# Patient Record
Sex: FEMALE | Race: WHITE | Employment: OTHER | URBAN - METROPOLITAN AREA
[De-identification: names, ages, dates, MRNs, and addresses within clinical notes are randomized per-mention and may not be internally consistent; named-entity substitution may affect disease eponyms.]

---

## 2017-01-12 ENCOUNTER — OFFICE VISIT (OUTPATIENT)
Dept: CARDIOLOGY CLINIC | Age: 77
End: 2017-01-12

## 2017-01-12 VITALS
HEIGHT: 67 IN | WEIGHT: 178 LBS | SYSTOLIC BLOOD PRESSURE: 130 MMHG | BODY MASS INDEX: 27.94 KG/M2 | HEART RATE: 57 BPM | DIASTOLIC BLOOD PRESSURE: 62 MMHG

## 2017-01-12 DIAGNOSIS — I25.10 CORONARY ARTERY DISEASE INVOLVING NATIVE CORONARY ARTERY OF NATIVE HEART WITHOUT ANGINA PECTORIS: ICD-10-CM

## 2017-01-12 DIAGNOSIS — I10 ESSENTIAL HYPERTENSION: Primary | ICD-10-CM

## 2017-01-12 DIAGNOSIS — Z72.0 TOBACCO ABUSE: ICD-10-CM

## 2017-01-12 DIAGNOSIS — E78.49 OTHER HYPERLIPIDEMIA: ICD-10-CM

## 2017-01-12 PROCEDURE — 4004F PT TOBACCO SCREEN RCVD TLK: CPT | Performed by: INTERNAL MEDICINE

## 2017-01-12 PROCEDURE — G8400 PT W/DXA NO RESULTS DOC: HCPCS | Performed by: INTERNAL MEDICINE

## 2017-01-12 PROCEDURE — 4040F PNEUMOC VAC/ADMIN/RCVD: CPT | Performed by: INTERNAL MEDICINE

## 2017-01-12 PROCEDURE — 93000 ELECTROCARDIOGRAM COMPLETE: CPT | Performed by: INTERNAL MEDICINE

## 2017-01-12 PROCEDURE — G8427 DOCREV CUR MEDS BY ELIG CLIN: HCPCS | Performed by: INTERNAL MEDICINE

## 2017-01-12 PROCEDURE — G8420 CALC BMI NORM PARAMETERS: HCPCS | Performed by: INTERNAL MEDICINE

## 2017-01-12 PROCEDURE — 99214 OFFICE O/P EST MOD 30 MIN: CPT | Performed by: INTERNAL MEDICINE

## 2017-01-12 PROCEDURE — G8484 FLU IMMUNIZE NO ADMIN: HCPCS | Performed by: INTERNAL MEDICINE

## 2017-01-12 PROCEDURE — 1090F PRES/ABSN URINE INCON ASSESS: CPT | Performed by: INTERNAL MEDICINE

## 2017-01-12 PROCEDURE — 1123F ACP DISCUSS/DSCN MKR DOCD: CPT | Performed by: INTERNAL MEDICINE

## 2017-01-12 PROCEDURE — G8598 ASA/ANTIPLAT THER USED: HCPCS | Performed by: INTERNAL MEDICINE

## 2017-01-12 RX ORDER — FENOFIBRATE 48 MG/1
48 TABLET, COATED ORAL DAILY
COMMUNITY
End: 2017-07-27

## 2017-06-29 RX ORDER — VALSARTAN 320 MG/1
TABLET ORAL
Qty: 90 TABLET | Refills: 1 | Status: SHIPPED | OUTPATIENT
Start: 2017-06-29 | End: 2018-07-06 | Stop reason: SDUPTHER

## 2017-07-27 ENCOUNTER — OFFICE VISIT (OUTPATIENT)
Dept: CARDIOLOGY CLINIC | Age: 77
End: 2017-07-27

## 2017-07-27 VITALS
HEART RATE: 62 BPM | SYSTOLIC BLOOD PRESSURE: 112 MMHG | HEIGHT: 67 IN | DIASTOLIC BLOOD PRESSURE: 54 MMHG | WEIGHT: 177 LBS | BODY MASS INDEX: 27.78 KG/M2

## 2017-07-27 DIAGNOSIS — I25.10 CORONARY ARTERY DISEASE INVOLVING NATIVE CORONARY ARTERY OF NATIVE HEART WITHOUT ANGINA PECTORIS: Primary | ICD-10-CM

## 2017-07-27 DIAGNOSIS — I10 ESSENTIAL HYPERTENSION: ICD-10-CM

## 2017-07-27 DIAGNOSIS — E78.49 OTHER HYPERLIPIDEMIA: ICD-10-CM

## 2017-07-27 PROCEDURE — 4040F PNEUMOC VAC/ADMIN/RCVD: CPT | Performed by: INTERNAL MEDICINE

## 2017-07-27 PROCEDURE — G8400 PT W/DXA NO RESULTS DOC: HCPCS | Performed by: INTERNAL MEDICINE

## 2017-07-27 PROCEDURE — 1090F PRES/ABSN URINE INCON ASSESS: CPT | Performed by: INTERNAL MEDICINE

## 2017-07-27 PROCEDURE — G8419 CALC BMI OUT NRM PARAM NOF/U: HCPCS | Performed by: INTERNAL MEDICINE

## 2017-07-27 PROCEDURE — 99214 OFFICE O/P EST MOD 30 MIN: CPT | Performed by: INTERNAL MEDICINE

## 2017-07-27 PROCEDURE — 4004F PT TOBACCO SCREEN RCVD TLK: CPT | Performed by: INTERNAL MEDICINE

## 2017-07-27 PROCEDURE — G8598 ASA/ANTIPLAT THER USED: HCPCS | Performed by: INTERNAL MEDICINE

## 2017-07-27 PROCEDURE — G8427 DOCREV CUR MEDS BY ELIG CLIN: HCPCS | Performed by: INTERNAL MEDICINE

## 2017-07-27 PROCEDURE — 1123F ACP DISCUSS/DSCN MKR DOCD: CPT | Performed by: INTERNAL MEDICINE

## 2017-07-27 RX ORDER — MELOXICAM 15 MG/1
15 TABLET ORAL DAILY
COMMUNITY
End: 2018-02-09

## 2017-07-28 ENCOUNTER — OFFICE VISIT (OUTPATIENT)
Dept: ENT CLINIC | Age: 77
End: 2017-07-28

## 2017-07-28 VITALS
DIASTOLIC BLOOD PRESSURE: 79 MMHG | HEART RATE: 60 BPM | SYSTOLIC BLOOD PRESSURE: 139 MMHG | BODY MASS INDEX: 27.78 KG/M2 | WEIGHT: 177 LBS | HEIGHT: 67 IN

## 2017-07-28 DIAGNOSIS — H92.11 OTORRHEA OF RIGHT EAR: Primary | ICD-10-CM

## 2017-07-28 PROCEDURE — G8419 CALC BMI OUT NRM PARAM NOF/U: HCPCS | Performed by: OTOLARYNGOLOGY

## 2017-07-28 PROCEDURE — 1123F ACP DISCUSS/DSCN MKR DOCD: CPT | Performed by: OTOLARYNGOLOGY

## 2017-07-28 PROCEDURE — 1090F PRES/ABSN URINE INCON ASSESS: CPT | Performed by: OTOLARYNGOLOGY

## 2017-07-28 PROCEDURE — 99203 OFFICE O/P NEW LOW 30 MIN: CPT | Performed by: OTOLARYNGOLOGY

## 2017-07-28 PROCEDURE — G8427 DOCREV CUR MEDS BY ELIG CLIN: HCPCS | Performed by: OTOLARYNGOLOGY

## 2017-07-28 PROCEDURE — 4040F PNEUMOC VAC/ADMIN/RCVD: CPT | Performed by: OTOLARYNGOLOGY

## 2017-07-28 PROCEDURE — 4004F PT TOBACCO SCREEN RCVD TLK: CPT | Performed by: OTOLARYNGOLOGY

## 2017-07-28 PROCEDURE — G8598 ASA/ANTIPLAT THER USED: HCPCS | Performed by: OTOLARYNGOLOGY

## 2017-07-28 PROCEDURE — G8400 PT W/DXA NO RESULTS DOC: HCPCS | Performed by: OTOLARYNGOLOGY

## 2017-07-28 RX ORDER — DOXYCYCLINE 100 MG/1
100 CAPSULE ORAL 2 TIMES DAILY
Qty: 20 CAPSULE | Refills: 0 | Status: SHIPPED | OUTPATIENT
Start: 2017-07-28 | End: 2018-01-10 | Stop reason: ALTCHOICE

## 2017-07-28 RX ORDER — HYDROCORTISONE AND ACETIC ACID 20.75; 10.375 MG/ML; MG/ML
4 SOLUTION AURICULAR (OTIC) 2 TIMES DAILY
Qty: 15 ML | Refills: 1 | Status: SHIPPED | OUTPATIENT
Start: 2017-07-28 | End: 2018-01-10 | Stop reason: ALTCHOICE

## 2017-07-28 ASSESSMENT — ENCOUNTER SYMPTOMS
ALLERGIC/IMMUNOLOGIC NEGATIVE: 1
RESPIRATORY NEGATIVE: 1
VOICE CHANGE: 0
TROUBLE SWALLOWING: 0
RHINORRHEA: 1
SORE THROAT: 0
EYES NEGATIVE: 1
SINUS PRESSURE: 1

## 2017-07-31 LAB
CULTURE EAR OR EYE: ABNORMAL
CULTURE EAR OR EYE: ABNORMAL
GRAM STAIN RESULT: ABNORMAL
ORGANISM: ABNORMAL

## 2017-08-07 ENCOUNTER — OFFICE VISIT (OUTPATIENT)
Dept: ENT CLINIC | Age: 77
End: 2017-08-07

## 2017-08-07 VITALS — HEIGHT: 67 IN | WEIGHT: 178 LBS | BODY MASS INDEX: 27.94 KG/M2

## 2017-08-07 DIAGNOSIS — H60.8X1 CHRONIC ECZEMATOUS OTITIS EXTERNA OF RIGHT EAR: Primary | ICD-10-CM

## 2017-08-07 PROCEDURE — G8427 DOCREV CUR MEDS BY ELIG CLIN: HCPCS | Performed by: OTOLARYNGOLOGY

## 2017-08-07 PROCEDURE — 1090F PRES/ABSN URINE INCON ASSESS: CPT | Performed by: OTOLARYNGOLOGY

## 2017-08-07 PROCEDURE — 99213 OFFICE O/P EST LOW 20 MIN: CPT | Performed by: OTOLARYNGOLOGY

## 2017-08-07 PROCEDURE — 1123F ACP DISCUSS/DSCN MKR DOCD: CPT | Performed by: OTOLARYNGOLOGY

## 2017-08-07 PROCEDURE — G8400 PT W/DXA NO RESULTS DOC: HCPCS | Performed by: OTOLARYNGOLOGY

## 2017-08-07 PROCEDURE — G8419 CALC BMI OUT NRM PARAM NOF/U: HCPCS | Performed by: OTOLARYNGOLOGY

## 2017-08-07 PROCEDURE — G8598 ASA/ANTIPLAT THER USED: HCPCS | Performed by: OTOLARYNGOLOGY

## 2017-08-07 PROCEDURE — 4004F PT TOBACCO SCREEN RCVD TLK: CPT | Performed by: OTOLARYNGOLOGY

## 2017-08-07 PROCEDURE — 4040F PNEUMOC VAC/ADMIN/RCVD: CPT | Performed by: OTOLARYNGOLOGY

## 2017-08-14 ENCOUNTER — TELEPHONE (OUTPATIENT)
Dept: ENT CLINIC | Age: 77
End: 2017-08-14

## 2017-11-13 ENCOUNTER — TELEPHONE (OUTPATIENT)
Dept: CARDIOLOGY CLINIC | Age: 77
End: 2017-11-13

## 2017-11-13 NOTE — TELEPHONE ENCOUNTER
Last OV 7/27/17:       Assessment   1. CAD (coronary artery disease): stable  GXT 12/14: normal perfusion  Angiogram 8/9/10> patent stents to LAD and Diag-1, normal EF  GXT Myoview 8/17/10> normal, homogenous uptake of radionuclide activity demonstrated in the left ventricular myocardium during both phased of the exam. Slight decreased activity in the apex on stress images. Angiogram 2/5/09> PTCA and stent of complex LAD and diagonal branch of LAD from 90% to less than 10%   2. HYPERTENSION: stable   3. Hyperlipidemia: stable, 2/22/17 ; TRIG 150; HDL 42; LDL 80   5. Tobacco abuse: patient states she quit May, 2012  6. Carotid stenosis  -stable  Doppler 5/31/16: 16-49% bilateral  Doppler 12/14: 16-49% bilateral           Plan:  Stable cardiac status. No med changes. Encouraged to get regular exercise. Return in 6 months.

## 2017-11-13 NOTE — TELEPHONE ENCOUNTER
CARDIAC CLEARANCE     What type of procedure are you having? colonoscopy    Which physician is performing your procedure? Dr. Lisa Cid    When is your procedure scheduled for? 1/2/17    Where are you having this procedure? [facility]    Are you taking Blood Thinners? yes   If so what? (name/dose/frequesncy)  aspirin 81 MG EC tablet and   valsartan (DIOVAN) 320 MG tablet and  clopidogrel (PLAVIX) 75 MG tablet     Does the surgeon want you to stop your blood thinner? If so for how long? What Dr decides    Phone Number and Contact Name for Physicians office:  Dr. Lisa Cid [phone]  Fax number to send information:     Please call to adv.   Thank you CSF

## 2018-01-10 VITALS — HEIGHT: 67 IN | BODY MASS INDEX: 27.31 KG/M2 | WEIGHT: 174 LBS

## 2018-01-10 RX ORDER — CETIRIZINE HYDROCHLORIDE 10 MG/1
10 TABLET ORAL DAILY
COMMUNITY
End: 2019-04-30

## 2018-01-25 ENCOUNTER — HOSPITAL ENCOUNTER (OUTPATIENT)
Dept: ENDOSCOPY | Age: 78
Discharge: HOME | End: 2018-01-25
Attending: INTERNAL MEDICINE | Admitting: INTERNAL MEDICINE

## 2018-01-25 LAB
GLUCOSE BLD-MCNC: 144 MG/DL (ref 70–99)
GLUCOSE BLD-MCNC: 153 MG/DL (ref 70–99)
PERFORMED ON: ABNORMAL
PERFORMED ON: ABNORMAL

## 2018-01-25 RX ORDER — SODIUM CHLORIDE 0.9 % (FLUSH) 0.9 %
10 SYRINGE (ML) INJECTION PRN
Status: DISCONTINUED | OUTPATIENT
Start: 2018-01-25 | End: 2018-01-26 | Stop reason: HOSPADM

## 2018-01-25 RX ORDER — SODIUM CHLORIDE 9 MG/ML
INJECTION, SOLUTION INTRAVENOUS CONTINUOUS
Status: DISCONTINUED | OUTPATIENT
Start: 2018-01-25 | End: 2018-01-26 | Stop reason: HOSPADM

## 2018-01-25 RX ORDER — SODIUM CHLORIDE 0.9 % (FLUSH) 0.9 %
10 SYRINGE (ML) INJECTION EVERY 12 HOURS SCHEDULED
Status: DISCONTINUED | OUTPATIENT
Start: 2018-01-25 | End: 2018-01-26 | Stop reason: HOSPADM

## 2018-01-25 NOTE — ANESTHESIA PRE-OP
Department of Anesthesiology  Preprocedure Note       Name:  Luisa Ramos   Age:  68 y.o.  :  1940                                          MRN:  [id number]         Date:  2018      Surgeon:    Procedure:    Medications prior to admission:   Prior to Admission medications    Medication Sig Start Date End Date Taking? Authorizing Provider   Sodium Chloride-Xylitol (XLEAR SINUS CARE SPRAY) SOLN by Nasal route   Yes Historical Provider, MD   cetirizine (ZYRTEC) 10 MG tablet Take 10 mg by mouth daily   Yes Historical Provider, MD   meloxicam (MOBIC) 15 MG tablet Take 15 mg by mouth daily    Historical Provider, MD   valsartan (DIOVAN) 320 MG tablet TAKE ONE TABLET BY MOUTH ONCE DAILY 17   Theoplis Reap, MD   traMADol (ULTRAM) 50 MG tablet Take 50 mg by mouth every 6 hours as needed for Pain    Historical Provider, MD   gabapentin (NEURONTIN) 300 MG capsule Take 1 capsule by mouth 2 times daily. 14   Theoplis Reap, MD   nebivolol (BYSTOLIC) 5 MG tablet Take by mouth daily 2.5 mg    Historical Provider, MD   dexlansoprazole (DEXILANT) 60 MG CPDR capsule Take 30 mg by mouth daily. Historical Provider, MD   alprazolam Damaris Hoops) 0.5 MG tablet Take 0.5 mg by mouth 3 times daily as needed. 8/19/10   Historical Provider, MD   metformin (GLUCOPHAGE) 1000 MG tablet Take 1,000 mg by mouth 2 times daily (with meals). Historical Provider, MD   venlafaxine (EFFEXOR XR) 150 MG XR capsule Take 150 mg by mouth daily. Historical Provider, MD   aspirin 81 MG EC tablet Take 81 mg by mouth daily. Historical Provider, MD   atorvastatin (LIPITOR) 80 MG tablet Take 80 mg by mouth nightly. 8/19/10   Historical Provider, MD   clopidogrel (PLAVIX) 75 MG tablet Take 75 mg by mouth daily. Historical Provider, MD   glipiZIDE (GLUCOTROL XL) 2.5 MG CR tablet Take 2.5 mg by mouth daily.     Historical Provider, MD       Current medications:    Current Outpatient Prescriptions   Medication Sig Dispense Refill  Sodium Chloride-Xylitol (XLEAR SINUS CARE SPRAY) SOLN by Nasal route      cetirizine (ZYRTEC) 10 MG tablet Take 10 mg by mouth daily      meloxicam (MOBIC) 15 MG tablet Take 15 mg by mouth daily      valsartan (DIOVAN) 320 MG tablet TAKE ONE TABLET BY MOUTH ONCE DAILY 90 tablet 1    traMADol (ULTRAM) 50 MG tablet Take 50 mg by mouth every 6 hours as needed for Pain      gabapentin (NEURONTIN) 300 MG capsule Take 1 capsule by mouth 2 times daily. 90 capsule 3    nebivolol (BYSTOLIC) 5 MG tablet Take by mouth daily 2.5 mg      dexlansoprazole (DEXILANT) 60 MG CPDR capsule Take 30 mg by mouth daily.  alprazolam (XANAX) 0.5 MG tablet Take 0.5 mg by mouth 3 times daily as needed.  metformin (GLUCOPHAGE) 1000 MG tablet Take 1,000 mg by mouth 2 times daily (with meals).  venlafaxine (EFFEXOR XR) 150 MG XR capsule Take 150 mg by mouth daily.  aspirin 81 MG EC tablet Take 81 mg by mouth daily.  atorvastatin (LIPITOR) 80 MG tablet Take 80 mg by mouth nightly.  clopidogrel (PLAVIX) 75 MG tablet Take 75 mg by mouth daily.  glipiZIDE (GLUCOTROL XL) 2.5 MG CR tablet Take 2.5 mg by mouth daily. Current Facility-Administered Medications   Medication Dose Route Frequency Provider Last Rate Last Dose    0.9 % sodium chloride infusion   Intravenous Continuous Rina Olmos MD        sodium chloride flush 0.9 % injection 10 mL  10 mL Intravenous 2 times per day Rina Olmos MD        sodium chloride flush 0.9 % injection 10 mL  10 mL Intravenous PRN Rina Olmos MD           Allergies:     Allergies   Allergen Reactions    Levofloxacin Hives    Actos [Pioglitazone Hydrochloride] Nausea Only    Bactrim Nausea Only    Cephalexin Itching    Lisinopril Nausea Only    Trazodone And Nefazodone Nausea Only    Vytorin [Ezetimibe-Simvastatin] Nausea Only       Problem List:    Patient Active Problem List   Diagnosis Code    CAD (coronary artery disease) I25.10   

## 2018-02-09 ENCOUNTER — OFFICE VISIT (OUTPATIENT)
Dept: CARDIOLOGY CLINIC | Age: 78
End: 2018-02-09

## 2018-02-09 VITALS
WEIGHT: 175 LBS | HEIGHT: 67 IN | BODY MASS INDEX: 27.47 KG/M2 | SYSTOLIC BLOOD PRESSURE: 120 MMHG | DIASTOLIC BLOOD PRESSURE: 64 MMHG | HEART RATE: 64 BPM

## 2018-02-09 DIAGNOSIS — E78.49 OTHER HYPERLIPIDEMIA: ICD-10-CM

## 2018-02-09 DIAGNOSIS — I73.9 PVD (PERIPHERAL VASCULAR DISEASE) (HCC): ICD-10-CM

## 2018-02-09 DIAGNOSIS — I25.10 CORONARY ARTERY DISEASE INVOLVING NATIVE CORONARY ARTERY OF NATIVE HEART WITHOUT ANGINA PECTORIS: Primary | ICD-10-CM

## 2018-02-09 DIAGNOSIS — I10 ESSENTIAL HYPERTENSION: ICD-10-CM

## 2018-02-09 PROCEDURE — 99214 OFFICE O/P EST MOD 30 MIN: CPT | Performed by: INTERNAL MEDICINE

## 2018-02-09 PROCEDURE — 4040F PNEUMOC VAC/ADMIN/RCVD: CPT | Performed by: INTERNAL MEDICINE

## 2018-02-09 PROCEDURE — 1090F PRES/ABSN URINE INCON ASSESS: CPT | Performed by: INTERNAL MEDICINE

## 2018-02-09 PROCEDURE — G8427 DOCREV CUR MEDS BY ELIG CLIN: HCPCS | Performed by: INTERNAL MEDICINE

## 2018-02-09 PROCEDURE — G8484 FLU IMMUNIZE NO ADMIN: HCPCS | Performed by: INTERNAL MEDICINE

## 2018-02-09 PROCEDURE — 4004F PT TOBACCO SCREEN RCVD TLK: CPT | Performed by: INTERNAL MEDICINE

## 2018-02-09 PROCEDURE — G8598 ASA/ANTIPLAT THER USED: HCPCS | Performed by: INTERNAL MEDICINE

## 2018-02-09 PROCEDURE — 1123F ACP DISCUSS/DSCN MKR DOCD: CPT | Performed by: INTERNAL MEDICINE

## 2018-02-09 PROCEDURE — G8419 CALC BMI OUT NRM PARAM NOF/U: HCPCS | Performed by: INTERNAL MEDICINE

## 2018-02-09 PROCEDURE — G8400 PT W/DXA NO RESULTS DOC: HCPCS | Performed by: INTERNAL MEDICINE

## 2018-02-09 RX ORDER — PANTOPRAZOLE SODIUM 40 MG/1
40 TABLET, DELAYED RELEASE ORAL DAILY
COMMUNITY
End: 2019-04-30

## 2018-02-09 NOTE — PROGRESS NOTES
Dispense Refill    cetirizine (ZYRTEC) 10 MG tablet Take 10 mg by mouth daily      valsartan (DIOVAN) 320 MG tablet TAKE ONE TABLET BY MOUTH ONCE DAILY 90 tablet 1    nebivolol (BYSTOLIC) 5 MG tablet Take 5 mg by mouth daily 2.5 mg      alprazolam (XANAX) 0.5 MG tablet Take 0.5 mg by mouth 3 times daily as needed.  metformin (GLUCOPHAGE) 1000 MG tablet Take 500 mg by mouth 2 times daily (with meals)       venlafaxine (EFFEXOR XR) 150 MG XR capsule Take 150 mg by mouth daily.  aspirin 81 MG EC tablet Take 81 mg by mouth daily.  atorvastatin (LIPITOR) 80 MG tablet Take 80 mg by mouth nightly.  clopidogrel (PLAVIX) 75 MG tablet Take 75 mg by mouth daily.  glipiZIDE (GLUCOTROL XL) 2.5 MG CR tablet Take 2.5 mg by mouth daily. No current facility-administered medications on file prior to visit. Review of Systems:   · Constitutional: there has been no unanticipated weight loss. · Eyes: No visual changes or diplopia. No scleral icterus. · ENT: No Headaches, hearing loss or vertigo. No mouth sores or sore throat. · Cardiovascular: Reviewed in HPI  · Respiratory: No cough or wheezing, no sputum production. No hematemesis. · Gastrointestinal: No abdominal pain, appetite loss, blood in stools. No change in bowel or bladder habits. · Genitourinary: No dysuria, trouble voiding, or hematuria. · Musculoskeletal:  No gait disturbance, weakness or joint complaints. · Integumentary: No rash or pruritis. · Neurological: No headache, diplopia, change in muscle strength, numbness or tingling. No change in gait, balance, coordination, mood, affect, memory, mentation, behavior. · Psychiatric: No anxiety, no depression. · Endocrine: No malaise, fatigue or temperature intolerance. No excessive thirst, fluid intake, or urination. No tremor. · Hematologic/Lymphatic: No abnormal bruising or bleeding, blood clots or swollen lymph nodes.   · Allergic/Immunologic: No nasal congestion or hives.    Physical Examination:    Vitals:    02/09/18 1517   BP: 120/64   Site: Left Arm   Position: Sitting   Cuff Size: Medium Adult   Pulse: 64   Weight: 175 lb (79.4 kg)   Height: 5' 7\" (1.702 m)     Body mass index is 27.41 kg/m². Wt Readings from Last 3 Encounters:   02/09/18 175 lb (79.4 kg)   01/10/18 174 lb (78.9 kg)   08/07/17 178 lb (80.7 kg)     BP Readings from Last 3 Encounters:   02/09/18 120/64   07/28/17 139/79   07/27/17 (!) 112/54       Constitutional and General Appearance: NAD, appears stated age  Respiratory:  · Normal excursion and expansion without use of accessory muscles  · Resp Auscultation: Normal breath sounds without dullness  Cardiovascular:  · The apical impulses not displaced  · Heart tones are crisp and normal  · Cervical veins are not engorged  · The carotid upstroke is normal in amplitude and contour without delay or bruit  · JVP is not elevated  · Peripheral pulses are symmetrical and full  · There is no clubbing, cyanosis of the extremities. · No edema  · Femoral Arteries: 2+ and equal  · Pedal Pulses: 2+ and equal   Abdomen:  · No masses or tenderness  · Liver/Spleen: No Abnormalities Noted  Neurological/Psychiatric:  · Alert and oriented in all spheres  · Moves all extremities well  · Exhibits normal gait balance and coordination  · No abnormalities of mood, affect, memory, mentation, or behavior are noted    Assessment:  1. CAD (coronary artery disease): Stable, no anginal symptoms. EKG 1/12/18> SB 57  GXT gretchen 12/14> normal perfusion  Angiogram 8/9/10> patent stents to LAD and Diag-1, normal EF  GXT Myoview 8/17/10> normal, homogenous uptake of radionuclide activity demonstrated in the left ventricular myocardium during both phased of the exam. Slight decreased activity in the apex on stress images. Angiogram 2/5/09> PTCA and stent of complex LAD and diagonal branch of LAD from 90% to less than 10%   2. Hypertension: Stable.  Blood pressure 120/64, pulse 64, height 5'

## 2018-05-08 ENCOUNTER — OFFICE VISIT (OUTPATIENT)
Dept: ENT CLINIC | Age: 78
End: 2018-05-08

## 2018-05-08 VITALS — SYSTOLIC BLOOD PRESSURE: 130 MMHG | DIASTOLIC BLOOD PRESSURE: 80 MMHG

## 2018-05-08 DIAGNOSIS — H92.11 OTORRHEA OF RIGHT EAR: Primary | ICD-10-CM

## 2018-05-08 DIAGNOSIS — H60.8X1 CHRONIC ECZEMATOUS OTITIS EXTERNA OF RIGHT EAR: ICD-10-CM

## 2018-05-08 PROCEDURE — G8427 DOCREV CUR MEDS BY ELIG CLIN: HCPCS | Performed by: OTOLARYNGOLOGY

## 2018-05-08 PROCEDURE — G8400 PT W/DXA NO RESULTS DOC: HCPCS | Performed by: OTOLARYNGOLOGY

## 2018-05-08 PROCEDURE — 4004F PT TOBACCO SCREEN RCVD TLK: CPT | Performed by: OTOLARYNGOLOGY

## 2018-05-08 PROCEDURE — G8598 ASA/ANTIPLAT THER USED: HCPCS | Performed by: OTOLARYNGOLOGY

## 2018-05-08 PROCEDURE — 1090F PRES/ABSN URINE INCON ASSESS: CPT | Performed by: OTOLARYNGOLOGY

## 2018-05-08 PROCEDURE — 99213 OFFICE O/P EST LOW 20 MIN: CPT | Performed by: OTOLARYNGOLOGY

## 2018-05-08 PROCEDURE — G8417 CALC BMI ABV UP PARAM F/U: HCPCS | Performed by: OTOLARYNGOLOGY

## 2018-05-08 PROCEDURE — 4040F PNEUMOC VAC/ADMIN/RCVD: CPT | Performed by: OTOLARYNGOLOGY

## 2018-05-08 PROCEDURE — 1123F ACP DISCUSS/DSCN MKR DOCD: CPT | Performed by: OTOLARYNGOLOGY

## 2018-05-08 RX ORDER — FERROUS SULFATE 325(65) MG
325 TABLET ORAL EVERY OTHER DAY
COMMUNITY
End: 2019-02-20 | Stop reason: ALTCHOICE

## 2018-05-08 RX ORDER — METHYLPREDNISOLONE 4 MG/1
4 TABLET ORAL SEE ADMIN INSTRUCTIONS
Qty: 1 KIT | Refills: 0 | Status: SHIPPED | OUTPATIENT
Start: 2018-05-08 | End: 2018-08-16

## 2018-05-14 ENCOUNTER — TELEPHONE (OUTPATIENT)
Dept: ENT CLINIC | Age: 78
End: 2018-05-14

## 2018-05-14 DIAGNOSIS — H60.8X1 CHRONIC ECZEMATOUS OTITIS EXTERNA OF RIGHT EAR: Primary | ICD-10-CM

## 2018-05-14 RX ORDER — HYDROCORTISONE AND ACETIC ACID 20.75; 10.375 MG/ML; MG/ML
3 SOLUTION AURICULAR (OTIC) 2 TIMES DAILY
Qty: 15 ML | Refills: 1 | Status: SHIPPED | OUTPATIENT
Start: 2018-05-14 | End: 2019-04-30

## 2018-05-18 ENCOUNTER — TELEPHONE (OUTPATIENT)
Dept: ENT CLINIC | Age: 78
End: 2018-05-18

## 2018-05-18 DIAGNOSIS — H92.11 OTORRHEA OF RIGHT EAR: Primary | ICD-10-CM

## 2018-05-21 RX ORDER — NEOMYCIN SULFATE, POLYMYXIN B SULFATE AND HYDROCORTISONE 10; 3.5; 1 MG/ML; MG/ML; [USP'U]/ML
3 SUSPENSION/ DROPS AURICULAR (OTIC) 3 TIMES DAILY
Qty: 10 ML | Refills: 1 | Status: SHIPPED | OUTPATIENT
Start: 2018-05-21 | End: 2018-05-31

## 2018-07-09 RX ORDER — VALSARTAN 320 MG/1
TABLET ORAL
Qty: 90 TABLET | Refills: 3 | Status: SHIPPED | OUTPATIENT
Start: 2018-07-09 | End: 2018-08-07 | Stop reason: ALTCHOICE

## 2018-08-07 ENCOUNTER — TELEPHONE (OUTPATIENT)
Dept: CARDIOLOGY CLINIC | Age: 78
End: 2018-08-07

## 2018-08-07 RX ORDER — IRBESARTAN 300 MG/1
300 TABLET ORAL NIGHTLY
Qty: 30 TABLET | Refills: 11 | Status: SHIPPED | OUTPATIENT
Start: 2018-08-07 | End: 2019-02-25

## 2018-08-07 NOTE — TELEPHONE ENCOUNTER
Pt calling got a letter about the recall on the Valsartan and needs to know what she can be changed to?  Pls call to advise Thank you

## 2018-08-16 ENCOUNTER — OFFICE VISIT (OUTPATIENT)
Dept: CARDIOLOGY CLINIC | Age: 78
End: 2018-08-16

## 2018-08-16 VITALS
HEIGHT: 67 IN | HEART RATE: 62 BPM | DIASTOLIC BLOOD PRESSURE: 60 MMHG | WEIGHT: 173 LBS | BODY MASS INDEX: 27.15 KG/M2 | SYSTOLIC BLOOD PRESSURE: 122 MMHG

## 2018-08-16 DIAGNOSIS — I10 ESSENTIAL HYPERTENSION: ICD-10-CM

## 2018-08-16 DIAGNOSIS — E78.49 OTHER HYPERLIPIDEMIA: ICD-10-CM

## 2018-08-16 DIAGNOSIS — E11.9 TYPE 2 DIABETES MELLITUS WITHOUT COMPLICATION, WITHOUT LONG-TERM CURRENT USE OF INSULIN (HCC): ICD-10-CM

## 2018-08-16 DIAGNOSIS — I25.10 CORONARY ARTERY DISEASE INVOLVING NATIVE CORONARY ARTERY OF NATIVE HEART WITHOUT ANGINA PECTORIS: Primary | ICD-10-CM

## 2018-08-16 PROCEDURE — 1123F ACP DISCUSS/DSCN MKR DOCD: CPT | Performed by: INTERNAL MEDICINE

## 2018-08-16 PROCEDURE — 4004F PT TOBACCO SCREEN RCVD TLK: CPT | Performed by: INTERNAL MEDICINE

## 2018-08-16 PROCEDURE — G8598 ASA/ANTIPLAT THER USED: HCPCS | Performed by: INTERNAL MEDICINE

## 2018-08-16 PROCEDURE — 99214 OFFICE O/P EST MOD 30 MIN: CPT | Performed by: INTERNAL MEDICINE

## 2018-08-16 PROCEDURE — G8400 PT W/DXA NO RESULTS DOC: HCPCS | Performed by: INTERNAL MEDICINE

## 2018-08-16 PROCEDURE — 4040F PNEUMOC VAC/ADMIN/RCVD: CPT | Performed by: INTERNAL MEDICINE

## 2018-08-16 PROCEDURE — 1090F PRES/ABSN URINE INCON ASSESS: CPT | Performed by: INTERNAL MEDICINE

## 2018-08-16 PROCEDURE — G8427 DOCREV CUR MEDS BY ELIG CLIN: HCPCS | Performed by: INTERNAL MEDICINE

## 2018-08-16 PROCEDURE — 1101F PT FALLS ASSESS-DOCD LE1/YR: CPT | Performed by: INTERNAL MEDICINE

## 2018-08-16 PROCEDURE — G8417 CALC BMI ABV UP PARAM F/U: HCPCS | Performed by: INTERNAL MEDICINE

## 2018-08-16 RX ORDER — CYCLOBENZAPRINE HCL 5 MG
5 TABLET ORAL 3 TIMES DAILY PRN
COMMUNITY
End: 2019-04-30

## 2018-08-16 RX ORDER — MIRTAZAPINE 30 MG/1
30 TABLET, FILM COATED ORAL NIGHTLY
COMMUNITY
End: 2019-04-30

## 2018-08-16 NOTE — PROGRESS NOTES
excessive thirst, fluid intake, or urination. No tremor. · Hematologic/Lymphatic: No abnormal bruising or bleeding, blood clots or swollen lymph nodes. · Allergic/Immunologic: No nasal congestion or hives. Physical Examination:    Vitals:    08/16/18 1418   BP: 122/60   Site: Left Arm   Position: Sitting   Cuff Size: Medium Adult   Pulse: 62   Weight: 173 lb (78.5 kg)   Height: 5' 7\" (1.702 m)     Body mass index is 27.1 kg/m². Wt Readings from Last 3 Encounters:   08/16/18 173 lb (78.5 kg)   02/09/18 175 lb (79.4 kg)   01/10/18 174 lb (78.9 kg)     BP Readings from Last 3 Encounters:   08/16/18 122/60   05/08/18 130/80   02/09/18 120/64       Constitutional and General Appearance: NAD, appears stated age  Respiratory:  · Normal excursion and expansion without use of accessory muscles  · Resp Auscultation: Normal breath sounds without dullness  Cardiovascular:  · The apical impulses not displaced  · Heart tones are crisp and normal  · Cervical veins are not engorged  · The carotid upstroke is normal in amplitude and contour without delay or bruit  · JVP is not elevated  · Peripheral pulses are symmetrical and full  · There is no clubbing, cyanosis of the extremities. · No edema  · Femoral Arteries: 2+ and equal  · Pedal Pulses: 2+ and equal   Abdomen:  · No masses or tenderness  · Liver/Spleen: No Abnormalities Noted  Neurological/Psychiatric:  · Alert and oriented in all spheres  · Moves all extremities well  · Exhibits normal gait balance and coordination  · No abnormalities of mood, affect, memory, mentation, or behavior are noted    Assessment:  1. CAD (coronary artery disease): Stable, no anginal symptoms.   GXT gretchen 12/14> normal perfusion  Angiogram 8/9/10> patent stents to LAD and Diag-1, normal EF  GXT Myoview 8/17/10> normal, homogenous uptake of radionuclide activity demonstrated in the left ventricular myocardium during both phased of the exam. Slight decreased activity in the apex on stress images. Angiogram 2/5/09> PTCA and stent of complex LAD and diagonal branch of LAD from 90% to less than 10%   2. Hypertension: Stable   3. Hyperlipidemia: Stable on Lipitor 80mg. 3/23/18> , TRIG 168, HDL 38, LDL 61   4. Carotid stenosis: Stable. Doppler 5/31/16> 16-49% bilateral  Doppler 12/14> 16-49% bilateral  5. Tobacco abuse: patient states she quit May, 2012  6. DM - A1c is >7 and TG are higher. She has not been compliant with her diet and is not sure how to count her carbs. Plan:  Stable cardiac status. No med changes. She is in need of diabetic education. Referral placed for diabetic teaching at [facility]. FU 6 months. Scribe's attestation: This note was scribed in the presence of Dr uJstyna Orozco MD by Jazmyne Willingham RN. The scribe's documentation has been prepared under my direction and personally reviewed by me in its entirety. I confirm that the note above accurately reflects all work, treatment, procedures, and medical decision making performed by me.

## 2018-08-17 NOTE — COMMUNICATION BODY
excessive thirst, fluid intake, or urination. No tremor. · Hematologic/Lymphatic: No abnormal bruising or bleeding, blood clots or swollen lymph nodes. · Allergic/Immunologic: No nasal congestion or hives. Physical Examination:    Vitals:    08/16/18 1418   BP: 122/60   Site: Left Arm   Position: Sitting   Cuff Size: Medium Adult   Pulse: 62   Weight: 173 lb (78.5 kg)   Height: 5' 7\" (1.702 m)     Body mass index is 27.1 kg/m². Wt Readings from Last 3 Encounters:   08/16/18 173 lb (78.5 kg)   02/09/18 175 lb (79.4 kg)   01/10/18 174 lb (78.9 kg)     BP Readings from Last 3 Encounters:   08/16/18 122/60   05/08/18 130/80   02/09/18 120/64       Constitutional and General Appearance: NAD, appears stated age  Respiratory:  · Normal excursion and expansion without use of accessory muscles  · Resp Auscultation: Normal breath sounds without dullness  Cardiovascular:  · The apical impulses not displaced  · Heart tones are crisp and normal  · Cervical veins are not engorged  · The carotid upstroke is normal in amplitude and contour without delay or bruit  · JVP is not elevated  · Peripheral pulses are symmetrical and full  · There is no clubbing, cyanosis of the extremities. · No edema  · Femoral Arteries: 2+ and equal  · Pedal Pulses: 2+ and equal   Abdomen:  · No masses or tenderness  · Liver/Spleen: No Abnormalities Noted  Neurological/Psychiatric:  · Alert and oriented in all spheres  · Moves all extremities well  · Exhibits normal gait balance and coordination  · No abnormalities of mood, affect, memory, mentation, or behavior are noted    Assessment:  1. CAD (coronary artery disease): Stable, no anginal symptoms.   GXT gretchen 12/14> normal perfusion  Angiogram 8/9/10> patent stents to LAD and Diag-1, normal EF  GXT Myoview 8/17/10> normal, homogenous uptake of radionuclide activity demonstrated in the left ventricular myocardium during both phased of the exam. Slight decreased activity in the apex on stress images. Angiogram 2/5/09> PTCA and stent of complex LAD and diagonal branch of LAD from 90% to less than 10%   2. Hypertension: Stable   3. Hyperlipidemia: Stable on Lipitor 80mg. 3/23/18> , TRIG 168, HDL 38, LDL 61   4. Carotid stenosis: Stable. Doppler 5/31/16> 16-49% bilateral  Doppler 12/14> 16-49% bilateral  5. Tobacco abuse: patient states she quit May, 2012  6. DM - A1c is >7 and TG are higher. She has not been compliant with her diet and is not sure how to count her carbs. Plan:  Stable cardiac status. No med changes. She is in need of diabetic education. Referral placed for diabetic teaching at [facility]. FU 6 months. Scribe's attestation: This note was scribed in the presence of Dr Chantell Link MD by Santos Lopez RN. The scribe's documentation has been prepared under my direction and personally reviewed by me in its entirety. I confirm that the note above accurately reflects all work, treatment, procedures, and medical decision making performed by me.

## 2018-08-27 ENCOUNTER — HOSPITAL ENCOUNTER (OUTPATIENT)
Dept: DIABETES SERVICES | Age: 78
Discharge: HOME | End: 2018-08-31

## 2018-08-27 DIAGNOSIS — E11.9 TYPE 2 DIABETES MELLITUS WITHOUT COMPLICATIONS (HCC): ICD-10-CM

## 2018-08-27 ASSESSMENT — SLEEP AND FATIGUE QUESTIONNAIRES
HOW DO YOU RATE THE QUALITY OF YOUR SLEEP: POOR
HAVE YOU BEEN TOLD, OR NOTICED ON YOUR OWN, THAT YOU STOP BREATHING OR STRUGGLE TO BREATHE IN YOUR SLEEP: NO
HAVE YOU EVER BEEN TESTED FOR SLEEP APNEA: NO
HOW MANY HOURS OF SLEEP ARE YOU GETTING, ON AVERAGE: 7 OR MORE

## 2018-08-27 ASSESSMENT — PROBLEM AREAS IN DIABETES QUESTIONNAIRE (PAID)
WORRYING ABOUT THE FUTURE AND THE POSSIBILITY OF SERIOUS COMPLICATIONS: 2
FEELING DEPRESSED WHEN YOU THINK ABOUT LIVING WITH DIABETES: 1
COPING WITH COMPLICATIONS OF DIABETES: 2
FEELING THAT DIABETES IS TAKING UP TOO MUCH OF YOUR MENTAL AND PHYSICAL ENERGY EVERY DAY: 2

## 2018-08-27 ASSESSMENT — PATIENT HEALTH QUESTIONNAIRE - PHQ9: SUM OF ALL RESPONSES TO PHQ QUESTIONS 1-9: 13

## 2018-08-27 NOTE — PROGRESS NOTES
Individual Comprehensive DSMT Assessment:  Health Literacy:   [x] adequate   [] limited  Pre-Test Score: 5/8  Sleep Screen: Negative for symptoms of Obstructive Sleep Apnea    PHQ9: PHQ-9 Total Score: 13 (8/27/2018  3:09 PM)        Initial instruction included:  [x] carb counting  [x] activity/exercise  [x] label reading  [x] monitoring   [] medications  [] hypoglycemia prevention/treatment  [] insulin management  [] other:    Education and Support Plan: Patient declined to schedule at this time, but stated intention to schedule in the near future.       Referring Provider: Rudy Keating MD

## 2018-09-01 ENCOUNTER — HOSPITAL ENCOUNTER (OUTPATIENT)
Dept: OTHER | Age: 78
Discharge: HOME | End: 2018-09-01
Attending: INTERNAL MEDICINE | Admitting: INTERNAL MEDICINE

## 2018-12-07 ENCOUNTER — OFFICE VISIT (OUTPATIENT)
Dept: ENT CLINIC | Age: 78
End: 2018-12-07
Payer: MEDICARE

## 2018-12-07 VITALS
DIASTOLIC BLOOD PRESSURE: 82 MMHG | SYSTOLIC BLOOD PRESSURE: 116 MMHG | WEIGHT: 173 LBS | BODY MASS INDEX: 27.1 KG/M2 | HEART RATE: 74 BPM

## 2018-12-07 DIAGNOSIS — H92.11 OTORRHEA OF RIGHT EAR: Primary | ICD-10-CM

## 2018-12-07 PROCEDURE — 1123F ACP DISCUSS/DSCN MKR DOCD: CPT | Performed by: OTOLARYNGOLOGY

## 2018-12-07 PROCEDURE — G8400 PT W/DXA NO RESULTS DOC: HCPCS | Performed by: OTOLARYNGOLOGY

## 2018-12-07 PROCEDURE — 99213 OFFICE O/P EST LOW 20 MIN: CPT | Performed by: OTOLARYNGOLOGY

## 2018-12-07 PROCEDURE — 1101F PT FALLS ASSESS-DOCD LE1/YR: CPT | Performed by: OTOLARYNGOLOGY

## 2018-12-07 PROCEDURE — 4040F PNEUMOC VAC/ADMIN/RCVD: CPT | Performed by: OTOLARYNGOLOGY

## 2018-12-07 PROCEDURE — 1090F PRES/ABSN URINE INCON ASSESS: CPT | Performed by: OTOLARYNGOLOGY

## 2018-12-07 PROCEDURE — G8598 ASA/ANTIPLAT THER USED: HCPCS | Performed by: OTOLARYNGOLOGY

## 2018-12-07 PROCEDURE — 4004F PT TOBACCO SCREEN RCVD TLK: CPT | Performed by: OTOLARYNGOLOGY

## 2018-12-07 PROCEDURE — G8427 DOCREV CUR MEDS BY ELIG CLIN: HCPCS | Performed by: OTOLARYNGOLOGY

## 2018-12-07 PROCEDURE — G8484 FLU IMMUNIZE NO ADMIN: HCPCS | Performed by: OTOLARYNGOLOGY

## 2018-12-07 PROCEDURE — G8417 CALC BMI ABV UP PARAM F/U: HCPCS | Performed by: OTOLARYNGOLOGY

## 2018-12-07 RX ORDER — DOXYCYCLINE 100 MG/1
100 CAPSULE ORAL 2 TIMES DAILY
Qty: 20 CAPSULE | Refills: 0 | Status: SHIPPED | OUTPATIENT
Start: 2018-12-07 | End: 2019-04-30

## 2018-12-07 RX ORDER — METHYLPREDNISOLONE 4 MG/1
4 TABLET ORAL SEE ADMIN INSTRUCTIONS
Qty: 1 KIT | Refills: 0 | Status: SHIPPED | OUTPATIENT
Start: 2018-12-07 | End: 2019-01-02 | Stop reason: SDUPTHER

## 2018-12-07 RX ORDER — GENTAMICIN SULFATE 3 MG/ML
SOLUTION/ DROPS OPHTHALMIC
Qty: 10 ML | Refills: 1 | Status: SHIPPED | OUTPATIENT
Start: 2018-12-07 | End: 2019-02-20

## 2018-12-10 ENCOUNTER — TELEPHONE (OUTPATIENT)
Dept: ENT CLINIC | Age: 78
End: 2018-12-10

## 2018-12-12 ENCOUNTER — TELEPHONE (OUTPATIENT)
Dept: ENT CLINIC | Age: 78
End: 2018-12-12

## 2018-12-12 LAB
GRAM STAIN RESULT: ABNORMAL
ORGANISM: ABNORMAL
ORGANISM: ABNORMAL
WOUND/ABSCESS: ABNORMAL

## 2018-12-12 NOTE — TELEPHONE ENCOUNTER
The patient was called and informed of the previous message she states understanding of this and will continue the antibiotic only.

## 2018-12-12 NOTE — TELEPHONE ENCOUNTER
Informed pt on detailed result message. Pt states that she  was advised to stop taking ATB Doxycyline by  due to medication raising patients blood sugar. Pt states she only took 2 pills and stopped med as advised. Now questions should she start back same ATB or any other advise/recoomendations? Please advise.

## 2018-12-17 ENCOUNTER — TELEPHONE (OUTPATIENT)
Dept: ENT CLINIC | Age: 78
End: 2018-12-17

## 2018-12-17 DIAGNOSIS — H92.11 OTORRHEA OF RIGHT EAR: ICD-10-CM

## 2019-01-02 RX ORDER — METHYLPREDNISOLONE 4 MG/1
4 TABLET ORAL SEE ADMIN INSTRUCTIONS
Qty: 1 KIT | Refills: 0 | Status: SHIPPED | OUTPATIENT
Start: 2019-01-02 | End: 2019-01-28 | Stop reason: ALTCHOICE

## 2019-01-04 ENCOUNTER — OFFICE VISIT (OUTPATIENT)
Dept: ENT CLINIC | Age: 79
End: 2019-01-04
Payer: MEDICARE

## 2019-01-04 VITALS — OXYGEN SATURATION: 96 % | SYSTOLIC BLOOD PRESSURE: 116 MMHG | DIASTOLIC BLOOD PRESSURE: 62 MMHG | HEART RATE: 63 BPM

## 2019-01-04 DIAGNOSIS — H92.11 OTORRHEA OF RIGHT EAR: ICD-10-CM

## 2019-01-04 DIAGNOSIS — H81.01 LABYRINTHINE VERTIGO WITH INVOLVEMENT OF RIGHT INNER EAR: Primary | ICD-10-CM

## 2019-01-04 PROCEDURE — 4004F PT TOBACCO SCREEN RCVD TLK: CPT | Performed by: OTOLARYNGOLOGY

## 2019-01-04 PROCEDURE — 1123F ACP DISCUSS/DSCN MKR DOCD: CPT | Performed by: OTOLARYNGOLOGY

## 2019-01-04 PROCEDURE — G8427 DOCREV CUR MEDS BY ELIG CLIN: HCPCS | Performed by: OTOLARYNGOLOGY

## 2019-01-04 PROCEDURE — 1090F PRES/ABSN URINE INCON ASSESS: CPT | Performed by: OTOLARYNGOLOGY

## 2019-01-04 PROCEDURE — 99213 OFFICE O/P EST LOW 20 MIN: CPT | Performed by: OTOLARYNGOLOGY

## 2019-01-04 PROCEDURE — 1101F PT FALLS ASSESS-DOCD LE1/YR: CPT | Performed by: OTOLARYNGOLOGY

## 2019-01-04 PROCEDURE — 4040F PNEUMOC VAC/ADMIN/RCVD: CPT | Performed by: OTOLARYNGOLOGY

## 2019-01-04 PROCEDURE — G8400 PT W/DXA NO RESULTS DOC: HCPCS | Performed by: OTOLARYNGOLOGY

## 2019-01-04 PROCEDURE — G8598 ASA/ANTIPLAT THER USED: HCPCS | Performed by: OTOLARYNGOLOGY

## 2019-01-04 PROCEDURE — G8484 FLU IMMUNIZE NO ADMIN: HCPCS | Performed by: OTOLARYNGOLOGY

## 2019-01-04 PROCEDURE — G8417 CALC BMI ABV UP PARAM F/U: HCPCS | Performed by: OTOLARYNGOLOGY

## 2019-01-04 RX ORDER — MECLIZINE HYDROCHLORIDE 25 MG/1
25 TABLET ORAL 2 TIMES DAILY
Qty: 30 TABLET | Refills: 1 | Status: SHIPPED | OUTPATIENT
Start: 2019-01-04 | End: 2019-04-30

## 2019-01-04 RX ORDER — ALPRAZOLAM 0.25 MG/1
0.25 TABLET ORAL 2 TIMES DAILY
Qty: 42 TABLET | Refills: 0 | Status: SHIPPED | OUTPATIENT
Start: 2019-01-04 | End: 2019-01-04

## 2019-01-08 ENCOUNTER — HOSPITAL ENCOUNTER (OUTPATIENT)
Dept: CT IMAGING | Age: 79
Discharge: HOME | End: 2019-01-08
Payer: MEDICARE

## 2019-01-08 DIAGNOSIS — H81.01 LABYRINTHINE VERTIGO WITH INVOLVEMENT OF RIGHT INNER EAR: ICD-10-CM

## 2019-01-08 DIAGNOSIS — H92.11 OTORRHEA OF RIGHT EAR: ICD-10-CM

## 2019-01-08 PROCEDURE — 70450 CT HEAD/BRAIN W/O DYE: CPT

## 2019-01-18 ENCOUNTER — TELEPHONE (OUTPATIENT)
Dept: ORTHOPEDIC SURGERY | Age: 79
End: 2019-01-18

## 2019-01-28 ENCOUNTER — OFFICE VISIT (OUTPATIENT)
Dept: ORTHOPEDIC SURGERY | Age: 79
End: 2019-01-28
Payer: MEDICARE

## 2019-01-28 VITALS
WEIGHT: 172 LBS | HEART RATE: 60 BPM | SYSTOLIC BLOOD PRESSURE: 139 MMHG | HEIGHT: 67 IN | DIASTOLIC BLOOD PRESSURE: 55 MMHG | BODY MASS INDEX: 27 KG/M2

## 2019-01-28 DIAGNOSIS — M54.16 LUMBAR RADICULOPATHY: ICD-10-CM

## 2019-01-28 DIAGNOSIS — M54.41 ACUTE BILATERAL LOW BACK PAIN WITH BILATERAL SCIATICA: ICD-10-CM

## 2019-01-28 DIAGNOSIS — M54.42 ACUTE BILATERAL LOW BACK PAIN WITH BILATERAL SCIATICA: ICD-10-CM

## 2019-01-28 DIAGNOSIS — M25.552 PAIN OF LEFT HIP JOINT: Primary | ICD-10-CM

## 2019-01-28 DIAGNOSIS — M16.12 PRIMARY OSTEOARTHRITIS OF LEFT HIP: ICD-10-CM

## 2019-01-28 PROCEDURE — G8598 ASA/ANTIPLAT THER USED: HCPCS | Performed by: ORTHOPAEDIC SURGERY

## 2019-01-28 PROCEDURE — G8484 FLU IMMUNIZE NO ADMIN: HCPCS | Performed by: ORTHOPAEDIC SURGERY

## 2019-01-28 PROCEDURE — 1090F PRES/ABSN URINE INCON ASSESS: CPT | Performed by: ORTHOPAEDIC SURGERY

## 2019-01-28 PROCEDURE — 99203 OFFICE O/P NEW LOW 30 MIN: CPT | Performed by: ORTHOPAEDIC SURGERY

## 2019-01-28 PROCEDURE — 1101F PT FALLS ASSESS-DOCD LE1/YR: CPT | Performed by: ORTHOPAEDIC SURGERY

## 2019-01-28 PROCEDURE — G8427 DOCREV CUR MEDS BY ELIG CLIN: HCPCS | Performed by: ORTHOPAEDIC SURGERY

## 2019-01-28 PROCEDURE — 4004F PT TOBACCO SCREEN RCVD TLK: CPT | Performed by: ORTHOPAEDIC SURGERY

## 2019-01-28 PROCEDURE — 1123F ACP DISCUSS/DSCN MKR DOCD: CPT | Performed by: ORTHOPAEDIC SURGERY

## 2019-01-28 PROCEDURE — G8417 CALC BMI ABV UP PARAM F/U: HCPCS | Performed by: ORTHOPAEDIC SURGERY

## 2019-01-28 PROCEDURE — 4040F PNEUMOC VAC/ADMIN/RCVD: CPT | Performed by: ORTHOPAEDIC SURGERY

## 2019-01-28 PROCEDURE — G8400 PT W/DXA NO RESULTS DOC: HCPCS | Performed by: ORTHOPAEDIC SURGERY

## 2019-02-12 ENCOUNTER — TELEPHONE (OUTPATIENT)
Dept: CARDIOLOGY CLINIC | Age: 79
End: 2019-02-12

## 2019-02-13 ENCOUNTER — OFFICE VISIT (OUTPATIENT)
Dept: ORTHOPEDIC SURGERY | Age: 79
End: 2019-02-13
Payer: MEDICARE

## 2019-02-13 VITALS
BODY MASS INDEX: 27.31 KG/M2 | WEIGHT: 174 LBS | HEIGHT: 67 IN | SYSTOLIC BLOOD PRESSURE: 148 MMHG | HEART RATE: 62 BPM | DIASTOLIC BLOOD PRESSURE: 68 MMHG

## 2019-02-13 DIAGNOSIS — M48.061 SPINAL STENOSIS OF LUMBAR REGION WITHOUT NEUROGENIC CLAUDICATION: ICD-10-CM

## 2019-02-13 DIAGNOSIS — M47.816 SPONDYLOSIS OF LUMBAR REGION WITHOUT MYELOPATHY OR RADICULOPATHY: ICD-10-CM

## 2019-02-13 DIAGNOSIS — M54.16 LUMBAR RADICULOPATHY: ICD-10-CM

## 2019-02-13 DIAGNOSIS — M48.061 LUMBAR FORAMINAL STENOSIS: Primary | ICD-10-CM

## 2019-02-13 PROCEDURE — 1090F PRES/ABSN URINE INCON ASSESS: CPT | Performed by: PHYSICAL MEDICINE & REHABILITATION

## 2019-02-13 PROCEDURE — 1101F PT FALLS ASSESS-DOCD LE1/YR: CPT | Performed by: PHYSICAL MEDICINE & REHABILITATION

## 2019-02-13 PROCEDURE — 99204 OFFICE O/P NEW MOD 45 MIN: CPT | Performed by: PHYSICAL MEDICINE & REHABILITATION

## 2019-02-13 PROCEDURE — G8417 CALC BMI ABV UP PARAM F/U: HCPCS | Performed by: PHYSICAL MEDICINE & REHABILITATION

## 2019-02-13 PROCEDURE — G8484 FLU IMMUNIZE NO ADMIN: HCPCS | Performed by: PHYSICAL MEDICINE & REHABILITATION

## 2019-02-13 PROCEDURE — G8427 DOCREV CUR MEDS BY ELIG CLIN: HCPCS | Performed by: PHYSICAL MEDICINE & REHABILITATION

## 2019-02-14 ENCOUNTER — TELEPHONE (OUTPATIENT)
Dept: ORTHOPEDIC SURGERY | Age: 79
End: 2019-02-14

## 2019-02-19 ENCOUNTER — TELEPHONE (OUTPATIENT)
Dept: CARDIOLOGY CLINIC | Age: 79
End: 2019-02-19

## 2019-02-25 ENCOUNTER — OFFICE VISIT (OUTPATIENT)
Dept: CARDIOLOGY CLINIC | Age: 79
End: 2019-02-25
Payer: MEDICARE

## 2019-02-25 VITALS
HEIGHT: 67 IN | HEART RATE: 61 BPM | DIASTOLIC BLOOD PRESSURE: 60 MMHG | WEIGHT: 173 LBS | BODY MASS INDEX: 27.15 KG/M2 | SYSTOLIC BLOOD PRESSURE: 110 MMHG

## 2019-02-25 DIAGNOSIS — I25.10 CORONARY ARTERY DISEASE INVOLVING NATIVE CORONARY ARTERY OF NATIVE HEART WITHOUT ANGINA PECTORIS: ICD-10-CM

## 2019-02-25 DIAGNOSIS — I10 ESSENTIAL HYPERTENSION: Primary | ICD-10-CM

## 2019-02-25 DIAGNOSIS — E78.49 OTHER HYPERLIPIDEMIA: ICD-10-CM

## 2019-02-25 PROCEDURE — 93000 ELECTROCARDIOGRAM COMPLETE: CPT | Performed by: INTERNAL MEDICINE

## 2019-02-25 PROCEDURE — G8428 CUR MEDS NOT DOCUMENT: HCPCS | Performed by: INTERNAL MEDICINE

## 2019-02-25 PROCEDURE — G8417 CALC BMI ABV UP PARAM F/U: HCPCS | Performed by: INTERNAL MEDICINE

## 2019-02-25 PROCEDURE — G8598 ASA/ANTIPLAT THER USED: HCPCS | Performed by: INTERNAL MEDICINE

## 2019-02-25 PROCEDURE — G8484 FLU IMMUNIZE NO ADMIN: HCPCS | Performed by: INTERNAL MEDICINE

## 2019-02-25 PROCEDURE — 1090F PRES/ABSN URINE INCON ASSESS: CPT | Performed by: INTERNAL MEDICINE

## 2019-02-25 PROCEDURE — 99214 OFFICE O/P EST MOD 30 MIN: CPT | Performed by: INTERNAL MEDICINE

## 2019-02-25 PROCEDURE — 4040F PNEUMOC VAC/ADMIN/RCVD: CPT | Performed by: INTERNAL MEDICINE

## 2019-02-25 PROCEDURE — G8400 PT W/DXA NO RESULTS DOC: HCPCS | Performed by: INTERNAL MEDICINE

## 2019-02-25 PROCEDURE — 1101F PT FALLS ASSESS-DOCD LE1/YR: CPT | Performed by: INTERNAL MEDICINE

## 2019-02-25 PROCEDURE — 4004F PT TOBACCO SCREEN RCVD TLK: CPT | Performed by: INTERNAL MEDICINE

## 2019-02-25 PROCEDURE — 1123F ACP DISCUSS/DSCN MKR DOCD: CPT | Performed by: INTERNAL MEDICINE

## 2019-02-25 RX ORDER — FERROUS SULFATE 325(65) MG
325 TABLET ORAL
COMMUNITY
End: 2019-07-23

## 2019-02-25 RX ORDER — MELOXICAM 15 MG/1
15 TABLET ORAL DAILY
COMMUNITY
End: 2019-04-30

## 2019-02-25 RX ORDER — VALSARTAN 320 MG/1
320 TABLET ORAL DAILY
COMMUNITY
End: 2019-04-15

## 2019-02-26 ENCOUNTER — TELEPHONE (OUTPATIENT)
Dept: ORTHOPEDIC SURGERY | Age: 79
End: 2019-02-26

## 2019-02-27 ENCOUNTER — APPOINTMENT (OUTPATIENT)
Dept: GENERAL RADIOLOGY | Age: 79
End: 2019-02-27
Attending: PHYSICAL MEDICINE & REHABILITATION
Payer: MEDICARE

## 2019-02-27 ENCOUNTER — HOSPITAL ENCOUNTER (OUTPATIENT)
Age: 79
Setting detail: OUTPATIENT SURGERY
Discharge: HOME | End: 2019-02-27
Attending: PHYSICAL MEDICINE & REHABILITATION | Admitting: PHYSICAL MEDICINE & REHABILITATION
Payer: MEDICARE

## 2019-02-27 VITALS
TEMPERATURE: 97.4 F | RESPIRATION RATE: 16 BRPM | HEART RATE: 58 BPM | HEIGHT: 67 IN | WEIGHT: 173 LBS | DIASTOLIC BLOOD PRESSURE: 61 MMHG | BODY MASS INDEX: 27.15 KG/M2 | SYSTOLIC BLOOD PRESSURE: 131 MMHG | OXYGEN SATURATION: 100 %

## 2019-02-27 LAB
GLUCOSE BLD-MCNC: 142 MG/DL (ref 70–99)
PERFORMED ON: ABNORMAL

## 2019-02-27 PROCEDURE — 3610000056 HC PAIN LEVEL 4 BASE (NON-OR): Performed by: PHYSICAL MEDICINE & REHABILITATION

## 2019-02-27 PROCEDURE — 99152 MOD SED SAME PHYS/QHP 5/>YRS: CPT | Performed by: PHYSICAL MEDICINE & REHABILITATION

## 2019-02-27 PROCEDURE — 2709999900 HC NON-CHARGEABLE SUPPLY: Performed by: PHYSICAL MEDICINE & REHABILITATION

## 2019-02-27 PROCEDURE — 2500000003 HC RX 250 WO HCPCS: Performed by: PHYSICAL MEDICINE & REHABILITATION

## 2019-02-27 PROCEDURE — 3209999900 FLUORO FOR SURGICAL PROCEDURES

## 2019-02-27 PROCEDURE — 6360000002 HC RX W HCPCS: Performed by: PHYSICAL MEDICINE & REHABILITATION

## 2019-02-27 RX ORDER — MIDAZOLAM HYDROCHLORIDE 1 MG/ML
INJECTION INTRAMUSCULAR; INTRAVENOUS
Status: COMPLETED | OUTPATIENT
Start: 2019-02-27 | End: 2019-02-27

## 2019-02-27 RX ORDER — METHYLPREDNISOLONE ACETATE 80 MG/ML
INJECTION, SUSPENSION INTRA-ARTICULAR; INTRALESIONAL; INTRAMUSCULAR; SOFT TISSUE
Status: COMPLETED | OUTPATIENT
Start: 2019-02-27 | End: 2019-02-27

## 2019-02-27 RX ORDER — BUPIVACAINE HYDROCHLORIDE 5 MG/ML
INJECTION, SOLUTION PERINEURAL
Status: COMPLETED | OUTPATIENT
Start: 2019-02-27 | End: 2019-02-27

## 2019-02-27 RX ORDER — LIDOCAINE HYDROCHLORIDE 10 MG/ML
INJECTION, SOLUTION INFILTRATION; PERINEURAL
Status: COMPLETED | OUTPATIENT
Start: 2019-02-27 | End: 2019-02-27

## 2019-02-27 ASSESSMENT — PAIN DESCRIPTION - LOCATION
LOCATION: BACK
LOCATION: BACK

## 2019-02-27 ASSESSMENT — PAIN DESCRIPTION - ORIENTATION
ORIENTATION: LOWER
ORIENTATION: LOWER

## 2019-02-27 ASSESSMENT — PAIN SCALES - GENERAL
PAINLEVEL_OUTOF10: 3
PAINLEVEL_OUTOF10: 3

## 2019-02-27 ASSESSMENT — PAIN DESCRIPTION - DESCRIPTORS: DESCRIPTORS: ACHING

## 2019-02-27 ASSESSMENT — PAIN - FUNCTIONAL ASSESSMENT: PAIN_FUNCTIONAL_ASSESSMENT: 0-10

## 2019-03-11 ENCOUNTER — OFFICE VISIT (OUTPATIENT)
Dept: ORTHOPEDIC SURGERY | Age: 79
End: 2019-03-11
Payer: MEDICARE

## 2019-03-11 VITALS
HEART RATE: 67 BPM | HEIGHT: 67 IN | SYSTOLIC BLOOD PRESSURE: 122 MMHG | DIASTOLIC BLOOD PRESSURE: 72 MMHG | BODY MASS INDEX: 27.16 KG/M2 | WEIGHT: 173.06 LBS

## 2019-03-11 DIAGNOSIS — M43.16 SPONDYLOLISTHESIS OF LUMBAR REGION: ICD-10-CM

## 2019-03-11 DIAGNOSIS — M51.36 DDD (DEGENERATIVE DISC DISEASE), LUMBAR: Primary | ICD-10-CM

## 2019-03-11 DIAGNOSIS — M47.816 SPONDYLOSIS OF LUMBAR REGION WITHOUT MYELOPATHY OR RADICULOPATHY: ICD-10-CM

## 2019-03-11 PROCEDURE — G8400 PT W/DXA NO RESULTS DOC: HCPCS | Performed by: PHYSICAL MEDICINE & REHABILITATION

## 2019-03-11 PROCEDURE — 1090F PRES/ABSN URINE INCON ASSESS: CPT | Performed by: PHYSICAL MEDICINE & REHABILITATION

## 2019-03-11 PROCEDURE — G8427 DOCREV CUR MEDS BY ELIG CLIN: HCPCS | Performed by: PHYSICAL MEDICINE & REHABILITATION

## 2019-03-11 PROCEDURE — G8417 CALC BMI ABV UP PARAM F/U: HCPCS | Performed by: PHYSICAL MEDICINE & REHABILITATION

## 2019-03-11 PROCEDURE — 4040F PNEUMOC VAC/ADMIN/RCVD: CPT | Performed by: PHYSICAL MEDICINE & REHABILITATION

## 2019-03-11 PROCEDURE — G8598 ASA/ANTIPLAT THER USED: HCPCS | Performed by: PHYSICAL MEDICINE & REHABILITATION

## 2019-03-11 PROCEDURE — 1123F ACP DISCUSS/DSCN MKR DOCD: CPT | Performed by: PHYSICAL MEDICINE & REHABILITATION

## 2019-03-11 PROCEDURE — 99214 OFFICE O/P EST MOD 30 MIN: CPT | Performed by: PHYSICAL MEDICINE & REHABILITATION

## 2019-03-11 PROCEDURE — G8484 FLU IMMUNIZE NO ADMIN: HCPCS | Performed by: PHYSICAL MEDICINE & REHABILITATION

## 2019-03-11 PROCEDURE — 4004F PT TOBACCO SCREEN RCVD TLK: CPT | Performed by: PHYSICAL MEDICINE & REHABILITATION

## 2019-03-11 PROCEDURE — 1101F PT FALLS ASSESS-DOCD LE1/YR: CPT | Performed by: PHYSICAL MEDICINE & REHABILITATION

## 2019-03-12 ENCOUNTER — TELEPHONE (OUTPATIENT)
Dept: ORTHOPEDIC SURGERY | Age: 79
End: 2019-03-12

## 2019-03-12 ENCOUNTER — TELEPHONE (OUTPATIENT)
Dept: CARDIOLOGY CLINIC | Age: 79
End: 2019-03-12

## 2019-03-15 ENCOUNTER — TELEPHONE (OUTPATIENT)
Dept: ORTHOPEDIC SURGERY | Age: 79
End: 2019-03-15

## 2019-03-19 NOTE — PROGRESS NOTES
PATIENT REACHED   YES_X___NO____    PREOP INSTUCTIONS     DATE_3/27/19________ TIME_0830________ARRIVAL_0730_______PLACE_masc___________  NOTHING TO EAT OR DRINK  6 HOURS PRIOR TO PROCEDURE START TIME  YOU NEED A RESPONSIBLE ADULT AGE 18 OR OLDER TO DRIVE YOU HOME  PLEASE BRING INSURANCE CARD. PICTURE ID AND COMPLETE LIST OF MEDS  WEAR LOOSE COMFORTABLE CLOTHING  FOLLOW ANY INSTRUCTIONS YOUR D[facility] OFFICE HAS GIVEN YOU,INCLUDING WHAT MEDICATIONS TO TAKE THE AM OF PROCEDURE AND WHEN AND IF YOU NEED TO STOP ANY BLOOD THINNERS. IF YOU HAVE QUESTIONS REGARDING THIS CALL THE OFFICE  THE GOAL BLOOD SUGAR THE AM OF PROCEDURE  OR LESS ABOVE THAT THE PROCEDURE MAY BE CANCELLED  ANY QUESTIONS CALL YOUR DOCTOR. ALSO,PLEASE READ THE INSTRUCTION PACKET FROM YOUR DR IF YOU RECEIVED ONE.   SPINE INTERVENTION NUMBER [phone]

## 2019-03-27 ENCOUNTER — HOSPITAL ENCOUNTER (OUTPATIENT)
Age: 79
Setting detail: OUTPATIENT SURGERY
Discharge: HOME | End: 2019-03-27
Attending: PHYSICAL MEDICINE & REHABILITATION | Admitting: PHYSICAL MEDICINE & REHABILITATION
Payer: MEDICARE

## 2019-03-27 ENCOUNTER — APPOINTMENT (OUTPATIENT)
Dept: GENERAL RADIOLOGY | Age: 79
End: 2019-03-27
Attending: PHYSICAL MEDICINE & REHABILITATION
Payer: MEDICARE

## 2019-03-27 VITALS
TEMPERATURE: 97 F | RESPIRATION RATE: 18 BRPM | WEIGHT: 173 LBS | HEIGHT: 67 IN | HEART RATE: 52 BPM | DIASTOLIC BLOOD PRESSURE: 59 MMHG | BODY MASS INDEX: 27.15 KG/M2 | SYSTOLIC BLOOD PRESSURE: 135 MMHG | OXYGEN SATURATION: 99 %

## 2019-03-27 LAB
GLUCOSE BLD-MCNC: 147 MG/DL (ref 70–99)
PERFORMED ON: ABNORMAL

## 2019-03-27 PROCEDURE — 2709999900 HC NON-CHARGEABLE SUPPLY: Performed by: PHYSICAL MEDICINE & REHABILITATION

## 2019-03-27 PROCEDURE — 2580000003 HC RX 258: Performed by: PHYSICAL MEDICINE & REHABILITATION

## 2019-03-27 PROCEDURE — 99152 MOD SED SAME PHYS/QHP 5/>YRS: CPT | Performed by: PHYSICAL MEDICINE & REHABILITATION

## 2019-03-27 PROCEDURE — 3610000054 HC PAIN LEVEL 3 BASE (NON-OR): Performed by: PHYSICAL MEDICINE & REHABILITATION

## 2019-03-27 PROCEDURE — 6360000002 HC RX W HCPCS: Performed by: PHYSICAL MEDICINE & REHABILITATION

## 2019-03-27 PROCEDURE — 2500000003 HC RX 250 WO HCPCS: Performed by: PHYSICAL MEDICINE & REHABILITATION

## 2019-03-27 PROCEDURE — 77003 FLUOROGUIDE FOR SPINE INJECT: CPT

## 2019-03-27 RX ORDER — LIDOCAINE HYDROCHLORIDE 10 MG/ML
INJECTION, SOLUTION INFILTRATION; PERINEURAL
Status: COMPLETED | OUTPATIENT
Start: 2019-03-27 | End: 2019-03-27

## 2019-03-27 RX ORDER — MIDAZOLAM HYDROCHLORIDE 1 MG/ML
INJECTION INTRAMUSCULAR; INTRAVENOUS
Status: COMPLETED | OUTPATIENT
Start: 2019-03-27 | End: 2019-03-27

## 2019-03-27 RX ORDER — 0.9 % SODIUM CHLORIDE 0.9 %
VIAL (ML) INJECTION
Status: COMPLETED | OUTPATIENT
Start: 2019-03-27 | End: 2019-03-27

## 2019-03-27 RX ORDER — METHYLPREDNISOLONE ACETATE 80 MG/ML
INJECTION, SUSPENSION INTRA-ARTICULAR; INTRALESIONAL; INTRAMUSCULAR; SOFT TISSUE
Status: COMPLETED | OUTPATIENT
Start: 2019-03-27 | End: 2019-03-27

## 2019-03-27 ASSESSMENT — PAIN SCALES - GENERAL
PAINLEVEL_OUTOF10: 0
PAINLEVEL_OUTOF10: 0

## 2019-03-27 ASSESSMENT — PAIN - FUNCTIONAL ASSESSMENT: PAIN_FUNCTIONAL_ASSESSMENT: 0-10

## 2019-03-27 NOTE — H&P
Yes Manuela Angulo MD   cetirizine (ZYRTEC) 10 MG tablet Take 10 mg by mouth daily   Yes Historical Provider, MD   nebivolol (BYSTOLIC) 5 MG tablet Take 5 mg by mouth daily 2.5   Yes Historical Provider, MD   alprazolam (XANAX) 0.5 MG tablet Take 0.5 mg by mouth 3 times daily as needed. 8/19/10  Yes Historical Provider, MD   metformin (GLUCOPHAGE) 1000 MG tablet Take 500 mg by mouth 2 times daily (with meals)    Yes Historical Provider, MD   venlafaxine (EFFEXOR XR) 150 MG XR capsule Take 150 mg by mouth daily. Yes Historical Provider, MD   aspirin 81 MG EC tablet Take 81 mg by mouth daily. Yes Historical Provider, MD   atorvastatin (LIPITOR) 80 MG tablet Take 80 mg by mouth nightly. 8/19/10  Yes Historical Provider, MD   glipiZIDE (GLUCOTROL XL) 2.5 MG CR tablet Take 2.5 mg by mouth daily. Yes Historical Provider, MD   ferrous sulfate 325 (65 Fe) MG tablet Take 325 mg by mouth daily (with breakfast)    Historical Provider, MD   meloxicam (MOBIC) 15 MG tablet Take 15 mg by mouth daily    Historical Provider, MD   meclizine (ANTIVERT) 25 MG tablet Take 1 tablet by mouth 2 times daily  Patient taking differently: Take 25 mg by mouth as needed  1/4/19   Manuela Angulo MD   doxycycline monohydrate (MONODOX) 100 MG capsule Take 1 capsule by mouth 2 times daily 12/7/18   Manuela Angulo MD   pantoprazole (PROTONIX) 40 MG tablet Take 40 mg by mouth daily    Historical Provider, MD   clopidogrel (PLAVIX) 75 MG tablet Take 75 mg by mouth daily. Historical Provider, MD     Allergies:  Levofloxacin; Actos [pioglitazone hydrochloride]; Bactrim; Cephalexin; Lisinopril; Trazodone and nefazodone; and Vytorin [ezetimibe-simvastatin]  Social History:    reports that she has been smoking e-cigarettes. She has been smoking about 0.25 packs per day. She has never used smokeless tobacco. She reports that she does not drink alcohol or use drugs.   Family History:   Family History   Problem Relation Age of Onset    Diabetes Sister        Vitals: Blood pressure 130/66, pulse 84, temperature 97 °F (36.1 °C), temperature source Temporal, resp. rate 16, height 5' 7\" (1.702 m), weight 173 lb (78.5 kg), SpO2 98 %, not currently breastfeeding. PHYSICAL EXAM:including affected areas  HENT: Airway patent and reviewed  Cardiovascular: Normal rate, regular rhythm, normal heart sounds. Pulmonary/Chest: No wheezes. No rhonchi. No rales. Abdominal: Soft. Bowel sounds are normal. No distension. Extremities: Moves all extremities equally  Cervical and Lumbar Spine: Painful range of motion, no midline tenderness       Diagnosis:Lumbar foraminal stenosis  M51.36   M47.816    M43.16    Plan: Proceed with planned procedure      ASA CLASS:         []   I. Normal, healthy adult           [x]   II.  Mild systemic disease            []   III. Severe systemic disease      Mallampati: Mallampati Class II - (soft palate, fauces & uvula are visible)      Sedation plan:   [x]  Local              []  Minimal                  []  General anesthesia    Patient's condition acceptable for planned procedure/sedation. Post Procedure Plan   Return to same level of care   ______________________     The risks and benefits as well as alternatives to the procedure have been discussed with the patient and or family. The patient and or next of kin understands and agrees to proceed.     Alison Booth M.D.

## 2019-03-27 NOTE — OP NOTE
Patient:  Ovidio Gandhi  YOB: 1940  Medical Record #:  [id number]   Place:   [address] [place] [address]  Date:  3/27/2019   Physician:  Enoch Lindsey MD, ANGELO    Procedure:  Lumbar Epidural Steroid Injection - Interlaminar approach  L4 - L5    Pre-Procedure Diagnosis: Lumbar foraminal stenosis    Post-Procedure Diagnosis: Same    Sedation: Local with 1% Lidocaine 3 ml and 2 mg of IV Versed    EBL: None    Complications: None    Procedure Summary:    The patient was brought to the procedure suite and placed in the prone position. The skin overlying the lumbar spine was prepped and draped in the usual sterile fashion. Using fluoroscopic guidance, the L4 - L5 interlaminar space was identified. Through anesthetized skin a 20 gauge Touhy needle was advanced into the epidural space using continuous loss of resistance to saline technique. Isovue M300 was instilled and an epidurogram was noted without evidence of intrathecal or vascular spread. 10 ml of a solution containing preservative free normal saline and 80 mg of Depomedrol was instilled. The needle was removed and a band-aid applied. The patient was transferred to the post-operative area in stable condition.

## 2019-03-27 NOTE — PROGRESS NOTES
IV discontinued, catheter intact, and dressing applied. Procedural dressing dry and intact. Bilateral lower extremities equal in strength. Discharge instructions reviewed with patient or responsible adult, signed and copy given. All home medications have been reviewed. All questions answered and patient or responsible adult verbalized understanding.   PAIN LEVEL AT DISCHARGE __0___

## 2019-04-12 ENCOUNTER — OFFICE VISIT (OUTPATIENT)
Dept: ORTHOPEDIC SURGERY | Age: 79
End: 2019-04-12
Payer: MEDICARE

## 2019-04-12 VITALS — BODY MASS INDEX: 27.15 KG/M2 | WEIGHT: 173 LBS | HEIGHT: 67 IN

## 2019-04-12 DIAGNOSIS — M47.816 SPONDYLOSIS OF LUMBAR REGION WITHOUT MYELOPATHY OR RADICULOPATHY: ICD-10-CM

## 2019-04-12 DIAGNOSIS — M51.36 DDD (DEGENERATIVE DISC DISEASE), LUMBAR: Primary | ICD-10-CM

## 2019-04-12 DIAGNOSIS — M43.16 SPONDYLOLISTHESIS OF LUMBAR REGION: ICD-10-CM

## 2019-04-12 DIAGNOSIS — M48.061 LUMBAR FORAMINAL STENOSIS: ICD-10-CM

## 2019-04-12 PROCEDURE — 4040F PNEUMOC VAC/ADMIN/RCVD: CPT | Performed by: PHYSICAL MEDICINE & REHABILITATION

## 2019-04-12 PROCEDURE — 1090F PRES/ABSN URINE INCON ASSESS: CPT | Performed by: PHYSICAL MEDICINE & REHABILITATION

## 2019-04-12 PROCEDURE — 4004F PT TOBACCO SCREEN RCVD TLK: CPT | Performed by: PHYSICAL MEDICINE & REHABILITATION

## 2019-04-12 PROCEDURE — G8400 PT W/DXA NO RESULTS DOC: HCPCS | Performed by: PHYSICAL MEDICINE & REHABILITATION

## 2019-04-12 PROCEDURE — G8598 ASA/ANTIPLAT THER USED: HCPCS | Performed by: PHYSICAL MEDICINE & REHABILITATION

## 2019-04-12 PROCEDURE — 99213 OFFICE O/P EST LOW 20 MIN: CPT | Performed by: PHYSICAL MEDICINE & REHABILITATION

## 2019-04-12 PROCEDURE — G8427 DOCREV CUR MEDS BY ELIG CLIN: HCPCS | Performed by: PHYSICAL MEDICINE & REHABILITATION

## 2019-04-12 PROCEDURE — 1123F ACP DISCUSS/DSCN MKR DOCD: CPT | Performed by: PHYSICAL MEDICINE & REHABILITATION

## 2019-04-12 PROCEDURE — G8417 CALC BMI ABV UP PARAM F/U: HCPCS | Performed by: PHYSICAL MEDICINE & REHABILITATION

## 2019-04-12 NOTE — PROGRESS NOTES
Follow up: SPINE    Nirmala Sibley  1940  [id number]      CHIEF COMPLAINT:    Chief Complaint   Patient presents with    Back Pain     F/U LUMBAR PAIN. IL 3/27, FEELING MUCH BETTER GENERALLY NO PAIN, WILL ONLY FEEL SOME IF ACTIVITY IS INCREASED         HISTORY OF PRESENT ILLNESS:  Ms. Italo Acuna is a 66 y.o. female returns for a follow up visit for multiple medical problems. Her current presenting problems are   1. DDD (degenerative disc disease), lumbar    2. Spondylosis of lumbar region without myelopathy or radiculopathy    3. Lumbar foraminal stenosis    4. Spondylolisthesis of lumbar region    . As per information/history obtained from the PADT(patient assessment and documentation tool) - She complains of pain in the lower back with radiation to the hips Right She rates the pain 2/10 and describes it as aching. Pain is made worse by: walking. She denies side effects from the current pain regimen. Patient reports that since the last follow up visit the physical functioning is better, family/social relationships are better, mood is better and sleep patterns are better, and that the overall functioning is better. Patient denies neurological bowel or bladder. She presents after undergoing an L4 5 interlaminar approach epidural steroid injection. She has had 80% pain relief. She no longer has any significant back pain limiting her functioning. She is supposed be set up for a hip replacement but has been holding off to her back pain.         Associated signs and symptoms:   Neurogenic bowel or bladder symptoms:  no   Perceived weakness:  no   Difficulty walking:  yes              Past Medical History:   Past Medical History:   Diagnosis Date    Arthritis     CAD (coronary artery disease)     Diabetes mellitus ([address]     Hyperlipidemia     Hypertension     PONV (postoperative nausea and vomiting)       Past Surgical History:     Past Surgical History:   Procedure Laterality Date    COLONOSCOPY (GLUCOPHAGE) 1000 MG tablet, Take 500 mg by mouth 2 times daily (with meals) , Disp: , Rfl:     venlafaxine (EFFEXOR XR) 150 MG XR capsule, Take 150 mg by mouth daily. , Disp: , Rfl:     aspirin 81 MG EC tablet, Take 81 mg by mouth daily. , Disp: , Rfl:     atorvastatin (LIPITOR) 80 MG tablet, Take 80 mg by mouth nightly., Disp: , Rfl:     clopidogrel (PLAVIX) 75 MG tablet, Take 75 mg by mouth daily. , Disp: , Rfl:     glipiZIDE (GLUCOTROL XL) 2.5 MG CR tablet, Take 2.5 mg by mouth daily. , Disp: , Rfl:   Allergies:  Levofloxacin; Actos [pioglitazone hydrochloride]; Bactrim; Cephalexin; Lisinopril; Trazodone and nefazodone; and Vytorin [ezetimibe-simvastatin]  Social History:    reports that she has been smoking e-cigarettes. She has been smoking about 0.25 packs per day. She has never used smokeless tobacco. She reports that she does not drink alcohol or use drugs. Family History:   Family History   Problem Relation Age of Onset    Diabetes Sister        REVIEW OF SYSTEMS:   CONSTITUTIONAL: Denies unexplained weight loss, fevers, chills or fatigue  NEUROLOGICAL: Denies unsteady gait or progressive weakness  MUSCULOSKELETAL: Denies joint swelling or redness  GI: Denies nausea, vomiting, diarrhea   : Denies bowel or bladder issues       PHYSICAL EXAM:    Vitals: Height 5' 7\" (1.702 m), weight 173 lb (78.5 kg), not currently breastfeeding. GENERAL EXAM:  · General Apparence: Patient is adequately groomed with no evidence of malnutrition. · Psychiatric: Orientation: The patient is oriented to time, place and person. The patient's mood and affect are appropriate   · Vascular: Examination reveals no swelling and palpation reveals no tenderness in upper or lower extremities. Good capillary refill.    · The lymphatic examination of the neck, axillae and groin reveals all areas to be without enlargement or induration  · Sensation is intact without deficit in the upper and lower extremities to light touch and pinprick  · Coordination of the upper and lower extremities are normal.    · RIGHT UPPER EXTREMITY:  Inspection/examination of the right upper extremity does not show any tenderness, deformity or injury. Range of motion is unremarkable and pain-free. There is no gross instability. There are no rashes, ulcerations or lesions. Strength and tone are normal. No atrophy or abnormal movements are noted. · LEFT UPPER EXTREMITY: Inspection/examination of the left upper extremity does not show any tenderness, deformity or injury. Range of motion is unremarkable and pain-free. There is no gross instability. There are no rashes, ulcerations or lesions. Strength and tone are normal. No atrophy or abnormal movements are noted. LUMBAR/SACRAL EXAMINATION:  · Inspection: Local inspection shows no step-off or bruising. Lumbar alignment is normal. No instability is noted. · Palpation:   No evidence of tenderness at the midline. Lumbar paraspinal tenderness: Mild L4/5 and L5/S1 tenderness  Bursal tenderness No tenderness bilaterally  There is no paraspinal spasm. · Range of Motion: limited by 25% in all planes due to pain  · Strength:   Strength testing is 5/5 in all muscle groups tested. · Special Tests:   Straight leg raise and crossed SLR negative. · Skin: There are no rashes, ulcerations or lesions. · Reflexes: Reflexes are symmetrically 2+ at the patellar and ankle tendons. Clonus absent bilaterally at the feet. · Gait & station: antalgic on the right  · Additional Examinations:  · RIGHT LOWER EXTREMITY: Inspection/examination of the right lower extremity does not show any tenderness, deformity or injury. Range of motion is normal and pain-free. There is no gross instability. There are no rashes, ulcerations or lesions. Strength and tone are normal. No atrophy or abnormal movements are noted.   · LEFT LOWER EXTREMITY:  Inspection/examination of the left lower extremity does not show any tenderness, deformity or injury. Range of motion is normal and pain-free. There is no gross instability. There are no rashes, ulcerations or lesions. Strength and tone are normal. No atrophy or abnormal movements are noted. Diagnostic Testing:    MR Lumbar 10/2018 -  shows mild multilevel degenerative changes with severe hypertrophy and anterior subluxation of the L4-L5      Results for orders placed or performed during the hospital encounter of 19   POCT Glucose   Result Value Ref Range    POC Glucose 147 (H) 70 - 99 mg/dl    Performed on ACCU-CHEK      Impression:       1. DDD (degenerative disc disease), lumbar    2. Spondylosis of lumbar region without myelopathy or radiculopathy    3. Lumbar foraminal stenosis    4. Spondylolisthesis of lumbar region        Plan:  Clinical Course: Above diagnoses are improving     I discussed the diagnosis and the treatment options with Cynthia Joseph today. In Summary:  The various treatment options were outlined and discussed with Cynthia Joseph including:  Conservative care options: physical therapy, ice, medications, bracing, and activity modification. The indications for therapeutic injections. The indications for additional imaging/laboratory studies. The indications for (possible future) interventions. After considering the various options discussed, Cynthia Joseph elected to pursue a course of treatment that includes the followin. Medications:  No further recommendations for new medications. 2. PT:  Encouraged to continue with HEP. 3. Further studies:  Refer back to Dr True Diehl for the hip OA    4. Interventional:  80% relief after LESI    5. Follow up:  4-6 weeks      Cynthia Joseph was instructed to call the office if her symptoms worsen or if new symptoms appear prior to the next scheduled visit.  She is specifically instructed to contact the office between now & her scheduled appointment if she has concerns related to her condition or if she needs assistance in scheduling the above tests. She is welcome to call for an appointment sooner if she has any additional concerns or questions. Taylor Lozano MD, ANGELO, [facility]  Board Certified in [address] [place] [address] Certified and Fellowship Trained in [facility] ([facility])             This dictation was performed with a verbal recognition program [facility]) and it was checked for errors. It is possible that there are still dictated errors within this office note. If so, please bring any errors to my attention for an addendum. All efforts were made to ensure that this office note is accurate.

## 2019-04-15 ENCOUNTER — TELEPHONE (OUTPATIENT)
Dept: CARDIOLOGY CLINIC | Age: 79
End: 2019-04-15

## 2019-04-15 RX ORDER — IRBESARTAN 300 MG/1
300 TABLET ORAL
COMMUNITY
End: 2019-07-23

## 2019-04-15 NOTE — TELEPHONE ENCOUNTER
Pharmacy contacted us about Irbesartan. I called pt to get clarification about which med she is taking. She is not taking Diovan. She is taking Irbesartan 300 mg 1/2 tab daily. The brand she has is not affected by the recall I advised she cont to take it.

## 2019-04-30 PROBLEM — E87.5 HYPERKALEMIA: Status: ACTIVE | Noted: 2019-04-30

## 2019-04-30 PROBLEM — N17.9 AKI (ACUTE KIDNEY INJURY) (HCC): Status: ACTIVE | Noted: 2019-04-30

## 2019-05-29 ENCOUNTER — OFFICE VISIT (OUTPATIENT)
Dept: ENT CLINIC | Age: 79
End: 2019-05-29
Payer: MEDICARE

## 2019-05-29 VITALS — OXYGEN SATURATION: 98 % | SYSTOLIC BLOOD PRESSURE: 130 MMHG | HEART RATE: 58 BPM | DIASTOLIC BLOOD PRESSURE: 62 MMHG

## 2019-05-29 DIAGNOSIS — H61.23 BILATERAL IMPACTED CERUMEN: Primary | ICD-10-CM

## 2019-05-29 PROCEDURE — 69210 REMOVE IMPACTED EAR WAX UNI: CPT | Performed by: OTOLARYNGOLOGY

## 2019-05-29 NOTE — PROGRESS NOTES
Patient had a recent upper infection in her right ear which was treated by her PCP. At that time, it was noted that there was cerumen accumulating in her right ear. She does wear hearing aids in both ears. No fevers been noted no ear pain now is present. She's had no vertigo and no change in tinnitus. Currently, she appears in obvious acute distress. Ear examination reveals some mild accumulation of cerumen in both ear canals which is removed with curettes. The ears were then cleaned with peroxide. The tube A membranes on both sides now. A being unremarkable. Ear canals are now clear. She is capable wearing her hearing aids. Nasal examination is unremarkable. The oral examination is likewise normal.  Neck is free of any adenopathy or mass, thyroid enlargement.

## 2019-06-14 ENCOUNTER — TELEPHONE (OUTPATIENT)
Dept: ENT CLINIC | Age: 79
End: 2019-06-14

## 2019-06-14 DIAGNOSIS — H92.10 OTORRHEA, UNSPECIFIED LATERALITY: Primary | ICD-10-CM

## 2019-06-14 NOTE — TELEPHONE ENCOUNTER
PT called , states her ear is no better, says you \"scratched\" it? Has been using peroxide. Its running, had strep  In it a while ago, has odor to it. Started day she came in last time.   Asking what she should do

## 2019-06-18 ENCOUNTER — OFFICE VISIT (OUTPATIENT)
Dept: ENT CLINIC | Age: 79
End: 2019-06-18
Payer: MEDICARE

## 2019-06-18 VITALS — OXYGEN SATURATION: 98 % | DIASTOLIC BLOOD PRESSURE: 62 MMHG | SYSTOLIC BLOOD PRESSURE: 120 MMHG | HEART RATE: 60 BPM

## 2019-06-18 DIAGNOSIS — H60.8X1 CHRONIC ECZEMATOUS OTITIS EXTERNA OF RIGHT EAR: Primary | ICD-10-CM

## 2019-06-18 PROCEDURE — 4040F PNEUMOC VAC/ADMIN/RCVD: CPT | Performed by: OTOLARYNGOLOGY

## 2019-06-18 PROCEDURE — 1123F ACP DISCUSS/DSCN MKR DOCD: CPT | Performed by: OTOLARYNGOLOGY

## 2019-06-18 PROCEDURE — 1090F PRES/ABSN URINE INCON ASSESS: CPT | Performed by: OTOLARYNGOLOGY

## 2019-06-18 PROCEDURE — G8598 ASA/ANTIPLAT THER USED: HCPCS | Performed by: OTOLARYNGOLOGY

## 2019-06-18 PROCEDURE — G8417 CALC BMI ABV UP PARAM F/U: HCPCS | Performed by: OTOLARYNGOLOGY

## 2019-06-18 PROCEDURE — 4004F PT TOBACCO SCREEN RCVD TLK: CPT | Performed by: OTOLARYNGOLOGY

## 2019-06-18 PROCEDURE — G8400 PT W/DXA NO RESULTS DOC: HCPCS | Performed by: OTOLARYNGOLOGY

## 2019-06-18 PROCEDURE — G8427 DOCREV CUR MEDS BY ELIG CLIN: HCPCS | Performed by: OTOLARYNGOLOGY

## 2019-06-18 PROCEDURE — 99212 OFFICE O/P EST SF 10 MIN: CPT | Performed by: OTOLARYNGOLOGY

## 2019-06-18 NOTE — PROGRESS NOTES
Since her previous ear cleaning, the patient has noted some pain and discomfort in her right ear. Eardrops caused a great deal of itching and so she stopped them. She has not been wearing hearing aids in either ear since the left one in fact seems to fall out rather than stay in in place. She has had no fever. There is been no significant drainage and no change in tinnitus. No vertigo is been noted. Currently, she does not appear to be in acute distress. Ear examination on the left reveals a normal appearing tympanic membrane and ear canal.  On the right, however there is some marked narrowing to the ear canal with only minimal irritation noted. Upon cleaning with peroxide, the tympanic membrane appears to be intact with no inflammation or drainage. The ear canal was then treated with gentian violet. Oral examination remains unremarkable. The nasal mucosa is normal as well. The neck is free of any adenopathy, mass, thyroid enlargement. I have discussed with the patient the likelihood that she needs to have at least the left hearing aid modified to fit better. She is spent a great deal of funds on this hearing aid and I would dislike the fact that it would not be used. She will see our audiologist in this regard. No eardrops or other treatment is indicated in the right ear. I reassured her of this.

## 2019-07-12 ENCOUNTER — OFFICE VISIT (OUTPATIENT)
Dept: ORTHOPEDIC SURGERY | Age: 79
End: 2019-07-12
Payer: MEDICARE

## 2019-07-12 ENCOUNTER — TELEPHONE (OUTPATIENT)
Dept: ORTHOPEDIC SURGERY | Age: 79
End: 2019-07-12

## 2019-07-12 ENCOUNTER — TELEPHONE (OUTPATIENT)
Dept: CARDIOLOGY CLINIC | Age: 79
End: 2019-07-12

## 2019-07-12 VITALS
WEIGHT: 180 LBS | HEIGHT: 67 IN | DIASTOLIC BLOOD PRESSURE: 66 MMHG | BODY MASS INDEX: 28.25 KG/M2 | RESPIRATION RATE: 14 BRPM | SYSTOLIC BLOOD PRESSURE: 140 MMHG | HEART RATE: 60 BPM

## 2019-07-12 DIAGNOSIS — M48.061 LUMBAR STENOSIS WITHOUT NEUROGENIC CLAUDICATION: Primary | ICD-10-CM

## 2019-07-12 DIAGNOSIS — M48.061 LUMBAR FORAMINAL STENOSIS: ICD-10-CM

## 2019-07-12 DIAGNOSIS — M43.16 SPONDYLOLISTHESIS OF LUMBAR REGION: ICD-10-CM

## 2019-07-12 DIAGNOSIS — M51.36 DDD (DEGENERATIVE DISC DISEASE), LUMBAR: ICD-10-CM

## 2019-07-12 DIAGNOSIS — M54.16 LUMBAR RADICULOPATHY: ICD-10-CM

## 2019-07-12 PROCEDURE — G8400 PT W/DXA NO RESULTS DOC: HCPCS | Performed by: PHYSICAL MEDICINE & REHABILITATION

## 2019-07-12 PROCEDURE — 4040F PNEUMOC VAC/ADMIN/RCVD: CPT | Performed by: PHYSICAL MEDICINE & REHABILITATION

## 2019-07-12 PROCEDURE — G8598 ASA/ANTIPLAT THER USED: HCPCS | Performed by: PHYSICAL MEDICINE & REHABILITATION

## 2019-07-12 PROCEDURE — G8427 DOCREV CUR MEDS BY ELIG CLIN: HCPCS | Performed by: PHYSICAL MEDICINE & REHABILITATION

## 2019-07-12 PROCEDURE — 99214 OFFICE O/P EST MOD 30 MIN: CPT | Performed by: PHYSICAL MEDICINE & REHABILITATION

## 2019-07-12 PROCEDURE — 1123F ACP DISCUSS/DSCN MKR DOCD: CPT | Performed by: PHYSICAL MEDICINE & REHABILITATION

## 2019-07-12 PROCEDURE — 4004F PT TOBACCO SCREEN RCVD TLK: CPT | Performed by: PHYSICAL MEDICINE & REHABILITATION

## 2019-07-12 PROCEDURE — G8417 CALC BMI ABV UP PARAM F/U: HCPCS | Performed by: PHYSICAL MEDICINE & REHABILITATION

## 2019-07-12 PROCEDURE — 1090F PRES/ABSN URINE INCON ASSESS: CPT | Performed by: PHYSICAL MEDICINE & REHABILITATION

## 2019-07-12 RX ORDER — ATORVASTATIN CALCIUM 80 MG/1
TABLET, FILM COATED ORAL
COMMUNITY
Start: 2019-05-29 | End: 2019-07-23

## 2019-07-12 RX ORDER — OLOPATADINE HYDROCHLORIDE 7 MG/ML
SOLUTION OPHTHALMIC
Refills: 1 | COMMUNITY
Start: 2019-05-21 | End: 2019-09-16 | Stop reason: ALTCHOICE

## 2019-07-12 NOTE — LETTER
[address] [place] [address] Y[address] [address]  Phone: [phone]  Fax: [phone]    Jamil Kearns MD        July 15, 2019     Patient: Mckinley Mason   YOB: 1940   Date of Visit: 7/12/2019       To Whom It May Concern: It is my medical opinion that Rodrick Panchal may hold her Plavix 7 days prior to epidural injections. If you have any questions or concerns, please don't hesitate to call.     Sincerely,        Jamil Kearns MD

## 2019-07-12 NOTE — LETTER
Please schedule the following with:     Date:   2019 @ 9:05     Account: [de-identified]  Patient: Rock Clarke    : 1940  Address:   [place] [address]    Phone (H):  [phone] (home)      ----------------------------------------------------------------------------------------------  Diagnosis:     ICD-10-CM    1. Lumbar stenosis without neurogenic claudication M48.061 Ambulatory referral to Physical Therapy         Levels:L4/5 Interlaminar MANUEL  Procedure type LESI  Side Midline  CPT Codes 38110    ----------------------------------------------------------------------------------------------  Injection #   [id number]    Attending Physician       Izabela Melendez. Jenn Carroll MD.      ----------------------------------------------------------------------------------------------  Injection Scheduled For:    At:    [facility]    Pre-Cert#    [facility]    Pre-Cert#    Comments or Special instructions:    · Infection control  ? Tested positive for MRSA in past 12 months:  no  ? Tested positive for MSSA \"staph infection\" in past 12 months: no  ? Tested positive for VRE (Vancomycin Resistant Enterococci) in past 12 months:   no  ? Currently on any antibiotics for an infection: no  · Anticoagulants:  ? On a blood thinner:  ASA/PLAVIX DR Tobias Hatchet [phone]  ?  Any history of bleeding disorder: no   · Advanced Liver disease: no   · Advanced Renal disease: no   · Glaucoma: no   · Diabetes: YES     Sedation:         yes  -----------------------------------------------------------------------------------------------  Allergies   Allergen Reactions    Cephalexin Other (See Comments)     Other reaction(s): Skin Rash    Doxycycline Nausea And Vomiting and Rash     Nausea and vomiting      Levofloxacin Hives    Levofloxacin Other (See Comments)    Sulfa Antibiotics Other (See Comments) and Nausea Only     Bactrim      Ezetimibe Other (See Comments)    Misoprostol Other (See Comments)

## 2019-07-15 ENCOUNTER — TELEPHONE (OUTPATIENT)
Dept: ENT CLINIC | Age: 79
End: 2019-07-15

## 2019-07-18 ENCOUNTER — HOSPITAL ENCOUNTER (OUTPATIENT)
Dept: PHYSICAL THERAPY | Age: 79
Setting detail: THERAPIES SERIES
Discharge: HOME | End: 2019-07-18
Payer: MEDICARE

## 2019-07-18 PROCEDURE — 97140 MANUAL THERAPY 1/> REGIONS: CPT

## 2019-07-18 PROCEDURE — 97110 THERAPEUTIC EXERCISES: CPT

## 2019-07-18 PROCEDURE — 97162 PT EVAL MOD COMPLEX 30 MIN: CPT

## 2019-07-18 NOTE — FLOWSHEET NOTE
Manual (68577) x  1    [] Other:  [] TA x       [] Mech Traction (72158)  [] ES(attended) (30589)      [] ES (un) (93150):     Goals:   Short Term Goals: To be achieved in: 2 weeks  1. Independent in HEP and progression per patient tolerance, in order to prevent re-injury. 2. Patient will have a decrease in pain to facilitate improvement in movement, function, and ADLs as indicated by Functional Deficits. Long Term Goals: To be achieved in: 6 weeks  1. Disability index score of 10% or less for the NELLY to assist with reaching prior level of function. 2. Patient will demonstrate increased AROM to WNL, good LS mobility, good hip ROM to allow for proper joint functioning as indicated by patients Functional Deficits. 3. Patient will demonstrate an increase in Strength to good proximal hip and core activation to allow for proper functional mobility as indicated by patients Functional Deficits. 4. Patient will return to prior functional activities without increased symptoms or restriction. 5. Pt will report ability to sleep throughout the night pain free and cook meal painfree. Progression Towards Functional goals:  [] Patient is progressing as expected towards functional goals listed. [] Progression is slowed due to complexities listed. [] Progression has been slowed due to co-morbidities.   [x] Plan just implemented, too soon to assess goals progression  [] Other:     ASSESSMENT:  See eval    Treatment/Activity Tolerance:  [] Patient tolerated treatment well [] Patient limited by fatique  [] Patient limited by pain  [] Patient limited by other medical complications  [] Other:     Prognosis: [] Good [] Fair  [] Poor    Patient Requires Follow-up: [x] Yes  [] No    PLAN: See eval  [] Continue per plan of care [] Alter current plan (see comments)  [x] Plan of care initiated [] Hold pending MD visit [] Discharge    Electronically signed by: Silvester Holter PT

## 2019-07-18 NOTE — PLAN OF CARE
[facility]  [address] [place] [address]  Phone [phone]    Fax [phone]                                                       Physical Therapy Certification    Dear Referring Practitioner: Nancy Blank MD,    We had the pleasure of evaluating the following patient for physical therapy services at [address] [place] [address]. A summary of our findings can be found in the initial assessment below. This includes our plan of care. If you have any questions or concerns regarding these findings, please do not hesitate to contact me at the office phone number checked above. Thank you for the referral.       Physician Signature:_______________________________Date:__________________  By signing above (or electronic signature), therapists plan is approved by physician      Patient: Teto Napoles   : 1940   MRN: [id number]  Referring Physician: Referring Practitioner: Nancy Blank MD      Evaluation Date: 2019      Medical Diagnosis Information:  Diagnosis: Lumbar Stenosis with neurogenic claudication M48.061   Treatment Diagnosis: Lumbar pain, Left hip Pain                                         Insurance information: PT Insurance Information: Medic[employer] (guidelines)     Precautions/ Contra-indications: none    Latex Allergy:  [x]NO      []YES  Preferred Language for Healthcare:   [x]English       []other:    SUBJECTIVE: Patient stated complaint: Pt states the hip and back have been an issue for about 4-5 months and have worsened in recent weeks. She needs to have her hip replaced but has other issues she needs to get resolved. Pt also has kindey issues, causing her to have trouble going to the bathroom. The low back pain occurs with walking and adls. Pt can not stand for long periods of time without the pain occurring. Even just walking between rooms will cause the pain to occur.  Pt denies any symptoms going down the leg. Numbness in feet present due to neuropathy. Pt describes the pain as an ache. When asked, patient reports increased sweating at night, mostly the face and is anemic. Doctors have not found cause of anemia yet. Relevant Medical History:neruopathy, anemia     Functional Disability Index/G-Codes:   NELLY Score: 38%    Pain Scale: 5-7/10  Easing factors: movement avoidence   Provocative factors: walking, adls    Type: []Constant   []Intermittent  []Radiating [x]Localized []other:     Numbness/Tingling: neuropathy in B feet     Occupation/School: retired     Living Status/Prior Level of Function: Independent with ADLs and IADLs. OBJECTIVE:     ROM     LUMBAR FLEX 40    LUMBAR EXT 8    Sidebend Mid thigh B  painful   Rotation 30%/40%     LEFT RIGHT   HIP Flex 82 95   HIP Abd     HIP ER 20 30   HIP IR     Knee Flex 120 122   Knee ext (5) (5)   Hamstring FLEX (50) (40)   Piriformis                Strength  LEFT RIGHT   MfA     TrA POOR POOR   HIP Flexors     HIP Abductors 3+ 4-   HIP ER     Hip IR     Knee EXT (quad) 4 4+   Knee Flex (HS) 4 4+   Ankle DF 5 5   Ankle PF     Great Toe Ext       Reflexes/Sensation:   [x]Dermatomes/Myotomes intact    []UE Reflexes     []Normal []Hypo      []Hyper   [x]LE Reflexes     [x]Normal []Hypo      []Hyper   [x]Babinski/Clonus/Hoffmans: normal   []Other:    Joint mobility: lumbo pelvlic L>R   []Normal    [x]Hypo   []Hyper    Palpation: guarding and muscle tightness throughout lateral hip musculature, lumbar paraspinals    Functional Mobility/Transfers: wnl, education on supine to sit to decrease LBP    Posture: R PSIS elevated, left SB, decreased lumbar lordosis,     Bandages/Dressings/Incisions: NA    Gait: (include devices/WB status) W/S to R, genus valgus, narrow BOA, shuffling gait, lack hip ext, mild vaulting on L    Orthopedic Special Tests: SLR in hip, no back bilateral                       [x] Patient history, allergies, meds reviewed.  Medical chart reviewed. See intake form. Review Of Systems (ROS):  [x]Performed Review of systems (Integumentary, CardioPulmonary, Neurological) by intake and observation. Intake form has been scanned into medical record. Patient has been instructed to contact their primary care physician regarding ROS issues if not already being addressed at this time. Co-morbidities/Complexities (which will affect course of rehabilitation):   []None           Arthritic conditions   []Rheumatoid arthritis (M05.9)  [x]Osteoarthritis (M19.91)   Cardiovascular conditions   [x]Hypertension (I10)  []Hyperlipidemia (E78.5)  []Angina pectoris (I20)  []Atherosclerosis (I70)   Musculoskeletal conditions   [x]Disc pathology   []Congenital spine pathologies   [x]Prior surgical intervention  []Osteoporosis (M81.8)  []Osteopenia (M85.8)   Endocrine conditions   []Hypothyroid (E03.9)  []Hyperthyroid Gastrointestinal conditions   []Constipation (Z30.74)   Metabolic conditions   []Morbid obesity (E66.01)  [x]Diabetes type 1(E10.65) or 2 (E11.65)   [x]Neuropathy (G60.9)     Pulmonary conditions   []Asthma (J45)  []Coughing   []COPD (J44.9)   Psychological Disorders  [x]Anxiety (F41.9)  [x]Depression (F32.9)   []Other:   [x]Other:  2 stents, kidney disease         Barriers to/and or personal factors that will affect rehab potential:              [x]Age  []Sex              []Motivation/Lack of Motivation                        [x]Co-Morbidities              []Cognitive Function, education/learning barriers              []Environmental, home barriers              []profession/work barriers  [x]past PT/medical experience  []other:  Justification:     Falls Risk Assessment (30 days):   [x] Falls Risk assessed and no intervention required.   [] Falls Risk assessed and Patient requires intervention due to being higher risk   TUG score (>12s at risk):     [] Falls education provided, including       G-Codes:  NELLY 38%    ASSESSMENT:   Functional Impairments:

## 2019-07-22 ENCOUNTER — HOSPITAL ENCOUNTER (OUTPATIENT)
Dept: PHYSICAL THERAPY | Age: 79
Setting detail: THERAPIES SERIES
Discharge: HOME | End: 2019-07-22
Payer: MEDICARE

## 2019-07-22 PROCEDURE — 97140 MANUAL THERAPY 1/> REGIONS: CPT

## 2019-07-22 PROCEDURE — 97110 THERAPEUTIC EXERCISES: CPT

## 2019-07-22 NOTE — FLOWSHEET NOTE
reducing/eliminating soft tissue swelling/inflammation/restriction, improving soft tissue extensibility and allowing for proper ROM for normal function with self care, mobility, lifting and ambulation. Modalities:       Charges:  Timed Code Treatment Minutes: 50   Total Treatment Minutes: 50     [] EVAL (LOW) 99177 (typically 20 minutes face-to-face)  [] EVAL (MOD) 78354 (typically 30 minutes face-to-face)  [] EVAL (HIGH) 97480 (typically 45 minutes face-to-face)  [] RE-EVAL     [x] TK(62628) x  2   [] IONTO  [] NMR (77962) x      [] VASO  [x] Manual (38794) x  1    [] Other:  [] TA x       [] Mech Traction (30065)  [] ES(attended) (15329)      [] ES (un) (14403):     Goals:  Short Term Goals: To be achieved in: 2 weeks  1. Independent in HEP and progression per patient tolerance, in order to prevent re-injury. 2. Patient will have a decrease in pain to facilitate improvement in movement, function, and ADLs as indicated by Functional Deficits.     Long Term Goals: To be achieved in: 6 weeks  1. Disability index score of 10% or less for the NELLY to assist with reaching prior level of function. 2. Patient will demonstrate increased AROM to WNL, good LS mobility, good hip ROM to allow for proper joint functioning as indicated by patients Functional Deficits. 3. Patient will demonstrate an increase in Strength to good proximal hip and core activation to allow for proper functional mobility as indicated by patients Functional Deficits. 4. Patient will return to prior functional activities without increased symptoms or restriction. 5. Pt will report ability to sleep throughout the night pain free and cook meal painfree. Progression Towards Functional goals:  [x] Patient is progressing as expected towards functional goals listed. [] Progression is slowed due to complexities listed. [] Progression has been slowed due to co-morbidities.   [] Plan just implemented, too soon to assess goals progression  [] Other: ASSESSMENT:  Pt tolerated first follow up well. Cuing required throughout session for TA activation. Poor motor planning observed, but receptive to verbal and tactile cuing. Pt tolerated hip mobilizations with belt well today. Continued core, hip and glut strengthening needed at this time.      Treatment/Activity Tolerance:  [x] Patient tolerated treatment well [] Patient limited by fatique  [] Patient limited by pain  [] Patient limited by other medical complications  [] Other:     Prognosis: [x] Good [] Fair  [] Poor    Patient Requires Follow-up: [x] Yes  [] No    PLAN:  [x] Continue per plan of care [] Alter current plan (see comments)  [] Plan of care initiated [] Hold pending MD visit [] Discharge    Electronically signed by: Margaret Bennett PT

## 2019-07-24 ENCOUNTER — HOSPITAL ENCOUNTER (OUTPATIENT)
Dept: PHYSICAL THERAPY | Age: 79
Setting detail: THERAPIES SERIES
Discharge: HOME | End: 2019-07-24
Payer: MEDICARE

## 2019-07-24 ENCOUNTER — TELEPHONE (OUTPATIENT)
Dept: ORTHOPEDIC SURGERY | Age: 79
End: 2019-07-24

## 2019-07-24 PROCEDURE — 97140 MANUAL THERAPY 1/> REGIONS: CPT

## 2019-07-24 PROCEDURE — 97110 THERAPEUTIC EXERCISES: CPT

## 2019-07-26 ENCOUNTER — TELEPHONE (OUTPATIENT)
Dept: ENT CLINIC | Age: 79
End: 2019-07-26

## 2019-07-26 DIAGNOSIS — H92.10 OTORRHEA, UNSPECIFIED LATERALITY: Primary | ICD-10-CM

## 2019-07-26 RX ORDER — CLINDAMYCIN HYDROCHLORIDE 300 MG/1
300 CAPSULE ORAL 3 TIMES DAILY
Qty: 21 CAPSULE | Refills: 0 | OUTPATIENT
Start: 2019-07-26 | End: 2019-07-29

## 2019-07-26 RX ORDER — HYDROCORTISONE AND ACETIC ACID 20.75; 10.375 MG/ML; MG/ML
SOLUTION AURICULAR (OTIC)
Qty: 15 ML | Refills: 1 | OUTPATIENT
Start: 2019-07-26 | End: 2019-07-29

## 2019-07-29 ENCOUNTER — OFFICE VISIT (OUTPATIENT)
Dept: ENT CLINIC | Age: 79
End: 2019-07-29
Payer: MEDICARE

## 2019-07-29 ENCOUNTER — APPOINTMENT (OUTPATIENT)
Dept: PHYSICAL THERAPY | Age: 79
End: 2019-07-29
Payer: MEDICARE

## 2019-07-29 VITALS — SYSTOLIC BLOOD PRESSURE: 132 MMHG | OXYGEN SATURATION: 96 % | DIASTOLIC BLOOD PRESSURE: 72 MMHG | HEART RATE: 64 BPM

## 2019-07-29 DIAGNOSIS — J32.9 RECURRENT SINUSITIS: ICD-10-CM

## 2019-07-29 DIAGNOSIS — H92.11 OTORRHEA OF RIGHT EAR: ICD-10-CM

## 2019-07-29 DIAGNOSIS — H92.10 OTORRHEA, UNSPECIFIED LATERALITY: Primary | ICD-10-CM

## 2019-07-29 PROCEDURE — G8598 ASA/ANTIPLAT THER USED: HCPCS | Performed by: OTOLARYNGOLOGY

## 2019-07-29 PROCEDURE — 99213 OFFICE O/P EST LOW 20 MIN: CPT | Performed by: OTOLARYNGOLOGY

## 2019-07-29 PROCEDURE — 1123F ACP DISCUSS/DSCN MKR DOCD: CPT | Performed by: OTOLARYNGOLOGY

## 2019-07-29 PROCEDURE — G8400 PT W/DXA NO RESULTS DOC: HCPCS | Performed by: OTOLARYNGOLOGY

## 2019-07-29 PROCEDURE — 4004F PT TOBACCO SCREEN RCVD TLK: CPT | Performed by: OTOLARYNGOLOGY

## 2019-07-29 PROCEDURE — G8427 DOCREV CUR MEDS BY ELIG CLIN: HCPCS | Performed by: OTOLARYNGOLOGY

## 2019-07-29 PROCEDURE — 4040F PNEUMOC VAC/ADMIN/RCVD: CPT | Performed by: OTOLARYNGOLOGY

## 2019-07-29 PROCEDURE — G8417 CALC BMI ABV UP PARAM F/U: HCPCS | Performed by: OTOLARYNGOLOGY

## 2019-07-29 PROCEDURE — 1090F PRES/ABSN URINE INCON ASSESS: CPT | Performed by: OTOLARYNGOLOGY

## 2019-07-29 RX ORDER — CLINDAMYCIN HYDROCHLORIDE 300 MG/1
300 CAPSULE ORAL 3 TIMES DAILY
Qty: 30 CAPSULE | Refills: 0 | Status: SHIPPED | OUTPATIENT
Start: 2019-07-29 | End: 2019-08-08

## 2019-07-29 RX ORDER — HYDROCORTISONE 10 MG/1
10 TABLET ORAL DAILY
COMMUNITY
End: 2019-09-16 | Stop reason: ALTCHOICE

## 2019-07-31 ENCOUNTER — TELEPHONE (OUTPATIENT)
Dept: ORTHOPEDIC SURGERY | Age: 79
End: 2019-07-31

## 2019-07-31 ENCOUNTER — APPOINTMENT (OUTPATIENT)
Dept: PHYSICAL THERAPY | Age: 79
End: 2019-07-31
Payer: MEDICARE

## 2019-08-01 ENCOUNTER — TELEPHONE (OUTPATIENT)
Dept: OTOLARYNGOLOGY | Age: 79
End: 2019-08-01

## 2019-08-01 DIAGNOSIS — H92.11 OTORRHEA OF RIGHT EAR: Primary | ICD-10-CM

## 2019-08-01 LAB
CULTURE EAR OR EYE: ABNORMAL
GRAM STAIN RESULT: ABNORMAL
ORGANISM: ABNORMAL
ORGANISM: ABNORMAL

## 2019-08-01 RX ORDER — CLARITHROMYCIN 500 MG/1
500 TABLET, COATED ORAL 2 TIMES DAILY
Qty: 20 TABLET | Refills: 0 | Status: SHIPPED | OUTPATIENT
Start: 2019-08-01 | End: 2019-08-11

## 2019-08-01 NOTE — TELEPHONE ENCOUNTER
Culture demonstrates staph aureus which is sensitive to erythromycin which is apparently the only other medication that she can take without an allergy. We will start her on Biaxin along with her eardrops and see her again in approximately 10 days.

## 2019-08-09 ENCOUNTER — OFFICE VISIT (OUTPATIENT)
Dept: ENT CLINIC | Age: 79
End: 2019-08-09
Payer: MEDICARE

## 2019-08-09 VITALS — OXYGEN SATURATION: 98 % | SYSTOLIC BLOOD PRESSURE: 152 MMHG | HEART RATE: 59 BPM | DIASTOLIC BLOOD PRESSURE: 80 MMHG

## 2019-08-09 DIAGNOSIS — H92.11 OTORRHEA OF RIGHT EAR: Primary | ICD-10-CM

## 2019-08-09 DIAGNOSIS — H60.8X1 CHRONIC ECZEMATOUS OTITIS EXTERNA OF RIGHT EAR: ICD-10-CM

## 2019-08-09 PROCEDURE — G8400 PT W/DXA NO RESULTS DOC: HCPCS | Performed by: OTOLARYNGOLOGY

## 2019-08-09 PROCEDURE — 1123F ACP DISCUSS/DSCN MKR DOCD: CPT | Performed by: OTOLARYNGOLOGY

## 2019-08-09 PROCEDURE — 99213 OFFICE O/P EST LOW 20 MIN: CPT | Performed by: OTOLARYNGOLOGY

## 2019-08-09 PROCEDURE — G8598 ASA/ANTIPLAT THER USED: HCPCS | Performed by: OTOLARYNGOLOGY

## 2019-08-09 PROCEDURE — G8427 DOCREV CUR MEDS BY ELIG CLIN: HCPCS | Performed by: OTOLARYNGOLOGY

## 2019-08-09 PROCEDURE — 4040F PNEUMOC VAC/ADMIN/RCVD: CPT | Performed by: OTOLARYNGOLOGY

## 2019-08-09 PROCEDURE — 1090F PRES/ABSN URINE INCON ASSESS: CPT | Performed by: OTOLARYNGOLOGY

## 2019-08-09 PROCEDURE — G8417 CALC BMI ABV UP PARAM F/U: HCPCS | Performed by: OTOLARYNGOLOGY

## 2019-08-09 PROCEDURE — 4004F PT TOBACCO SCREEN RCVD TLK: CPT | Performed by: OTOLARYNGOLOGY

## 2019-08-09 NOTE — PROGRESS NOTES
Patient is anxious to have her injection for her back problem and pursue potential hip replacement surgery. As a result, she is concerned relative to infection being present in her right ear. At this time, she does not notice any particular drainage or discomfort in her right ear. She is completed her course of therapy but is still taking her Biaxin. Currently, she appears in no distress. Ear examination on the left is entirely normal.  On the right, there is debris present which is suctioned completely from the ear canal.  At this point, I do not see any drainage consistent with any infection. The oral examination remains unremarkable. The nasal mucosa appears normal as well. The neck continues to demonstrate no evidence of adenopathy, mass, thyroid enlargement. I reassured her that I do not feel that the infection is present at this time. I have painted the canal with gentian violet in the hopes of eliminating potential in the future. I have suggested that she can wear her hearing aid but that I would like her to use her current drops with 2 drops placed in the right ear at bedtime only. She will complete her course of action and I have told her that she can go ahead and pursue any further medical therapy. Would include injections.

## 2019-08-14 ENCOUNTER — APPOINTMENT (OUTPATIENT)
Dept: GENERAL RADIOLOGY | Age: 79
End: 2019-08-14
Attending: PHYSICAL MEDICINE & REHABILITATION
Payer: MEDICARE

## 2019-08-14 ENCOUNTER — HOSPITAL ENCOUNTER (OUTPATIENT)
Age: 79
Setting detail: OUTPATIENT SURGERY
Discharge: HOME | End: 2019-08-14
Attending: PHYSICAL MEDICINE & REHABILITATION | Admitting: PHYSICAL MEDICINE & REHABILITATION
Payer: MEDICARE

## 2019-08-14 VITALS
DIASTOLIC BLOOD PRESSURE: 82 MMHG | OXYGEN SATURATION: 99 % | WEIGHT: 177 LBS | BODY MASS INDEX: 27.78 KG/M2 | RESPIRATION RATE: 18 BRPM | SYSTOLIC BLOOD PRESSURE: 144 MMHG | HEIGHT: 67 IN | HEART RATE: 54 BPM | TEMPERATURE: 97.5 F

## 2019-08-14 LAB
GLUCOSE BLD-MCNC: 212 MG/DL (ref 70–99)
GLUCOSE BLD-MCNC: 226 MG/DL (ref 70–99)
PERFORMED ON: ABNORMAL
PERFORMED ON: ABNORMAL

## 2019-08-14 PROCEDURE — 3610000054 HC PAIN LEVEL 3 BASE (NON-OR): Performed by: PHYSICAL MEDICINE & REHABILITATION

## 2019-08-14 PROCEDURE — 2709999900 HC NON-CHARGEABLE SUPPLY: Performed by: PHYSICAL MEDICINE & REHABILITATION

## 2019-08-14 PROCEDURE — 2580000003 HC RX 258: Performed by: PHYSICAL MEDICINE & REHABILITATION

## 2019-08-14 PROCEDURE — 2500000003 HC RX 250 WO HCPCS: Performed by: PHYSICAL MEDICINE & REHABILITATION

## 2019-08-14 PROCEDURE — 6370000000 HC RX 637 (ALT 250 FOR IP): Performed by: PHYSICAL MEDICINE & REHABILITATION

## 2019-08-14 PROCEDURE — 99152 MOD SED SAME PHYS/QHP 5/>YRS: CPT | Performed by: PHYSICAL MEDICINE & REHABILITATION

## 2019-08-14 PROCEDURE — 77003 FLUOROGUIDE FOR SPINE INJECT: CPT

## 2019-08-14 PROCEDURE — 6360000002 HC RX W HCPCS: Performed by: PHYSICAL MEDICINE & REHABILITATION

## 2019-08-14 RX ORDER — MIDAZOLAM HYDROCHLORIDE 1 MG/ML
INJECTION INTRAMUSCULAR; INTRAVENOUS
Status: COMPLETED | OUTPATIENT
Start: 2019-08-14 | End: 2019-08-14

## 2019-08-14 RX ORDER — METHYLPREDNISOLONE ACETATE 80 MG/ML
INJECTION, SUSPENSION INTRA-ARTICULAR; INTRALESIONAL; INTRAMUSCULAR; SOFT TISSUE
Status: COMPLETED | OUTPATIENT
Start: 2019-08-14 | End: 2019-08-14

## 2019-08-14 RX ORDER — LIDOCAINE HYDROCHLORIDE 10 MG/ML
INJECTION, SOLUTION INFILTRATION; PERINEURAL
Status: COMPLETED | OUTPATIENT
Start: 2019-08-14 | End: 2019-08-14

## 2019-08-14 RX ORDER — 0.9 % SODIUM CHLORIDE 0.9 %
VIAL (ML) INJECTION
Status: COMPLETED | OUTPATIENT
Start: 2019-08-14 | End: 2019-08-14

## 2019-08-14 RX ADMIN — INSULIN HUMAN 2 UNITS: 100 INJECTION, SOLUTION PARENTERAL at 08:08

## 2019-08-14 ASSESSMENT — PAIN SCALES - GENERAL: PAINLEVEL_OUTOF10: 2

## 2019-08-14 ASSESSMENT — PAIN DESCRIPTION - DESCRIPTORS: DESCRIPTORS: ACHING

## 2019-08-14 ASSESSMENT — PAIN - FUNCTIONAL ASSESSMENT: PAIN_FUNCTIONAL_ASSESSMENT: 0-10

## 2019-08-14 NOTE — OP NOTE
Patient:  Yulia Romero  YOB: 1940  Medical Record #:  [id number]   Place:   [address] [place] [address]  Date:  8/14/2019   Physician:  Charmaine Silva MD, ANGELO    Procedure:  Lumbar Epidural Steroid Injection - Interlaminar approach  L4 - L5    Pre-Procedure Diagnosis: Lumbar radiculopathy    Post-Procedure Diagnosis: Same    Sedation: Local with 1% Lidocaine 3 ml and 2 mg of IV Versed    EBL: None    Complications: None    Procedure Summary:    The patient was brought to the procedure suite and placed in the prone position. The skin overlying the lumbar spine was prepped and draped in the usual sterile fashion. Using fluoroscopic guidance, the L4 - L5 interlaminar space was identified. Through anesthetized skin a 20 gauge Touhy needle was advanced into the epidural space using continuous loss of resistance to saline technique. Isovue M300 was instilled and an epidurogram was noted without evidence of intrathecal or vascular spread. 10 ml of a solution containing preservative free normal saline and 80 mg of Depomedrol was instilled. The needle was removed and a band-aid applied. The patient was transferred to the post-operative area in stable condition.

## 2019-08-14 NOTE — H&P
HISTORY AND PHYSICAL/PRE-SEDATION ASSESSMENT    Patient:  Kwaku Barlow   :  1940  Medical Record No.:  [id number]   Date:  2019  Physician:  Elieser So M.D. Facility: [address] [place] [address]    HISTORY OF PRESENT ILLNESS:                 The patient is a 66 y.o. female whom presents with lower back pain. Review of the imaging and physical exam of the patient confirmed the pre-procedure diagnosis. After a thorough discussion of risks, benefits and alternatives informed consent was obtained. Past Medical History:   Past Medical History:   Diagnosis Date    Arthritis     CAD (coronary artery disease)     Diabetes mellitus ([facility] [address]     Hyperlipidemia     Hypertension     PONV (postoperative nausea and vomiting)       Past Surgical History:     Past Surgical History:   Procedure Laterality Date    COLONOSCOPY      CORONARY ANGIOPLASTY WITH STENT PLACEMENT      EPIDURAL STEROID INJECTION Bilateral 2019    BILATERAL L4 TRANSFORAMINAL EPIDURAL STEROID INJECTION WITH FLUOROSCOPY performed by Elieser So MD at [facility] N/A 3/27/2019    L4/L5 INTERLAMINAR EPIDURAL STEROID INJECTION WITH FLUOROSCOPY performed by Elieser So MD at [address] [place] [address]       Current Medications:   Prior to Admission medications    Medication Sig Start Date End Date Taking? Authorizing Provider   PAZEO 0.7 % SOLN INSTILL 1 DROP INTO EACH EYE ONCE DAILY 19  Yes Historical Provider, MD   nebivolol (BYSTOLIC) 5 MG tablet Take 5 mg by mouth daily 2.5   Yes Historical Provider, MD   alprazolam (XANAX) 0.5 MG tablet Take 0.5 mg by mouth 3 times daily as needed. 8/19/10  Yes Historical Provider, MD   aspirin 81 MG EC tablet Take 81 mg by mouth daily. Yes Historical Provider, MD   clopidogrel (PLAVIX) 75 MG tablet Take 75 mg by mouth daily.    Yes Historical Provider, MD   hydrocortisone (CORTEF) 10 MG tablet Take 10 mg by mouth daily    Historical

## 2019-08-14 NOTE — PROGRESS NOTES
IV discontinued, catheter intact, and dressing applied. Procedural dressing dry and intact. Bilateral lower extremities equal in strength. Discharge instructions reviewed with patient or responsible adult, signed and copy given. All home medications have been reviewed. All questions answered and patient or responsible adult verbalized understanding.   PAIN LEVEL AT DISCHARGE ___2__

## 2019-08-16 ENCOUNTER — TELEPHONE (OUTPATIENT)
Dept: ORTHOPEDIC SURGERY | Age: 79
End: 2019-08-16

## 2019-08-19 NOTE — TELEPHONE ENCOUNTER
This information would best come from the PCP, she should wait for a call back from their office since they are the ones that manage the diabetes. Thank you!

## 2019-08-23 ENCOUNTER — OFFICE VISIT (OUTPATIENT)
Dept: ORTHOPEDIC SURGERY | Age: 79
End: 2019-08-23
Payer: MEDICARE

## 2019-08-23 VITALS
DIASTOLIC BLOOD PRESSURE: 60 MMHG | WEIGHT: 177.03 LBS | BODY MASS INDEX: 27.79 KG/M2 | HEART RATE: 62 BPM | SYSTOLIC BLOOD PRESSURE: 136 MMHG | HEIGHT: 67 IN

## 2019-08-23 DIAGNOSIS — H66.90 EAR INFECTION: ICD-10-CM

## 2019-08-23 DIAGNOSIS — M48.062 LUMBAR STENOSIS WITH NEUROGENIC CLAUDICATION: Primary | ICD-10-CM

## 2019-08-23 DIAGNOSIS — M51.26 HNP (HERNIATED NUCLEUS PULPOSUS), LUMBAR: ICD-10-CM

## 2019-08-23 DIAGNOSIS — M54.16 LUMBAR RADICULOPATHY: ICD-10-CM

## 2019-08-23 PROCEDURE — G8598 ASA/ANTIPLAT THER USED: HCPCS | Performed by: PHYSICAL MEDICINE & REHABILITATION

## 2019-08-23 PROCEDURE — 1090F PRES/ABSN URINE INCON ASSESS: CPT | Performed by: PHYSICAL MEDICINE & REHABILITATION

## 2019-08-23 PROCEDURE — 99213 OFFICE O/P EST LOW 20 MIN: CPT | Performed by: PHYSICAL MEDICINE & REHABILITATION

## 2019-08-23 PROCEDURE — 4040F PNEUMOC VAC/ADMIN/RCVD: CPT | Performed by: PHYSICAL MEDICINE & REHABILITATION

## 2019-08-23 PROCEDURE — G8417 CALC BMI ABV UP PARAM F/U: HCPCS | Performed by: PHYSICAL MEDICINE & REHABILITATION

## 2019-08-23 PROCEDURE — G8427 DOCREV CUR MEDS BY ELIG CLIN: HCPCS | Performed by: PHYSICAL MEDICINE & REHABILITATION

## 2019-08-23 PROCEDURE — G8400 PT W/DXA NO RESULTS DOC: HCPCS | Performed by: PHYSICAL MEDICINE & REHABILITATION

## 2019-08-23 PROCEDURE — 1123F ACP DISCUSS/DSCN MKR DOCD: CPT | Performed by: PHYSICAL MEDICINE & REHABILITATION

## 2019-08-23 PROCEDURE — 4004F PT TOBACCO SCREEN RCVD TLK: CPT | Performed by: PHYSICAL MEDICINE & REHABILITATION

## 2019-08-23 NOTE — PROGRESS NOTES
Laterality Date    COLONOSCOPY      CORONARY ANGIOPLASTY WITH STENT PLACEMENT      EPIDURAL STEROID INJECTION Bilateral 2/27/2019    BILATERAL L4 TRANSFORAMINAL EPIDURAL STEROID INJECTION WITH FLUOROSCOPY performed by Cristian Shaikh MD at [facility] N/A 3/27/2019    L4/L5 INTERLAMINAR EPIDURAL STEROID INJECTION WITH FLUOROSCOPY performed by Cristian Shaikh MD at [address] [place] [address] N/A 8/14/2019    MIDLINE L4L5 INTERLAMINAR EPIDURAL STEROID INJECTION WITH FLUOROSCOPY performed by Cristian [facility]ikh MD at [facility]     Current Medications:     Current Outpatient Medications:     hydrocortisone (CORTEF) 10 MG tablet, Take 10 mg by mouth daily, Disp: , Rfl:     PAZEO 0.7 % SOLN, INSTILL 1 DROP INTO EACH EYE ONCE DAILY, Disp: , Rfl: 1    nebivolol (BYSTOLIC) 5 MG tablet, Take 5 mg by mouth daily 2.5, Disp: , Rfl:     alprazolam (XANAX) 0.5 MG tablet, Take 0.5 mg by mouth 3 times daily as needed. , Disp: , Rfl:     aspirin 81 MG EC tablet, Take 81 mg by mouth daily. , Disp: , Rfl:     clopidogrel (PLAVIX) 75 MG tablet, Take 75 mg by mouth daily. , Disp: , Rfl:   Allergies:  Cephalexin; Doxycycline; Levofloxacin; Levofloxacin; Sulfa antibiotics; Ezetimibe; Misoprostol; Naproxen; Pioglitazone; Simvastatin; Trazodone; Trazodone hcl; Actos [pioglitazone hydrochloride]; Bactrim; Cephalexin; Ezetimibe-simvastatin; Lisinopril; and Trazodone and nefazodone  Social History:    reports that she has been smoking e-cigarettes. She has been smoking about 0.25 packs per day. She has never used smokeless tobacco. She reports that she does not drink alcohol or use drugs.   Family History:   Family History   Problem Relation Age of Onset    Diabetes Sister        REVIEW OF SYSTEMS:   CONSTITUTIONAL: Denies unexplained weight loss, fevers, chills or fatigue  NEUROLOGICAL: Denies unsteady gait or progressive weakness  MUSCULOSKELETAL: Denies joint swelling or redness  GI: Denies all planes due to pain  · Strength:   Strength testing is 5/5 in all muscle groups tested. · Special Tests:   Straight leg raise and crossed SLR negative. Troy's testing is negative bilaterally. FADIR's testing is negative bilaterally. · Skin: There are no rashes, ulcerations or lesions. · Reflexes: Reflexes are symmetrically 2+ at the patellar and ankle tendons. Clonus absent bilaterally at the feet. · Gait & station: normal, patient ambulates without assistance and no ataxia  · Additional Examinations:  · RIGHT LOWER EXTREMITY: Inspection/examination of the right lower extremity does not show any tenderness, deformity or injury. Range of motion is normal and pain-free. There is no gross instability. There are no rashes, ulcerations or lesions. Strength and tone are normal. No atrophy or abnormal movements are noted. · LEFT LOWER EXTREMITY:  Inspection/examination of the left lower extremity does not show any tenderness, deformity or injury. Range of motion is normal and pain-free. There is no gross instability. There are no rashes, ulcerations or lesions. Strength and tone are normal. No atrophy or abnormal movements are noted. Diagnostic Testing:    No new diagnostics  Results for orders placed or performed during the hospital encounter of 08/14/19   POCT Glucose   Result Value Ref Range    POC Glucose 226 (H) 70 - 99 mg/dl    Performed on ACCU-CHEK    POCT Glucose   Result Value Ref Range    POC Glucose 212 (H) 70 - 99 mg/dl    Performed on ACCU-CHEK      Impression:       1. Lumbar stenosis with neurogenic claudication    2. HNP (herniated nucleus pulposus), lumbar    3. Lumbar radiculopathy    4. Ear infection        Plan:  Clinical Course: Above diagnoses are improving     I discussed the diagnosis and the treatment options with Paulo Sheikh today.      In Summary:  The various treatment options were outlined and discussed with Paulo Sheikh including:  Conservative care options: physical therapy, ice, medications, bracing, and activity modification. The indications for therapeutic injections. The indications for additional imaging/laboratory studies. The indications for (possible future) interventions. After considering the various options discussed, Paulo Sheikh elected to pursue a course of treatment that includes the followin. Medications:  No further recommendations for new medications. 2. PT:  Encouraged to continue with HEP. 3. Further studies:  Referral to ENT for ear infection    4. Interventional:  75% to 80% pain relief following lumbar epidural steroid injection    5. Follow up:  4-6 weeks      Paulo Sheikh was instructed to call the office if her symptoms worsen or if new symptoms appear prior to the next scheduled visit. She is specifically instructed to contact the office between now & her scheduled appointment if she has concerns related to her condition or if she needs assistance in scheduling the above tests. She is welcome to call for an appointment sooner if she has any additional concerns or questions. Amina KHAN ATC, am scribing for and in the presence of Dr. Glen Mckenzie. 19 11:51 AM Amina Payne. The physical examination was performed between the patient and Dr. Glen Mckenzie. All counseling during the appointment was performed between the patient and the provider. IDr. Glen, personally performed the services described in this documentation as scribed by Amina Barboza ATC in my presence and it is both accurate and complete. Cathleen Bustillos. Wallace Lloyd MD, ANGELO, [address] [place] [address]  Board Certified in [address] [place] [address]  Certified and Fellowship Trained in [facility])             This dictation was performed with a verbal recognition program [facility]) and it was checked for errors.  It is

## 2019-09-16 ENCOUNTER — OFFICE VISIT (OUTPATIENT)
Dept: CARDIOLOGY CLINIC | Age: 79
End: 2019-09-16
Payer: MEDICARE

## 2019-09-16 VITALS
BODY MASS INDEX: 28.09 KG/M2 | HEART RATE: 60 BPM | HEIGHT: 67 IN | DIASTOLIC BLOOD PRESSURE: 60 MMHG | SYSTOLIC BLOOD PRESSURE: 130 MMHG | WEIGHT: 179 LBS

## 2019-09-16 DIAGNOSIS — E78.49 OTHER HYPERLIPIDEMIA: ICD-10-CM

## 2019-09-16 DIAGNOSIS — I10 ESSENTIAL HYPERTENSION: ICD-10-CM

## 2019-09-16 DIAGNOSIS — I25.10 CORONARY ARTERY DISEASE INVOLVING NATIVE CORONARY ARTERY OF NATIVE HEART WITHOUT ANGINA PECTORIS: Primary | ICD-10-CM

## 2019-09-16 PROCEDURE — G8417 CALC BMI ABV UP PARAM F/U: HCPCS | Performed by: INTERNAL MEDICINE

## 2019-09-16 PROCEDURE — G8598 ASA/ANTIPLAT THER USED: HCPCS | Performed by: INTERNAL MEDICINE

## 2019-09-16 PROCEDURE — G8400 PT W/DXA NO RESULTS DOC: HCPCS | Performed by: INTERNAL MEDICINE

## 2019-09-16 PROCEDURE — 4004F PT TOBACCO SCREEN RCVD TLK: CPT | Performed by: INTERNAL MEDICINE

## 2019-09-16 PROCEDURE — 1090F PRES/ABSN URINE INCON ASSESS: CPT | Performed by: INTERNAL MEDICINE

## 2019-09-16 PROCEDURE — 99214 OFFICE O/P EST MOD 30 MIN: CPT | Performed by: INTERNAL MEDICINE

## 2019-09-16 PROCEDURE — 1123F ACP DISCUSS/DSCN MKR DOCD: CPT | Performed by: INTERNAL MEDICINE

## 2019-09-16 PROCEDURE — 4040F PNEUMOC VAC/ADMIN/RCVD: CPT | Performed by: INTERNAL MEDICINE

## 2019-09-16 PROCEDURE — G8427 DOCREV CUR MEDS BY ELIG CLIN: HCPCS | Performed by: INTERNAL MEDICINE

## 2019-09-16 RX ORDER — SODIUM BICARBONATE 650 MG/1
650 TABLET ORAL 2 TIMES DAILY
COMMUNITY
End: 2019-10-02

## 2019-09-16 RX ORDER — VENLAFAXINE HYDROCHLORIDE 150 MG/1
150 CAPSULE, EXTENDED RELEASE ORAL NIGHTLY
COMMUNITY

## 2019-09-16 RX ORDER — GLIPIZIDE 2.5 MG/1
2.5 TABLET, EXTENDED RELEASE ORAL DAILY
COMMUNITY
End: 2019-11-22

## 2019-09-16 RX ORDER — COLESEVELAM 180 1/1
1875 TABLET ORAL 2 TIMES DAILY WITH MEALS
COMMUNITY
End: 2019-09-16

## 2019-09-16 RX ORDER — ATORVASTATIN CALCIUM 80 MG/1
80 TABLET, FILM COATED ORAL NIGHTLY
COMMUNITY

## 2019-09-16 NOTE — PROGRESS NOTES
Lipitor 80mg. Labs 4/9/19:  ; TRIG 149; HDL 38; LDL 58   4. Carotid stenosis: Stable  Doppler 5/31/16> 16-49% bilateral  Doppler 12/14> 16-49% bilateral  5. Tobacco abuse: patient states she quit May, 2012  6. DM - A1c is 6.9  Following with Dr Mar Kauffman: Will stop Welchol and repeat lipids in 2 months. Regular exercise and limiting sweets discussed at length. FU 6 months. Scribe's attestation: This note was scribed in the presence of Dr Brayan Patel MD by Riley Pinon, RADHA. The scribe's documentation has been prepared under my direction and personally reviewed by me in its entirety. I confirm that the note above accurately reflects all work, treatment, procedures, and medical decision making performed by me.

## 2019-09-18 ENCOUNTER — OFFICE VISIT (OUTPATIENT)
Dept: ORTHOPEDIC SURGERY | Age: 79
End: 2019-09-18
Payer: MEDICARE

## 2019-09-18 VITALS
HEIGHT: 67 IN | SYSTOLIC BLOOD PRESSURE: 138 MMHG | WEIGHT: 177 LBS | DIASTOLIC BLOOD PRESSURE: 68 MMHG | HEART RATE: 63 BPM | BODY MASS INDEX: 27.78 KG/M2

## 2019-09-18 DIAGNOSIS — M25.552 PAIN OF LEFT HIP JOINT: ICD-10-CM

## 2019-09-18 DIAGNOSIS — M16.12 PRIMARY OSTEOARTHRITIS OF LEFT HIP: Primary | ICD-10-CM

## 2019-09-18 PROCEDURE — G8598 ASA/ANTIPLAT THER USED: HCPCS | Performed by: ORTHOPAEDIC SURGERY

## 2019-09-18 PROCEDURE — G8417 CALC BMI ABV UP PARAM F/U: HCPCS | Performed by: ORTHOPAEDIC SURGERY

## 2019-09-18 PROCEDURE — 4040F PNEUMOC VAC/ADMIN/RCVD: CPT | Performed by: ORTHOPAEDIC SURGERY

## 2019-09-18 PROCEDURE — G8427 DOCREV CUR MEDS BY ELIG CLIN: HCPCS | Performed by: ORTHOPAEDIC SURGERY

## 2019-09-18 PROCEDURE — 4004F PT TOBACCO SCREEN RCVD TLK: CPT | Performed by: ORTHOPAEDIC SURGERY

## 2019-09-18 PROCEDURE — 1123F ACP DISCUSS/DSCN MKR DOCD: CPT | Performed by: ORTHOPAEDIC SURGERY

## 2019-09-18 PROCEDURE — G8400 PT W/DXA NO RESULTS DOC: HCPCS | Performed by: ORTHOPAEDIC SURGERY

## 2019-09-18 PROCEDURE — 99213 OFFICE O/P EST LOW 20 MIN: CPT | Performed by: ORTHOPAEDIC SURGERY

## 2019-09-18 PROCEDURE — 1090F PRES/ABSN URINE INCON ASSESS: CPT | Performed by: ORTHOPAEDIC SURGERY

## 2019-09-18 NOTE — PROGRESS NOTES
[facility]  History and Physical  HIP PAIN    Date:  2019    Name:  Samantha Linares  Address:  [address] [place] [address]  Lamberto Menezes [id number]    :  1940      Age:   78 y.o.    SSN:  [id number]      Medical Record Number:  [id number]    Reason for Visit:    Hip Pain (OP/NP left hip pain, last seen on 19-wants to talk about surgery for total)      History of Present Illness:  Samantha Linares is a 78 y.o. female presents for left hip pain. She is here today in office with her  and daughter. She is seen Dr. Glenis Diamond for her lumbar stenosis and states that has been relatively well controlled with after steroid injections. They present today to be set up for left hip replacement. Patient said that quality of life unable to normal activities secondary to the continued and left anterior groin pain. Additionally she cannot take anti-inflammatory secondary to Plavix use from previous stents in the heart. Review of Systems:  A 14 point review of systems available in the scanned medical record as documented by the patient.   The review is negative with the exception of those things mentioned in the HPI ane PMH    Past History:  Past Medical History:   Diagnosis Date    Arthritis     CAD (coronary artery disease)     Diabetes mellitus ([facility] [address]     Hyperlipidemia     Hypertension     PONV (postoperative nausea and vomiting)      Past Surgical History:   Procedure Laterality Date    COLONOSCOPY      CORONARY ANGIOPLASTY WITH STENT PLACEMENT      EPIDURAL STEROID INJECTION Bilateral 2019    BILATERAL L4 TRANSFORAMINAL EPIDURAL STEROID INJECTION WITH FLUOROSCOPY performed by Nick Garcia MD at [facility] N/A 3/27/2019    L4/L5 INTERLAMINAR EPIDURAL STEROID INJECTION WITH FLUOROSCOPY performed by Nick Garcia MD at [address] [place] [address] N/A 2019    MIDLINE L4L5 INTERLAMINAR EPIDURAL status: Not on file    Intimate partner violence:     Fear of current or ex partner: Not on file     Emotionally abused: Not on file     Physically abused: Not on file     Forced sexual activity: Not on file   Other Topics Concern    Not on file   Social History Narrative    Not on file     Family History   Problem Relation Age of Onset    Diabetes Sister        Current Medications:    Current Outpatient Medications   Medication Sig Dispense Refill    glipiZIDE (GLUCOTROL XL) 2.5 MG extended release tablet Take 2.5 mg by mouth daily      atorvastatin (LIPITOR) 80 MG tablet Take 80 mg by mouth daily      venlafaxine (EFFEXOR XR) 150 MG extended release capsule Take 150 mg by mouth daily      sodium bicarbonate 650 MG tablet Take 650 mg by mouth 2 times daily      nebivolol (BYSTOLIC) 5 MG tablet Take 5 mg by mouth daily 2.5      alprazolam (XANAX) 0.5 MG tablet Take 0.5 mg by mouth 3 times daily as needed.  aspirin 81 MG EC tablet Take 81 mg by mouth daily.  clopidogrel (PLAVIX) 75 MG tablet Take 75 mg by mouth daily. No current facility-administered medications for this visit. Allergies:   Allergies   Allergen Reactions    Cephalexin Other (See Comments)     Other reaction(s): Skin Rash    Doxycycline Nausea And Vomiting and Rash     Nausea and vomiting      Levofloxacin Hives    Levofloxacin Other (See Comments)    Sulfa Antibiotics Other (See Comments) and Nausea Only     Bactrim      Ezetimibe Other (See Comments)    Misoprostol Other (See Comments)     Abdominal pain  Abdominal pain      Naproxen Other (See Comments)     Abdominal pain  Abdominal pain       Pioglitazone Other (See Comments)    Simvastatin Other (See Comments)    Trazodone Other (See Comments)    Trazodone Hcl Other (See Comments)    Actos [Pioglitazone Hydrochloride] Nausea Only    Bactrim Nausea Only    Cephalexin Itching    Ezetimibe-Simvastatin Nausea Only and Other (See Comments)    Lisinopril

## 2019-10-22 PROBLEM — N18.30 CKD (CHRONIC KIDNEY DISEASE), STAGE III (HCC): Status: ACTIVE | Noted: 2019-10-22

## 2019-11-12 ENCOUNTER — TELEPHONE (OUTPATIENT)
Dept: ORTHOPEDIC SURGERY | Age: 79
End: 2019-11-12

## 2019-11-14 ENCOUNTER — HOSPITAL ENCOUNTER (OUTPATIENT)
Dept: CT IMAGING | Age: 79
Discharge: HOME | End: 2019-11-14
Payer: MEDICARE

## 2019-11-14 ENCOUNTER — HOSPITAL ENCOUNTER (OUTPATIENT)
Age: 79
Discharge: HOME | End: 2019-11-14
Payer: MEDICARE

## 2019-11-14 DIAGNOSIS — G44.89 OTHER HEADACHE SYNDROME: ICD-10-CM

## 2019-11-14 LAB
BASOPHILS ABSOLUTE: 0.1 K/UL (ref 0–0.2)
BASOPHILS RELATIVE PERCENT: 0.8 %
EOSINOPHILS ABSOLUTE: 0.1 K/UL (ref 0–0.6)
EOSINOPHILS RELATIVE PERCENT: 1.9 %
HCT VFR BLD CALC: 33.6 % (ref 36–48)
HEMOGLOBIN: 11.2 G/DL (ref 12–16)
LYMPHOCYTES ABSOLUTE: 1.6 K/UL (ref 1–5.1)
LYMPHOCYTES RELATIVE PERCENT: 22.1 %
MCH RBC QN AUTO: 30.3 PG (ref 26–34)
MCHC RBC AUTO-ENTMCNC: 33.5 G/DL (ref 31–36)
MCV RBC AUTO: 90.4 FL (ref 80–100)
MONOCYTES ABSOLUTE: 0.4 K/UL (ref 0–1.3)
MONOCYTES RELATIVE PERCENT: 6 %
NEUTROPHILS ABSOLUTE: 5.1 K/UL (ref 1.7–7.7)
NEUTROPHILS RELATIVE PERCENT: 69.2 %
PDW BLD-RTO: 13.7 % (ref 12.4–15.4)
PLATELET # BLD: 243 K/UL (ref 135–450)
PMV BLD AUTO: 7.1 FL (ref 5–10.5)
RBC # BLD: 3.71 M/UL (ref 4–5.2)
WBC # BLD: 7.4 K/UL (ref 4–11)

## 2019-11-14 PROCEDURE — 36415 COLL VENOUS BLD VENIPUNCTURE: CPT

## 2019-11-14 PROCEDURE — 70450 CT HEAD/BRAIN W/O DYE: CPT

## 2019-11-14 PROCEDURE — 85025 COMPLETE CBC W/AUTO DIFF WBC: CPT

## 2019-11-14 PROCEDURE — 86140 C-REACTIVE PROTEIN: CPT

## 2019-11-14 PROCEDURE — 85652 RBC SED RATE AUTOMATED: CPT

## 2019-11-15 LAB
C-REACTIVE PROTEIN: 0.9 MG/L (ref 0–5.1)
SEDIMENTATION RATE, ERYTHROCYTE: 34 MM/HR (ref 0–30)

## 2019-11-19 ENCOUNTER — TELEPHONE (OUTPATIENT)
Dept: CARDIOLOGY CLINIC | Age: 79
End: 2019-11-19

## 2019-11-20 ENCOUNTER — NURSE ONLY (OUTPATIENT)
Dept: CARDIOLOGY CLINIC | Age: 79
End: 2019-11-20
Payer: MEDICARE

## 2019-11-20 DIAGNOSIS — I25.10 CORONARY ARTERY DISEASE INVOLVING NATIVE CORONARY ARTERY OF NATIVE HEART WITHOUT ANGINA PECTORIS: Primary | ICD-10-CM

## 2019-11-20 PROCEDURE — 93000 ELECTROCARDIOGRAM COMPLETE: CPT | Performed by: INTERNAL MEDICINE

## 2019-11-22 ENCOUNTER — TELEPHONE (OUTPATIENT)
Dept: CARDIOLOGY CLINIC | Age: 79
End: 2019-11-22

## 2019-11-22 ENCOUNTER — OFFICE VISIT (OUTPATIENT)
Dept: ORTHOPEDIC SURGERY | Age: 79
End: 2019-11-22

## 2019-11-22 VITALS
HEIGHT: 67 IN | BODY MASS INDEX: 27.78 KG/M2 | SYSTOLIC BLOOD PRESSURE: 124 MMHG | WEIGHT: 177 LBS | DIASTOLIC BLOOD PRESSURE: 64 MMHG | HEART RATE: 72 BPM

## 2019-11-22 DIAGNOSIS — M16.12 PRIMARY OSTEOARTHRITIS OF LEFT HIP: ICD-10-CM

## 2019-11-22 DIAGNOSIS — M25.552 PAIN OF LEFT HIP JOINT: Primary | ICD-10-CM

## 2019-11-22 PROCEDURE — 99024 POSTOP FOLLOW-UP VISIT: CPT | Performed by: ORTHOPAEDIC SURGERY

## 2019-11-22 RX ORDER — TOBRAMYCIN AND DEXAMETHASONE 3; 1 MG/ML; MG/ML
SUSPENSION/ DROPS OPHTHALMIC PRN
COMMUNITY
Start: 2019-10-02 | End: 2020-01-21

## 2019-11-22 RX ORDER — MONTELUKAST SODIUM 10 MG/1
TABLET ORAL
Refills: 0 | COMMUNITY
Start: 2019-11-04 | End: 2019-11-27 | Stop reason: ALTCHOICE

## 2019-11-26 ENCOUNTER — TELEPHONE (OUTPATIENT)
Dept: ORTHOPEDIC SURGERY | Age: 79
End: 2019-11-26

## 2019-11-27 ENCOUNTER — HOSPITAL ENCOUNTER (OUTPATIENT)
Dept: PREADMISSION TESTING | Age: 79
Discharge: HOME | End: 2019-12-01
Payer: MEDICARE

## 2019-11-27 VITALS
BODY MASS INDEX: 29.98 KG/M2 | TEMPERATURE: 96.8 F | SYSTOLIC BLOOD PRESSURE: 151 MMHG | WEIGHT: 191 LBS | HEART RATE: 61 BPM | DIASTOLIC BLOOD PRESSURE: 60 MMHG | HEIGHT: 67 IN | RESPIRATION RATE: 14 BRPM | OXYGEN SATURATION: 97 %

## 2019-11-27 LAB
ABO/RH: NORMAL
ALBUMIN SERPL-MCNC: 4.3 G/DL (ref 3.4–5)
ALP BLD-CCNC: 68 U/L (ref 40–129)
ALT SERPL-CCNC: 20 U/L (ref 10–40)
AMORPHOUS: ABNORMAL /HPF
ANION GAP SERPL CALCULATED.3IONS-SCNC: 12 MMOL/L (ref 3–16)
ANTIBODY SCREEN: NORMAL
APTT: 35 SEC (ref 24.2–36.2)
AST SERPL-CCNC: 22 U/L (ref 15–37)
BACTERIA: ABNORMAL /HPF
BASOPHILS ABSOLUTE: 0.1 K/UL (ref 0–0.2)
BASOPHILS RELATIVE PERCENT: 0.8 %
BILIRUB SERPL-MCNC: 0.4 MG/DL (ref 0–1)
BILIRUBIN DIRECT: <0.2 MG/DL (ref 0–0.3)
BILIRUBIN URINE: NEGATIVE
BILIRUBIN, INDIRECT: NORMAL MG/DL (ref 0–1)
BLOOD, URINE: NEGATIVE
BUN BLDV-MCNC: 39 MG/DL (ref 7–20)
CALCIUM SERPL-MCNC: 9 MG/DL (ref 8.3–10.6)
CHLORIDE BLD-SCNC: 102 MMOL/L (ref 99–110)
CLARITY: CLEAR
CO2: 24 MMOL/L (ref 21–32)
COLOR: YELLOW
CREAT SERPL-MCNC: 1.7 MG/DL (ref 0.6–1.2)
EOSINOPHILS ABSOLUTE: 0.1 K/UL (ref 0–0.6)
EOSINOPHILS RELATIVE PERCENT: 1.7 %
EPITHELIAL CELLS, UA: ABNORMAL /HPF
GFR AFRICAN AMERICAN: 35
GFR NON-AFRICAN AMERICAN: 29
GLUCOSE BLD-MCNC: 111 MG/DL (ref 70–99)
GLUCOSE URINE: NEGATIVE MG/DL
HCT VFR BLD CALC: 31.8 % (ref 36–48)
HEMOGLOBIN: 10.8 G/DL (ref 12–16)
INR BLD: 1.07 (ref 0.86–1.14)
KETONES, URINE: NEGATIVE MG/DL
LEUKOCYTE ESTERASE, URINE: ABNORMAL
LYMPHOCYTES ABSOLUTE: 1.2 K/UL (ref 1–5.1)
LYMPHOCYTES RELATIVE PERCENT: 16.1 %
MCH RBC QN AUTO: 30.7 PG (ref 26–34)
MCHC RBC AUTO-ENTMCNC: 33.9 G/DL (ref 31–36)
MCV RBC AUTO: 90.4 FL (ref 80–100)
MICROSCOPIC EXAMINATION: YES
MONOCYTES ABSOLUTE: 0.4 K/UL (ref 0–1.3)
MONOCYTES RELATIVE PERCENT: 6 %
NEUTROPHILS ABSOLUTE: 5.5 K/UL (ref 1.7–7.7)
NEUTROPHILS RELATIVE PERCENT: 75.4 %
NITRITE, URINE: NEGATIVE
PDW BLD-RTO: 13.4 % (ref 12.4–15.4)
PH UA: 6.5 (ref 5–8)
PLATELET # BLD: 242 K/UL (ref 135–450)
PMV BLD AUTO: 7.3 FL (ref 5–10.5)
POTASSIUM SERPL-SCNC: 5.1 MMOL/L (ref 3.5–5.1)
PROTEIN UA: ABNORMAL MG/DL
PROTHROMBIN TIME: 12.4 SEC (ref 10–13.2)
RBC # BLD: 3.52 M/UL (ref 4–5.2)
RBC UA: ABNORMAL /HPF (ref 0–2)
SODIUM BLD-SCNC: 138 MMOL/L (ref 136–145)
SPECIFIC GRAVITY UA: 1.01 (ref 1–1.03)
TOTAL PROTEIN: 7.3 G/DL (ref 6.4–8.2)
URINE TYPE: ABNORMAL
UROBILINOGEN, URINE: 0.2 E.U./DL
WBC # BLD: 7.3 K/UL (ref 4–11)
WBC UA: ABNORMAL /HPF (ref 0–5)

## 2019-11-27 PROCEDURE — 80076 HEPATIC FUNCTION PANEL: CPT

## 2019-11-27 PROCEDURE — 85610 PROTHROMBIN TIME: CPT

## 2019-11-27 PROCEDURE — 83036 HEMOGLOBIN GLYCOSYLATED A1C: CPT

## 2019-11-27 PROCEDURE — 81001 URINALYSIS AUTO W/SCOPE: CPT

## 2019-11-27 PROCEDURE — 80048 BASIC METABOLIC PNL TOTAL CA: CPT

## 2019-11-27 PROCEDURE — 86901 BLOOD TYPING SEROLOGIC RH(D): CPT

## 2019-11-27 PROCEDURE — 85025 COMPLETE CBC W/AUTO DIFF WBC: CPT

## 2019-11-27 PROCEDURE — 85730 THROMBOPLASTIN TIME PARTIAL: CPT

## 2019-11-27 PROCEDURE — 87641 MR-STAPH DNA AMP PROBE: CPT

## 2019-11-27 PROCEDURE — 86900 BLOOD TYPING SEROLOGIC ABO: CPT

## 2019-11-27 PROCEDURE — 87077 CULTURE AEROBIC IDENTIFY: CPT

## 2019-11-27 PROCEDURE — 87086 URINE CULTURE/COLONY COUNT: CPT

## 2019-11-27 PROCEDURE — 87186 SC STD MICRODIL/AGAR DIL: CPT

## 2019-11-27 PROCEDURE — 86850 RBC ANTIBODY SCREEN: CPT

## 2019-11-27 RX ORDER — ASCORBIC ACID 500 MG
500 TABLET ORAL DAILY
COMMUNITY
End: 2020-01-21

## 2019-11-28 LAB
ESTIMATED AVERAGE GLUCOSE: 168.6 MG/DL
HBA1C MFR BLD: 7.5 %
MRSA SCREEN RT-PCR: NORMAL

## 2019-11-29 LAB
ORGANISM: ABNORMAL
URINE CULTURE, ROUTINE: ABNORMAL

## 2019-12-02 ENCOUNTER — TELEPHONE (OUTPATIENT)
Dept: ORTHOPEDIC SURGERY | Age: 79
End: 2019-12-02

## 2019-12-20 ENCOUNTER — TELEPHONE (OUTPATIENT)
Dept: ORTHOPEDIC SURGERY | Age: 79
End: 2019-12-20

## 2019-12-20 ENCOUNTER — HOSPITAL ENCOUNTER (OUTPATIENT)
Age: 79
Discharge: HOME | End: 2019-12-20
Payer: MEDICARE

## 2019-12-20 DIAGNOSIS — N39.0 URINARY TRACT INFECTION WITHOUT HEMATURIA, SITE UNSPECIFIED: Primary | ICD-10-CM

## 2019-12-20 DIAGNOSIS — R73.9 ELEVATED BLOOD SUGAR: ICD-10-CM

## 2019-12-20 LAB
BILIRUBIN URINE: NEGATIVE
BLOOD, URINE: NEGATIVE
CLARITY: CLEAR
COLOR: YELLOW
EPITHELIAL CELLS, UA: 1 /HPF (ref 0–5)
GLUCOSE URINE: NEGATIVE MG/DL
HYALINE CASTS: 0 /LPF (ref 0–8)
KETONES, URINE: NEGATIVE MG/DL
LEUKOCYTE ESTERASE, URINE: NEGATIVE
MICROSCOPIC EXAMINATION: NORMAL
NITRITE, URINE: NEGATIVE
PH UA: 6 (ref 5–8)
PROTEIN UA: NEGATIVE MG/DL
RBC UA: 1 /HPF (ref 0–4)
SPECIFIC GRAVITY UA: 1.01 (ref 1–1.03)
URINE REFLEX TO CULTURE: NORMAL
URINE TYPE: NORMAL
UROBILINOGEN, URINE: 0.2 E.U./DL
WBC UA: 2 /HPF (ref 0–5)

## 2019-12-20 PROCEDURE — 81003 URINALYSIS AUTO W/O SCOPE: CPT

## 2019-12-20 PROCEDURE — 36415 COLL VENOUS BLD VENIPUNCTURE: CPT

## 2019-12-20 PROCEDURE — 81001 URINALYSIS AUTO W/SCOPE: CPT

## 2019-12-20 PROCEDURE — 83036 HEMOGLOBIN GLYCOSYLATED A1C: CPT

## 2019-12-21 LAB
ESTIMATED AVERAGE GLUCOSE: 162.8 MG/DL
HBA1C MFR BLD: 7.3 %

## 2019-12-23 ENCOUNTER — TELEPHONE (OUTPATIENT)
Dept: ORTHOPEDIC SURGERY | Age: 79
End: 2019-12-23

## 2019-12-27 ENCOUNTER — TELEPHONE (OUTPATIENT)
Dept: ORTHOPEDIC SURGERY | Age: 79
End: 2019-12-27

## 2020-01-14 NOTE — PROGRESS NOTES
[facility] / [facility] ([facility]) [address] [facility], [address]    Acknowledgment of Informed Consent for Surgical or Medical Procedure and Sedation  I agree to allow doctor(s) [facility] and his/her associates or assistants, including residents and/or other qualified medical practitioner to perform the following medical treatment or procedure and to administer or direct the administration of sedation as necessary:  Procedure(s): LEFT TOTAL HIP ARTHROPLASTY  My doctor has explained the following regarding the proposed procedure:   the explanation of the procedure   the benefits of the procedure   the potential problems that might occur during recuperation   the risks and side effects of the procedure which could include but are not limited to severe blood loss, infection, stroke or death   the benefits, risks and side effect of alternative procedures including the consequences of declining this procedure or any alternative procedures   the likelihood of achieving satisfactory results. I acknowledge no guarantee or assurance has been made to me regarding the results. I understand that during the course of this treatment/procedure, unforeseen conditions can occur which require an additional or different procedure. I agree to allow my physician or assistants to perform such extension of the original procedure as they may find necessary. I understand that sedation will often result in temporary impairment of memory and fine motor skills and that sedation can occasionally progress to a state of deep sedation or general anesthesia. I understand the risks of anesthesia for surgery include, but are not limited to, sore throat, hoarseness, injury to face, mouth, or teeth; nausea; headache; injury to blood vessels or nerves; death, brain damage, or paralysis.     I understand that if I have a Limitation of Treatment order in effect during my hospitalization, the order may or may not be in effect during this procedure. I give my doctor permission to give me blood or blood products. I understand that there are risks with receiving blood such as hepatitis, AIDS, fever, or allergic reaction. I acknowledge that the risks, benefits, and alternatives of this treatment have been explained to me and that no express or implied warranty has been given by the hospital, any blood bank, or any person or entity as to the blood or blood components transfused. At the discretion of my doctor, I agree to allow observers, equipment/product representatives and allow photographing, and/or televising of the procedure, provided my name or identity is maintained confidentially. I agree the hospital may dispose of or use for scientific or educational purposes any tissue, fluid, or body parts which may be removed.     ________________________________Date________Time______ am/pm  ([place] [facility])  Patient or Signature of Closest Relative or Legal Guardian    ________________________________Date________Time______am/pm      Page 1 of  1  Witness

## 2020-01-15 ENCOUNTER — NURSE ONLY (OUTPATIENT)
Dept: CARDIOLOGY CLINIC | Age: 80
End: 2020-01-15
Payer: MEDICARE

## 2020-01-15 PROCEDURE — 93000 ELECTROCARDIOGRAM COMPLETE: CPT | Performed by: INTERNAL MEDICINE

## 2020-01-17 LAB
ALBUMIN SERPL-MCNC: 3.8 G/DL (ref 3.5–5)
ANION GAP SERPL CALCULATED.3IONS-SCNC: 12 MMOL/L (ref 6–18)
BUN BLDV-MCNC: 31 MG/DL (ref 8–26)
CALCIUM SERPL-MCNC: 8.7 MG/DL (ref 8.5–10.5)
CHLORIDE BLD-SCNC: 106 MEQ/L (ref 101–111)
CO2: 20 MMOL/L (ref 24–36)
CREAT SERPL-MCNC: 1.72 MG/DL (ref 0.44–1.03)
GFR AFRICAN AMERICAN: 31 ML/MIN/1.73 M2
GFR NON-AFRICAN AMERICAN: 27 ML/MIN/1.73 M2
GLUCOSE BLD-MCNC: 140 MG/DL (ref 70–99)
HCT VFR BLD CALC: 34.6 % (ref 36–46)
HEMOGLOBIN: 11.8 G/DL (ref 12–15.2)
MCH RBC QN AUTO: 30.2 PG (ref 27–33)
MCHC RBC AUTO-ENTMCNC: 34 G/DL (ref 32–36)
MCV RBC AUTO: 89 FL (ref 82–97)
PARATHYROID HORMONE INTACT: 98.3 PG/ML (ref 15–65)
PDW BLD-RTO: 12.4 %
PHOSPHORUS: 4.5 MG/DL (ref 2.5–4.5)
PLATELET # BLD: 244 THOU/MCL (ref 140–375)
PMV BLD AUTO: 7.4 FL (ref 7.4–11.5)
POTASSIUM SERPL-SCNC: 4.5 MEQ/L (ref 3.6–5.1)
RBC # BLD: 3.89 MIL/MCL (ref 3.8–5.2)
SODIUM BLD-SCNC: 138 MEQ/L (ref 135–145)
WBC # BLD: 7.2 THOU/MCL (ref 3.6–10.5)

## 2020-01-20 ENCOUNTER — TELEPHONE (OUTPATIENT)
Dept: ORTHOPEDIC SURGERY | Age: 80
End: 2020-01-20

## 2020-01-20 NOTE — TELEPHONE ENCOUNTER
Auth: NPR  Date: 2/4/2020  Reference # None  Spoke with: None  Type of SX: Inpatient  Location: [facility]  CPT [id number]   SX area: LT hip

## 2020-01-23 ENCOUNTER — HOSPITAL ENCOUNTER (OUTPATIENT)
Dept: PREADMISSION TESTING | Age: 80
Discharge: HOME | End: 2020-01-27
Payer: MEDICARE

## 2020-01-23 ENCOUNTER — TELEPHONE (OUTPATIENT)
Dept: CARDIOLOGY CLINIC | Age: 80
End: 2020-01-23

## 2020-01-23 VITALS
HEART RATE: 57 BPM | TEMPERATURE: 96.9 F | SYSTOLIC BLOOD PRESSURE: 145 MMHG | RESPIRATION RATE: 16 BRPM | OXYGEN SATURATION: 99 % | WEIGHT: 193 LBS | HEIGHT: 67 IN | DIASTOLIC BLOOD PRESSURE: 64 MMHG | BODY MASS INDEX: 30.29 KG/M2

## 2020-01-23 LAB
ABO/RH: NORMAL
ALBUMIN SERPL-MCNC: 4.1 G/DL (ref 3.4–5)
ALP BLD-CCNC: 89 U/L (ref 40–129)
ALT SERPL-CCNC: 16 U/L (ref 10–40)
AMORPHOUS: ABNORMAL /HPF
ANTIBODY SCREEN: NORMAL
APTT: 34.7 SEC (ref 24.2–36.2)
AST SERPL-CCNC: 19 U/L (ref 15–37)
BACTERIA: ABNORMAL /HPF
BASOPHILS ABSOLUTE: 0 K/UL (ref 0–0.2)
BASOPHILS RELATIVE PERCENT: 0.6 %
BILIRUB SERPL-MCNC: <0.2 MG/DL (ref 0–1)
BILIRUBIN DIRECT: <0.2 MG/DL (ref 0–0.3)
BILIRUBIN URINE: NEGATIVE
BILIRUBIN, INDIRECT: NORMAL MG/DL (ref 0–1)
BLOOD, URINE: NEGATIVE
CLARITY: CLEAR
COLOR: YELLOW
EOSINOPHILS ABSOLUTE: 0.2 K/UL (ref 0–0.6)
EOSINOPHILS RELATIVE PERCENT: 2.7 %
EPITHELIAL CELLS, UA: ABNORMAL /HPF
GLUCOSE URINE: NEGATIVE MG/DL
HCT VFR BLD CALC: 33.5 % (ref 36–48)
HEMOGLOBIN: 11.2 G/DL (ref 12–16)
INR BLD: 1.03 (ref 0.86–1.14)
KETONES, URINE: NEGATIVE MG/DL
LEUKOCYTE ESTERASE, URINE: ABNORMAL
LYMPHOCYTES ABSOLUTE: 1.2 K/UL (ref 1–5.1)
LYMPHOCYTES RELATIVE PERCENT: 19.9 %
MCH RBC QN AUTO: 30.4 PG (ref 26–34)
MCHC RBC AUTO-ENTMCNC: 33.3 G/DL (ref 31–36)
MCV RBC AUTO: 91.3 FL (ref 80–100)
MICROSCOPIC EXAMINATION: YES
MONOCYTES ABSOLUTE: 0.4 K/UL (ref 0–1.3)
MONOCYTES RELATIVE PERCENT: 6.7 %
NEUTROPHILS ABSOLUTE: 4.4 K/UL (ref 1.7–7.7)
NEUTROPHILS RELATIVE PERCENT: 70.1 %
NITRITE, URINE: NEGATIVE
PDW BLD-RTO: 13.3 % (ref 12.4–15.4)
PH UA: 6.5 (ref 5–8)
PLATELET # BLD: 230 K/UL (ref 135–450)
PMV BLD AUTO: 7.5 FL (ref 5–10.5)
PROTEIN UA: NEGATIVE MG/DL
PROTHROMBIN TIME: 12 SEC (ref 10–13.2)
RBC # BLD: 3.67 M/UL (ref 4–5.2)
RBC UA: ABNORMAL /HPF (ref 0–2)
SPECIFIC GRAVITY UA: 1.01 (ref 1–1.03)
TOTAL PROTEIN: 7.3 G/DL (ref 6.4–8.2)
URINE TYPE: ABNORMAL
UROBILINOGEN, URINE: 0.2 E.U./DL
WBC # BLD: 6.3 K/UL (ref 4–11)
WBC UA: ABNORMAL /HPF (ref 0–5)

## 2020-01-23 PROCEDURE — 86900 BLOOD TYPING SEROLOGIC ABO: CPT

## 2020-01-23 PROCEDURE — 87086 URINE CULTURE/COLONY COUNT: CPT

## 2020-01-23 PROCEDURE — 86901 BLOOD TYPING SEROLOGIC RH(D): CPT

## 2020-01-23 PROCEDURE — 86850 RBC ANTIBODY SCREEN: CPT

## 2020-01-23 PROCEDURE — 80076 HEPATIC FUNCTION PANEL: CPT

## 2020-01-23 PROCEDURE — 83036 HEMOGLOBIN GLYCOSYLATED A1C: CPT

## 2020-01-23 PROCEDURE — 85025 COMPLETE CBC W/AUTO DIFF WBC: CPT

## 2020-01-23 PROCEDURE — 81001 URINALYSIS AUTO W/SCOPE: CPT

## 2020-01-23 PROCEDURE — 85730 THROMBOPLASTIN TIME PARTIAL: CPT

## 2020-01-23 PROCEDURE — 85610 PROTHROMBIN TIME: CPT

## 2020-01-23 PROCEDURE — 87641 MR-STAPH DNA AMP PROBE: CPT

## 2020-01-23 RX ORDER — MONTELUKAST SODIUM 10 MG/1
10 TABLET ORAL NIGHTLY PRN
COMMUNITY
End: 2020-03-23

## 2020-01-23 NOTE — PROGRESS NOTES
hospital room. 15. If you have a Living Will or Durable Power of , please bring a copy on the day of your procedure. 15. With your permission, one family member may accompany you while you are being prepared for surgery. Once you are ready, additional family members may join you. HOW WE KEEP YOU SAFE and WORK TO PREVENT SURGICAL SITE INFECTIONS:  1. Health care workers should always check your ID bracelet to verify your name and birth date. You will be asked many times to state your name, date of birth, and allergies. 2. Health care workers should always clean their hands with soap or alcohol gel before providing care to you. It is okay to ask anyone if they cleaned their hands before they touch you. 3. You will be actively involved in verifying the type of procedure you are having and ensuring the correct surgical site. This will be confirmed multiple times prior to your procedure. Do NOT berta your surgery site UNLESS instructed to by your surgeon. 4. Do not shave or wax for 72 hours prior to procedure near your operative site. Shaving with a razor can irritate your skin and make it easier to develop an infection. On the day of your procedure, any hair that needs to be removed near the surgical site will be clipped by a healthcare worker using a special clippers designed to avoid skin irritation. 5. When you are in the operating room, your surgical site will be cleansed with a special soap, and in most cases, you will be given an antibiotic before the surgery begins. What to expect AFTER YOUR PROCEDURE:  1. Immediately following your procedure, your will be taken to the PACU for the first phase of your recovery. Your nurse will help you recover from any potential side effects of anesthesia, such as extreme drowsiness, changes in your vital signs or breathing patterns. Nausea, headache, muscle aches, or sore throat may also occur after anesthesia.   Your nurse will help you manage these potential side effects. 2. For comfort and safety, arrange to have someone at home with you for the first 24 hours after discharge. 3. You and your family will be given written instructions about your diet, activity, dressing care, medications, and return visits. 4. Once at home, should issues with nausea, pain, or bleeding occur, or should you notice any signs of infection, you should call your surgeon. 5. Always clean your hands before and after caring for your wound. Do not let your family touch your surgery site without cleaning their hands. 6. Narcotic pain medications can cause significant constipation. You may want to add a stool softener to your postoperative medication schedule or speak to your surgeon on how best to manage this SIDE EFFECT. SPECIAL INSTRUCTIONS     Thank you for allowing us to care for you. We strive to exceed your expectations in the delivery of care and service provided to you and your family. If you need to contact us for any reason, please call us at [phone]    Instructions reviewed with patient during preadmission testing phone interview. Maeve Carr. 1/23/2020 .9:33 AM      ADDITIONAL EDUCATIONAL INFORMATION REVIEWED PER PHONE WITH YOU AND/OR YOUR FAMILY:  No Bring a urine sample on day of surgery  Yes Pain Goal-Taking Control of Your Pain  Yes FAQs about Surgical Site Infections  Yes Hibiclens® Bathing Instructions   Yes Antibacterial Soap  Yes Malachi® Wipes Bathing Instructions (Obtained from: https://www.Outline App/. pdf )  Yes Incentive Spirometer Education  No Other

## 2020-01-23 NOTE — PROGRESS NOTES
Pt states Dr. Sandor Yanes made some adjustments to her meds and will have a follow up /bmp with Dr. Sandor Yanes on 1/27. This will be reviewed by Dr. Rafael Torres giving nephrology clearance. Spoke with Ayanna Lucas at Dr. Juan Matos office. Cardiac clearance requested. Pt had an EKG done in her office office on 1-16, epic note says EKG to be reviewed by Dr. Melania Vásquez. 1/29/20 rec'd cardiac clearance on EKG from cardiologist.  Scanned into EPIC. Also letter addressing Plavix. /AG    1/29/20 @1001 Called Dr. Sandor Yanes office re: nephrology clearance now that repeat BMP done 1/27. Gavi to check with physician/ MA and see where clearance stands/ag    1/30/20 @ 1000 Spoke with Joseph Vasquez at Dr. Sandor Yanes office re: nephrology clearance.    Dr out of office until 2/4, she will reach out to him and see what he can do. Patricia Mckeon

## 2020-01-23 NOTE — TELEPHONE ENCOUNTER
CARDIAC CLEARANCE     What type of procedure are you having? Hip replacement    Which physician is performing your procedure? Dr Lolly Anders- Pt had EKG done 2/23/20    When is your procedure scheduled for?  2/4/20    Where are you having this procedure? [facility]    Are you taking Blood Thinners? Plavix   If so what? (Name/dose/frequesncy)     Does the surgeon want you to stop your blood thinner?   If so for how long? 7  days    Phone Number and Contact Name for Physicians office: [phone]    Fax number to send information:  [phone]

## 2020-01-24 LAB
ESTIMATED AVERAGE GLUCOSE: 162.8 MG/DL
HBA1C MFR BLD: 7.3 %
MRSA SCREEN RT-PCR: NORMAL
URINE CULTURE, ROUTINE: NORMAL

## 2020-01-27 ENCOUNTER — OFFICE VISIT (OUTPATIENT)
Dept: ORTHOPEDIC SURGERY | Age: 80
End: 2020-01-27

## 2020-01-27 VITALS
HEART RATE: 62 BPM | SYSTOLIC BLOOD PRESSURE: 111 MMHG | BODY MASS INDEX: 27.78 KG/M2 | DIASTOLIC BLOOD PRESSURE: 47 MMHG | WEIGHT: 177 LBS | HEIGHT: 67 IN

## 2020-01-27 PROCEDURE — 99024 POSTOP FOLLOW-UP VISIT: CPT | Performed by: ORTHOPAEDIC SURGERY

## 2020-01-28 LAB
ALBUMIN SERPL-MCNC: 4 G/DL (ref 3.5–5)
ANION GAP SERPL CALCULATED.3IONS-SCNC: 11 MMOL/L (ref 6–18)
BUN BLDV-MCNC: 34 MG/DL (ref 8–26)
CALCIUM SERPL-MCNC: 9 MG/DL (ref 8.5–10.5)
CHLORIDE BLD-SCNC: 105 MEQ/L (ref 101–111)
CO2: 23 MMOL/L (ref 24–36)
CREAT SERPL-MCNC: 1.8 MG/DL (ref 0.44–1.03)
GFR AFRICAN AMERICAN: 30 ML/MIN/1.73 M2
GFR NON-AFRICAN AMERICAN: 26 ML/MIN/1.73 M2
GLUCOSE BLD-MCNC: 223 MG/DL (ref 70–99)
HCT VFR BLD CALC: 31.6 % (ref 36–46)
HEMOGLOBIN: 11.2 G/DL (ref 12–15.2)
MCH RBC QN AUTO: 31.5 PG (ref 27–33)
MCHC RBC AUTO-ENTMCNC: 35.3 G/DL (ref 32–36)
MCV RBC AUTO: 89.2 FL (ref 82–97)
PDW BLD-RTO: 12.3 %
PHOSPHORUS: 4.6 MG/DL (ref 2.5–4.5)
PLATELET # BLD: 227 THOU/MCL (ref 140–375)
PMV BLD AUTO: 7.7 FL (ref 7.4–11.5)
POTASSIUM SERPL-SCNC: 4.6 MEQ/L (ref 3.6–5.1)
RBC # BLD: 3.54 MIL/MCL (ref 3.8–5.2)
SODIUM BLD-SCNC: 139 MEQ/L (ref 135–145)
WBC # BLD: 6.6 THOU/MCL (ref 3.6–10.5)

## 2020-02-03 ENCOUNTER — ANESTHESIA EVENT (OUTPATIENT)
Dept: OPERATING ROOM | Age: 80
DRG: 470 | End: 2020-02-03
Payer: MEDICARE

## 2020-02-03 NOTE — PROGRESS NOTES
2/3/20 2220 [facility] nephrology office - still awaiting determination on clearance. MA to discuss with MD and will call to advise this RN or place letter in epic with MD recommendation. Office aware patient's surgery is in AM    2/3/20 @ [phone] still no note in care everywhere from Dr. Elia Bautista. Nicole, at Dr. Denae Carlos notified. Check Care everywhere DOS for clearance.

## 2020-02-04 ENCOUNTER — ANESTHESIA (OUTPATIENT)
Dept: OPERATING ROOM | Age: 80
DRG: 470 | End: 2020-02-04
Payer: MEDICARE

## 2020-02-04 ENCOUNTER — HOSPITAL ENCOUNTER (INPATIENT)
Age: 80
LOS: 3 days | Discharge: NURSING, REHAB OR LONG-TERM CARE | DRG: 470 | End: 2020-02-07
Attending: ORTHOPAEDIC SURGERY | Admitting: ORTHOPAEDIC SURGERY
Payer: MEDICARE

## 2020-02-04 ENCOUNTER — APPOINTMENT (OUTPATIENT)
Dept: GENERAL RADIOLOGY | Age: 80
DRG: 470 | End: 2020-02-04
Attending: ORTHOPAEDIC SURGERY
Payer: MEDICARE

## 2020-02-04 VITALS
TEMPERATURE: 99.3 F | SYSTOLIC BLOOD PRESSURE: 120 MMHG | RESPIRATION RATE: 25 BRPM | DIASTOLIC BLOOD PRESSURE: 57 MMHG | OXYGEN SATURATION: 99 %

## 2020-02-04 PROBLEM — Z96.642 STATUS POST TOTAL HIP REPLACEMENT, LEFT: Status: ACTIVE | Noted: 2020-02-04

## 2020-02-04 LAB
ABO/RH: NORMAL
ANTIBODY SCREEN: NORMAL
GLUCOSE BLD-MCNC: 203 MG/DL (ref 70–99)
GLUCOSE BLD-MCNC: 209 MG/DL (ref 70–99)
GLUCOSE BLD-MCNC: 210 MG/DL (ref 70–99)
GLUCOSE BLD-MCNC: 90 MG/DL (ref 70–99)
PERFORMED ON: ABNORMAL
PERFORMED ON: NORMAL

## 2020-02-04 PROCEDURE — C1713 ANCHOR/SCREW BN/BN,TIS/BN: HCPCS | Performed by: ORTHOPAEDIC SURGERY

## 2020-02-04 PROCEDURE — 3209999900 FLUORO FOR SURGICAL PROCEDURES

## 2020-02-04 PROCEDURE — 2580000003 HC RX 258: Performed by: ORTHOPAEDIC SURGERY

## 2020-02-04 PROCEDURE — 86901 BLOOD TYPING SEROLOGIC RH(D): CPT

## 2020-02-04 PROCEDURE — 86850 RBC ANTIBODY SCREEN: CPT

## 2020-02-04 PROCEDURE — 6360000002 HC RX W HCPCS: Performed by: ORTHOPAEDIC SURGERY

## 2020-02-04 PROCEDURE — 64450 NJX AA&/STRD OTHER PN/BRANCH: CPT | Performed by: ANESTHESIOLOGY

## 2020-02-04 PROCEDURE — 6360000002 HC RX W HCPCS: Performed by: ANESTHESIOLOGY

## 2020-02-04 PROCEDURE — 3700000001 HC ADD 15 MINUTES (ANESTHESIA): Performed by: ORTHOPAEDIC SURGERY

## 2020-02-04 PROCEDURE — 2500000003 HC RX 250 WO HCPCS: Performed by: NURSE ANESTHETIST, CERTIFIED REGISTERED

## 2020-02-04 PROCEDURE — 2720000010 HC SURG SUPPLY STERILE: Performed by: ORTHOPAEDIC SURGERY

## 2020-02-04 PROCEDURE — 2500000003 HC RX 250 WO HCPCS: Performed by: ORTHOPAEDIC SURGERY

## 2020-02-04 PROCEDURE — 73502 X-RAY EXAM HIP UNI 2-3 VIEWS: CPT

## 2020-02-04 PROCEDURE — 3E0T3BZ INTRODUCTION OF ANESTHETIC AGENT INTO PERIPHERAL NERVES AND PLEXI, PERCUTANEOUS APPROACH: ICD-10-PCS | Performed by: ANESTHESIOLOGY

## 2020-02-04 PROCEDURE — 3600000005 HC SURGERY LEVEL 5 BASE: Performed by: ORTHOPAEDIC SURGERY

## 2020-02-04 PROCEDURE — 3600000015 HC SURGERY LEVEL 5 ADDTL 15MIN: Performed by: ORTHOPAEDIC SURGERY

## 2020-02-04 PROCEDURE — 2580000003 HC RX 258: Performed by: ANESTHESIOLOGY

## 2020-02-04 PROCEDURE — 6360000002 HC RX W HCPCS: Performed by: INTERNAL MEDICINE

## 2020-02-04 PROCEDURE — 73501 X-RAY EXAM HIP UNI 1 VIEW: CPT

## 2020-02-04 PROCEDURE — 3600000004 HC SURGERY LEVEL 4 BASE: Performed by: ORTHOPAEDIC SURGERY

## 2020-02-04 PROCEDURE — 86900 BLOOD TYPING SEROLOGIC ABO: CPT

## 2020-02-04 PROCEDURE — 6360000002 HC RX W HCPCS: Performed by: NURSE ANESTHETIST, CERTIFIED REGISTERED

## 2020-02-04 PROCEDURE — 7100000001 HC PACU RECOVERY - ADDTL 15 MIN: Performed by: ORTHOPAEDIC SURGERY

## 2020-02-04 PROCEDURE — 6370000000 HC RX 637 (ALT 250 FOR IP): Performed by: ORTHOPAEDIC SURGERY

## 2020-02-04 PROCEDURE — 3600000014 HC SURGERY LEVEL 4 ADDTL 15MIN: Performed by: ORTHOPAEDIC SURGERY

## 2020-02-04 PROCEDURE — 2709999900 HC NON-CHARGEABLE SUPPLY: Performed by: ORTHOPAEDIC SURGERY

## 2020-02-04 PROCEDURE — 3700000000 HC ANESTHESIA ATTENDED CARE: Performed by: ORTHOPAEDIC SURGERY

## 2020-02-04 PROCEDURE — 1200000000 HC SEMI PRIVATE

## 2020-02-04 PROCEDURE — 0SRB06Z REPLACEMENT OF LEFT HIP JOINT WITH OXIDIZED ZIRCONIUM ON POLYETHYLENE SYNTHETIC SUBSTITUTE, OPEN APPROACH: ICD-10-PCS | Performed by: ORTHOPAEDIC SURGERY

## 2020-02-04 PROCEDURE — C1776 JOINT DEVICE (IMPLANTABLE): HCPCS | Performed by: ORTHOPAEDIC SURGERY

## 2020-02-04 PROCEDURE — 7100000000 HC PACU RECOVERY - FIRST 15 MIN: Performed by: ORTHOPAEDIC SURGERY

## 2020-02-04 DEVICE — REDAPT LOCKING SCREW 30MM
Type: IMPLANTABLE DEVICE | Site: HIP | Status: FUNCTIONAL
Brand: REDAPT

## 2020-02-04 DEVICE — REFLECTION THREADED HOLE COVER
Type: IMPLANTABLE DEVICE | Site: HIP | Status: FUNCTIONAL
Brand: REFLECTION

## 2020-02-04 DEVICE — ANCHOR SUT L16.3MM DIA5.5MM TI W/ TWO SZ 2 FIBERWIRE CRKSCR: Type: IMPLANTABLE DEVICE | Site: HIP | Status: FUNCTIONAL

## 2020-02-04 DEVICE — REDAPT MODULAR SHELL 54MM
Type: IMPLANTABLE DEVICE | Site: HIP | Status: FUNCTIONAL
Brand: REDAPT

## 2020-02-04 DEVICE — OXINIUM FEMORAL HEAD 12/14 TAPER                                    36 MM +0
Type: IMPLANTABLE DEVICE | Site: HIP | Status: FUNCTIONAL
Brand: OXINIUM

## 2020-02-04 DEVICE — REDAPT LOCKING SCREW 35MM
Type: IMPLANTABLE DEVICE | Site: HIP | Status: FUNCTIONAL
Brand: REDAPT

## 2020-02-04 DEVICE — REFLECTION SPHERICAL HEAD SCREW 25MM
Type: IMPLANTABLE DEVICE | Site: HIP | Status: FUNCTIONAL
Brand: REFLECTION

## 2020-02-04 DEVICE — REDAPT LOCKING SCREW 25MM
Type: IMPLANTABLE DEVICE | Site: HIP | Status: FUNCTIONAL
Brand: REDAPT

## 2020-02-04 DEVICE — R3 0 DEGREE XLPE ACETABULAR LINER                                    36MM ID X OD 54MM
Type: IMPLANTABLE DEVICE | Site: HIP | Status: FUNCTIONAL
Brand: R3

## 2020-02-04 DEVICE — REDAPT LOCKING SCREW 20MM
Type: IMPLANTABLE DEVICE | Site: HIP | Status: FUNCTIONAL
Brand: REDAPT

## 2020-02-04 DEVICE — SYNERGY POROUS COATED FEMORAL SIZE 12
Type: IMPLANTABLE DEVICE | Site: HIP | Status: FUNCTIONAL
Brand: SYNERGY

## 2020-02-04 DEVICE — COMPONENT TOT HIP CAPPED FEM ADV HD POROUS OXIN XLPE R3: Type: IMPLANTABLE DEVICE | Site: HIP | Status: FUNCTIONAL

## 2020-02-04 RX ORDER — DOCUSATE SODIUM 100 MG/1
100 CAPSULE, LIQUID FILLED ORAL 2 TIMES DAILY
Status: DISCONTINUED | OUTPATIENT
Start: 2020-02-04 | End: 2020-02-07 | Stop reason: HOSPADM

## 2020-02-04 RX ORDER — CETIRIZINE HYDROCHLORIDE 10 MG/1
5 TABLET ORAL DAILY
Status: DISCONTINUED | OUTPATIENT
Start: 2020-02-04 | End: 2020-02-07 | Stop reason: HOSPADM

## 2020-02-04 RX ORDER — PROMETHAZINE HYDROCHLORIDE 25 MG/ML
12.5 INJECTION, SOLUTION INTRAMUSCULAR; INTRAVENOUS ONCE
Status: COMPLETED | OUTPATIENT
Start: 2020-02-04 | End: 2020-02-04

## 2020-02-04 RX ORDER — NEOSTIGMINE METHYLSULFATE 5 MG/5 ML
SYRINGE (ML) INTRAVENOUS PRN
Status: DISCONTINUED | OUTPATIENT
Start: 2020-02-04 | End: 2020-02-04 | Stop reason: SDUPTHER

## 2020-02-04 RX ORDER — AMOXICILLIN 250 MG
2 CAPSULE ORAL PRN
COMMUNITY

## 2020-02-04 RX ORDER — DEXAMETHASONE SODIUM PHOSPHATE 4 MG/ML
4 INJECTION, SOLUTION INTRA-ARTICULAR; INTRALESIONAL; INTRAMUSCULAR; INTRAVENOUS; SOFT TISSUE
Status: DISCONTINUED | OUTPATIENT
Start: 2020-02-04 | End: 2020-02-04 | Stop reason: HOSPADM

## 2020-02-04 RX ORDER — CLOPIDOGREL BISULFATE 75 MG/1
75 TABLET ORAL DAILY
Status: DISCONTINUED | OUTPATIENT
Start: 2020-02-04 | End: 2020-02-07 | Stop reason: HOSPADM

## 2020-02-04 RX ORDER — NITROFURANTOIN 25; 75 MG/1; MG/1
100 CAPSULE ORAL 2 TIMES DAILY
COMMUNITY
Start: 2020-01-28 | End: 2020-03-23

## 2020-02-04 RX ORDER — SODIUM CHLORIDE 0.9 % (FLUSH) 0.9 %
10 SYRINGE (ML) INJECTION PRN
Status: DISCONTINUED | OUTPATIENT
Start: 2020-02-04 | End: 2020-02-07 | Stop reason: HOSPADM

## 2020-02-04 RX ORDER — ACETAMINOPHEN 325 MG/1
650 TABLET ORAL EVERY 6 HOURS
Status: DISCONTINUED | OUTPATIENT
Start: 2020-02-04 | End: 2020-02-07 | Stop reason: HOSPADM

## 2020-02-04 RX ORDER — ATORVASTATIN CALCIUM 80 MG/1
80 TABLET, FILM COATED ORAL NIGHTLY
Status: DISCONTINUED | OUTPATIENT
Start: 2020-02-04 | End: 2020-02-07 | Stop reason: HOSPADM

## 2020-02-04 RX ORDER — PROPOFOL 10 MG/ML
INJECTION, EMULSION INTRAVENOUS PRN
Status: DISCONTINUED | OUTPATIENT
Start: 2020-02-04 | End: 2020-02-04 | Stop reason: SDUPTHER

## 2020-02-04 RX ORDER — FENTANYL CITRATE 50 UG/ML
25 INJECTION, SOLUTION INTRAMUSCULAR; INTRAVENOUS EVERY 5 MIN PRN
Status: DISCONTINUED | OUTPATIENT
Start: 2020-02-04 | End: 2020-02-04 | Stop reason: HOSPADM

## 2020-02-04 RX ORDER — ONDANSETRON 2 MG/ML
4 INJECTION INTRAMUSCULAR; INTRAVENOUS EVERY 6 HOURS PRN
Status: DISCONTINUED | OUTPATIENT
Start: 2020-02-04 | End: 2020-02-07 | Stop reason: HOSPADM

## 2020-02-04 RX ORDER — PROCHLORPERAZINE EDISYLATE 5 MG/ML
10 INJECTION INTRAMUSCULAR; INTRAVENOUS EVERY 6 HOURS PRN
Status: DISCONTINUED | OUTPATIENT
Start: 2020-02-04 | End: 2020-02-07 | Stop reason: HOSPADM

## 2020-02-04 RX ORDER — ASPIRIN 81 MG/1
81 TABLET ORAL 2 TIMES DAILY
Status: DISCONTINUED | OUTPATIENT
Start: 2020-02-04 | End: 2020-02-07 | Stop reason: HOSPADM

## 2020-02-04 RX ORDER — SENNA AND DOCUSATE SODIUM 50; 8.6 MG/1; MG/1
1 TABLET, FILM COATED ORAL 2 TIMES DAILY
Status: DISCONTINUED | OUTPATIENT
Start: 2020-02-04 | End: 2020-02-07 | Stop reason: HOSPADM

## 2020-02-04 RX ORDER — NEBIVOLOL 5 MG/1
5 TABLET ORAL DAILY
Status: DISCONTINUED | OUTPATIENT
Start: 2020-02-04 | End: 2020-02-06

## 2020-02-04 RX ORDER — NICOTINE POLACRILEX 4 MG
15 LOZENGE BUCCAL PRN
Status: DISCONTINUED | OUTPATIENT
Start: 2020-02-04 | End: 2020-02-07 | Stop reason: HOSPADM

## 2020-02-04 RX ORDER — GLYCOPYRROLATE 1 MG/5 ML
SYRINGE (ML) INTRAVENOUS PRN
Status: DISCONTINUED | OUTPATIENT
Start: 2020-02-04 | End: 2020-02-04 | Stop reason: SDUPTHER

## 2020-02-04 RX ORDER — MIDAZOLAM HYDROCHLORIDE 1 MG/ML
INJECTION INTRAMUSCULAR; INTRAVENOUS PRN
Status: DISCONTINUED | OUTPATIENT
Start: 2020-02-04 | End: 2020-02-04 | Stop reason: SDUPTHER

## 2020-02-04 RX ORDER — OXYCODONE HYDROCHLORIDE 5 MG/1
10 TABLET ORAL EVERY 4 HOURS PRN
Status: DISCONTINUED | OUTPATIENT
Start: 2020-02-04 | End: 2020-02-07 | Stop reason: HOSPADM

## 2020-02-04 RX ORDER — OXYCODONE HYDROCHLORIDE 5 MG/1
5 TABLET ORAL EVERY 4 HOURS PRN
Status: DISCONTINUED | OUTPATIENT
Start: 2020-02-04 | End: 2020-02-07 | Stop reason: HOSPADM

## 2020-02-04 RX ORDER — ALPRAZOLAM 0.5 MG/1
0.5 TABLET ORAL 3 TIMES DAILY PRN
Status: DISCONTINUED | OUTPATIENT
Start: 2020-02-04 | End: 2020-02-07 | Stop reason: HOSPADM

## 2020-02-04 RX ORDER — DEXAMETHASONE SODIUM PHOSPHATE 4 MG/ML
3 INJECTION, SOLUTION INTRA-ARTICULAR; INTRALESIONAL; INTRAMUSCULAR; INTRAVENOUS; SOFT TISSUE EVERY 12 HOURS
Status: DISCONTINUED | OUTPATIENT
Start: 2020-02-05 | End: 2020-02-06

## 2020-02-04 RX ORDER — DEXTROSE MONOHYDRATE 25 G/50ML
12.5 INJECTION, SOLUTION INTRAVENOUS PRN
Status: DISCONTINUED | OUTPATIENT
Start: 2020-02-04 | End: 2020-02-07 | Stop reason: HOSPADM

## 2020-02-04 RX ORDER — HYDROMORPHONE HCL 110MG/55ML
PATIENT CONTROLLED ANALGESIA SYRINGE INTRAVENOUS PRN
Status: DISCONTINUED | OUTPATIENT
Start: 2020-02-04 | End: 2020-02-04 | Stop reason: SDUPTHER

## 2020-02-04 RX ORDER — DEXAMETHASONE SODIUM PHOSPHATE 4 MG/ML
6 INJECTION, SOLUTION INTRA-ARTICULAR; INTRALESIONAL; INTRAMUSCULAR; INTRAVENOUS; SOFT TISSUE ONCE
Status: COMPLETED | OUTPATIENT
Start: 2020-02-04 | End: 2020-02-04

## 2020-02-04 RX ORDER — SODIUM CHLORIDE, SODIUM LACTATE, POTASSIUM CHLORIDE, CALCIUM CHLORIDE 600; 310; 30; 20 MG/100ML; MG/100ML; MG/100ML; MG/100ML
INJECTION, SOLUTION INTRAVENOUS CONTINUOUS
Status: DISCONTINUED | OUTPATIENT
Start: 2020-02-04 | End: 2020-02-04

## 2020-02-04 RX ORDER — ONDANSETRON 2 MG/ML
INJECTION INTRAMUSCULAR; INTRAVENOUS PRN
Status: DISCONTINUED | OUTPATIENT
Start: 2020-02-04 | End: 2020-02-04 | Stop reason: SDUPTHER

## 2020-02-04 RX ORDER — SODIUM BICARBONATE 650 MG/1
650 TABLET ORAL EVERY EVENING
Status: DISCONTINUED | OUTPATIENT
Start: 2020-02-04 | End: 2020-02-06

## 2020-02-04 RX ORDER — DEXTROSE MONOHYDRATE 50 MG/ML
100 INJECTION, SOLUTION INTRAVENOUS PRN
Status: DISCONTINUED | OUTPATIENT
Start: 2020-02-04 | End: 2020-02-07 | Stop reason: HOSPADM

## 2020-02-04 RX ORDER — ROPIVACAINE HYDROCHLORIDE 5 MG/ML
INJECTION, SOLUTION EPIDURAL; INFILTRATION; PERINEURAL
Status: COMPLETED | OUTPATIENT
Start: 2020-02-04 | End: 2020-02-04

## 2020-02-04 RX ORDER — PHENYLEPHRINE HYDROCHLORIDE 10 MG/ML
INJECTION INTRAVENOUS PRN
Status: DISCONTINUED | OUTPATIENT
Start: 2020-02-04 | End: 2020-02-04 | Stop reason: SDUPTHER

## 2020-02-04 RX ORDER — SODIUM CHLORIDE 9 MG/ML
INJECTION, SOLUTION INTRAVENOUS CONTINUOUS
Status: DISCONTINUED | OUTPATIENT
Start: 2020-02-04 | End: 2020-02-07

## 2020-02-04 RX ORDER — SODIUM CHLORIDE 0.9 % (FLUSH) 0.9 %
10 SYRINGE (ML) INJECTION EVERY 12 HOURS SCHEDULED
Status: DISCONTINUED | OUTPATIENT
Start: 2020-02-04 | End: 2020-02-07 | Stop reason: HOSPADM

## 2020-02-04 RX ORDER — NITROFURANTOIN 25; 75 MG/1; MG/1
100 CAPSULE ORAL 2 TIMES DAILY
Status: COMPLETED | OUTPATIENT
Start: 2020-02-04 | End: 2020-02-06

## 2020-02-04 RX ORDER — VENLAFAXINE HYDROCHLORIDE 150 MG/1
150 CAPSULE, EXTENDED RELEASE ORAL NIGHTLY
Status: DISCONTINUED | OUTPATIENT
Start: 2020-02-04 | End: 2020-02-07 | Stop reason: HOSPADM

## 2020-02-04 RX ORDER — FENTANYL CITRATE 50 UG/ML
INJECTION, SOLUTION INTRAMUSCULAR; INTRAVENOUS PRN
Status: DISCONTINUED | OUTPATIENT
Start: 2020-02-04 | End: 2020-02-04 | Stop reason: SDUPTHER

## 2020-02-04 RX ORDER — SENNA AND DOCUSATE SODIUM 50; 8.6 MG/1; MG/1
2 TABLET, FILM COATED ORAL DAILY PRN
Status: DISCONTINUED | OUTPATIENT
Start: 2020-02-04 | End: 2020-02-07 | Stop reason: HOSPADM

## 2020-02-04 RX ORDER — EPHEDRINE SULFATE 50 MG/ML
INJECTION, SOLUTION INTRAVENOUS PRN
Status: DISCONTINUED | OUTPATIENT
Start: 2020-02-04 | End: 2020-02-04 | Stop reason: SDUPTHER

## 2020-02-04 RX ORDER — DEXAMETHASONE SODIUM PHOSPHATE 4 MG/ML
10 INJECTION, SOLUTION INTRA-ARTICULAR; INTRALESIONAL; INTRAMUSCULAR; INTRAVENOUS; SOFT TISSUE ONCE
Status: COMPLETED | OUTPATIENT
Start: 2020-02-04 | End: 2020-02-04

## 2020-02-04 RX ORDER — ONDANSETRON 2 MG/ML
4 INJECTION INTRAMUSCULAR; INTRAVENOUS
Status: COMPLETED | OUTPATIENT
Start: 2020-02-04 | End: 2020-02-04

## 2020-02-04 RX ORDER — MAGNESIUM HYDROXIDE 1200 MG/15ML
LIQUID ORAL CONTINUOUS PRN
Status: COMPLETED | OUTPATIENT
Start: 2020-02-04 | End: 2020-02-04

## 2020-02-04 RX ORDER — SODIUM CHLORIDE 9 MG/ML
INJECTION, SOLUTION INTRAVENOUS CONTINUOUS
Status: DISCONTINUED | OUTPATIENT
Start: 2020-02-04 | End: 2020-02-04

## 2020-02-04 RX ORDER — ROCURONIUM BROMIDE 10 MG/ML
INJECTION, SOLUTION INTRAVENOUS PRN
Status: DISCONTINUED | OUTPATIENT
Start: 2020-02-04 | End: 2020-02-04 | Stop reason: SDUPTHER

## 2020-02-04 RX ORDER — OXYCODONE HCL 10 MG/1
10 TABLET, FILM COATED, EXTENDED RELEASE ORAL ONCE
Status: COMPLETED | OUTPATIENT
Start: 2020-02-04 | End: 2020-02-04

## 2020-02-04 RX ADMIN — MIDAZOLAM HYDROCHLORIDE 1 MG: 2 INJECTION, SOLUTION INTRAMUSCULAR; INTRAVENOUS at 09:15

## 2020-02-04 RX ADMIN — OXYCODONE HYDROCHLORIDE 10 MG: 10 TABLET, FILM COATED, EXTENDED RELEASE ORAL at 08:56

## 2020-02-04 RX ADMIN — EPHEDRINE SULFATE 5 MG: 50 INJECTION, SOLUTION INTRAMUSCULAR; INTRAVENOUS; SUBCUTANEOUS at 11:55

## 2020-02-04 RX ADMIN — ROCURONIUM BROMIDE 50 MG: 10 INJECTION, SOLUTION INTRAVENOUS at 10:15

## 2020-02-04 RX ADMIN — FENTANYL CITRATE 50 MCG: 50 INJECTION INTRAMUSCULAR; INTRAVENOUS at 10:45

## 2020-02-04 RX ADMIN — EPHEDRINE SULFATE 10 MG: 50 INJECTION, SOLUTION INTRAMUSCULAR; INTRAVENOUS; SUBCUTANEOUS at 14:02

## 2020-02-04 RX ADMIN — HYDROMORPHONE HYDROCHLORIDE 0.5 MG: 2 INJECTION, SOLUTION INTRAMUSCULAR; INTRAVENOUS; SUBCUTANEOUS at 13:10

## 2020-02-04 RX ADMIN — PROPOFOL 50 MG: 10 INJECTION, EMULSION INTRAVENOUS at 10:25

## 2020-02-04 RX ADMIN — VENLAFAXINE HYDROCHLORIDE 150 MG: 150 CAPSULE, EXTENDED RELEASE ORAL at 21:33

## 2020-02-04 RX ADMIN — CLOPIDOGREL BISULFATE 75 MG: 75 TABLET ORAL at 21:32

## 2020-02-04 RX ADMIN — DEXAMETHASONE SODIUM PHOSPHATE 6 MG: 4 INJECTION, SOLUTION INTRAMUSCULAR; INTRAVENOUS at 21:28

## 2020-02-04 RX ADMIN — FENTANYL CITRATE 50 MCG: 50 INJECTION INTRAMUSCULAR; INTRAVENOUS at 09:15

## 2020-02-04 RX ADMIN — HYDROMORPHONE HYDROCHLORIDE 0.5 MG: 2 INJECTION, SOLUTION INTRAMUSCULAR; INTRAVENOUS; SUBCUTANEOUS at 11:13

## 2020-02-04 RX ADMIN — PROPOFOL 100 MG: 10 INJECTION, EMULSION INTRAVENOUS at 10:15

## 2020-02-04 RX ADMIN — PROMETHAZINE HYDROCHLORIDE 12.5 MG: 25 INJECTION INTRAMUSCULAR; INTRAVENOUS at 21:25

## 2020-02-04 RX ADMIN — TRANEXAMIC ACID 1000 MG: 1 INJECTION, SOLUTION INTRAVENOUS at 10:10

## 2020-02-04 RX ADMIN — SODIUM BICARBONATE 650 MG TABLET 650 MG: at 20:32

## 2020-02-04 RX ADMIN — SODIUM CHLORIDE: 9 INJECTION, SOLUTION INTRAVENOUS at 16:12

## 2020-02-04 RX ADMIN — PHENYLEPHRINE HYDROCHLORIDE 100 MCG: 10 INJECTION INTRAVENOUS at 12:27

## 2020-02-04 RX ADMIN — ATORVASTATIN CALCIUM 80 MG: 80 TABLET, FILM COATED ORAL at 21:33

## 2020-02-04 RX ADMIN — ONDANSETRON 4 MG: 2 INJECTION INTRAMUSCULAR; INTRAVENOUS at 14:00

## 2020-02-04 RX ADMIN — VANCOMYCIN HYDROCHLORIDE 1250 MG: 10 INJECTION, POWDER, LYOPHILIZED, FOR SOLUTION INTRAVENOUS at 10:10

## 2020-02-04 RX ADMIN — Medication 0.6 MG: at 14:14

## 2020-02-04 RX ADMIN — SODIUM CHLORIDE: 9 INJECTION, SOLUTION INTRAVENOUS at 08:52

## 2020-02-04 RX ADMIN — ONDANSETRON 4 MG: 2 INJECTION INTRAMUSCULAR; INTRAVENOUS at 17:35

## 2020-02-04 RX ADMIN — PHENYLEPHRINE HYDROCHLORIDE 100 MCG: 10 INJECTION INTRAVENOUS at 12:46

## 2020-02-04 RX ADMIN — ROPIVACAINE HYDROCHLORIDE 30 ML: 5 INJECTION, SOLUTION EPIDURAL; INFILTRATION; PERINEURAL at 09:20

## 2020-02-04 RX ADMIN — DEXAMETHASONE SODIUM PHOSPHATE 10 MG: 4 INJECTION, SOLUTION INTRAMUSCULAR; INTRAVENOUS at 09:00

## 2020-02-04 RX ADMIN — Medication 10 ML: at 21:36

## 2020-02-04 RX ADMIN — EPHEDRINE SULFATE 5 MG: 50 INJECTION, SOLUTION INTRAMUSCULAR; INTRAVENOUS; SUBCUTANEOUS at 11:00

## 2020-02-04 RX ADMIN — FENTANYL CITRATE 50 MCG: 50 INJECTION INTRAMUSCULAR; INTRAVENOUS at 10:15

## 2020-02-04 RX ADMIN — EPHEDRINE SULFATE 5 MG: 50 INJECTION, SOLUTION INTRAMUSCULAR; INTRAVENOUS; SUBCUTANEOUS at 13:37

## 2020-02-04 RX ADMIN — TRANEXAMIC ACID 1000 MG: 1 INJECTION, SOLUTION INTRAVENOUS at 15:03

## 2020-02-04 RX ADMIN — VANCOMYCIN HYDROCHLORIDE 1500 MG: 10 INJECTION, POWDER, LYOPHILIZED, FOR SOLUTION INTRAVENOUS at 22:00

## 2020-02-04 RX ADMIN — SODIUM CHLORIDE, SODIUM LACTATE, POTASSIUM CHLORIDE, AND CALCIUM CHLORIDE: 600; 310; 30; 20 INJECTION, SOLUTION INTRAVENOUS at 12:01

## 2020-02-04 RX ADMIN — ROCURONIUM BROMIDE 20 MG: 10 INJECTION, SOLUTION INTRAVENOUS at 13:04

## 2020-02-04 RX ADMIN — NEBIVOLOL HYDROCHLORIDE 5 MG: 5 TABLET ORAL at 21:34

## 2020-02-04 RX ADMIN — ALPRAZOLAM 0.5 MG: 0.5 TABLET ORAL at 21:33

## 2020-02-04 RX ADMIN — NITROFURANTOIN (MONOHYDRATE/MACROCRYSTALS) 100 MG: 75; 25 CAPSULE ORAL at 21:33

## 2020-02-04 RX ADMIN — Medication 0.2 MG: at 10:53

## 2020-02-04 RX ADMIN — Medication 0.2 MG: at 12:16

## 2020-02-04 RX ADMIN — PHENYLEPHRINE HYDROCHLORIDE 100 MCG: 10 INJECTION INTRAVENOUS at 12:36

## 2020-02-04 RX ADMIN — ROCURONIUM BROMIDE 50 MG: 10 INJECTION, SOLUTION INTRAVENOUS at 10:45

## 2020-02-04 RX ADMIN — ASPIRIN 81 MG: 81 TABLET, COATED ORAL at 23:30

## 2020-02-04 RX ADMIN — INSULIN HUMAN 4 UNITS: 100 INJECTION, SOLUTION PARENTERAL at 21:57

## 2020-02-04 RX ADMIN — CETIRIZINE HYDROCHLORIDE 5 MG: 10 TABLET, FILM COATED ORAL at 21:24

## 2020-02-04 RX ADMIN — DOCUSATE SODIUM 100 MG: 100 CAPSULE, LIQUID FILLED ORAL at 21:35

## 2020-02-04 RX ADMIN — Medication 5 MG: at 14:14

## 2020-02-04 RX ADMIN — SENNOSIDES AND DOCUSATE SODIUM 1 TABLET: 8.6; 5 TABLET ORAL at 21:32

## 2020-02-04 ASSESSMENT — PULMONARY FUNCTION TESTS
PIF_VALUE: 17
PIF_VALUE: 17
PIF_VALUE: 19
PIF_VALUE: 15
PIF_VALUE: 17
PIF_VALUE: 20
PIF_VALUE: 17
PIF_VALUE: 4
PIF_VALUE: 17
PIF_VALUE: 19
PIF_VALUE: 21
PIF_VALUE: 17
PIF_VALUE: 15
PIF_VALUE: 17
PIF_VALUE: 19
PIF_VALUE: 21
PIF_VALUE: 17
PIF_VALUE: 17
PIF_VALUE: 20
PIF_VALUE: 15
PIF_VALUE: 17
PIF_VALUE: 19
PIF_VALUE: 22
PIF_VALUE: 17
PIF_VALUE: 21
PIF_VALUE: 17
PIF_VALUE: 22
PIF_VALUE: 28
PIF_VALUE: 17
PIF_VALUE: 18
PIF_VALUE: 17
PIF_VALUE: 2
PIF_VALUE: 17
PIF_VALUE: 17
PIF_VALUE: 15
PIF_VALUE: 18
PIF_VALUE: 17
PIF_VALUE: 22
PIF_VALUE: 3
PIF_VALUE: 15
PIF_VALUE: 17
PIF_VALUE: 22
PIF_VALUE: 17
PIF_VALUE: 20
PIF_VALUE: 17
PIF_VALUE: 17
PIF_VALUE: 3
PIF_VALUE: 17
PIF_VALUE: 17
PIF_VALUE: 20
PIF_VALUE: 17
PIF_VALUE: 17
PIF_VALUE: 3
PIF_VALUE: 17
PIF_VALUE: 17
PIF_VALUE: 19
PIF_VALUE: 17
PIF_VALUE: 17
PIF_VALUE: 22
PIF_VALUE: 17
PIF_VALUE: 15
PIF_VALUE: 17
PIF_VALUE: 17
PIF_VALUE: 0
PIF_VALUE: 17
PIF_VALUE: 15
PIF_VALUE: 17
PIF_VALUE: 20
PIF_VALUE: 18
PIF_VALUE: 15
PIF_VALUE: 17
PIF_VALUE: 3
PIF_VALUE: 18
PIF_VALUE: 17
PIF_VALUE: 14
PIF_VALUE: 17
PIF_VALUE: 1
PIF_VALUE: 4
PIF_VALUE: 17
PIF_VALUE: 20
PIF_VALUE: 17
PIF_VALUE: 17
PIF_VALUE: 14
PIF_VALUE: 17
PIF_VALUE: 17
PIF_VALUE: 21
PIF_VALUE: 21
PIF_VALUE: 17
PIF_VALUE: 0
PIF_VALUE: 17
PIF_VALUE: 15
PIF_VALUE: 17
PIF_VALUE: 0
PIF_VALUE: 17
PIF_VALUE: 17
PIF_VALUE: 18
PIF_VALUE: 20
PIF_VALUE: 15
PIF_VALUE: 21
PIF_VALUE: 17
PIF_VALUE: 1
PIF_VALUE: 17
PIF_VALUE: 3
PIF_VALUE: 17
PIF_VALUE: 3
PIF_VALUE: 17
PIF_VALUE: 15
PIF_VALUE: 3
PIF_VALUE: 15
PIF_VALUE: 1
PIF_VALUE: 20
PIF_VALUE: 19
PIF_VALUE: 17
PIF_VALUE: 17
PIF_VALUE: 21
PIF_VALUE: 15
PIF_VALUE: 15
PIF_VALUE: 17
PIF_VALUE: 22
PIF_VALUE: 17
PIF_VALUE: 21
PIF_VALUE: 15
PIF_VALUE: 17
PIF_VALUE: 23
PIF_VALUE: 17
PIF_VALUE: 17
PIF_VALUE: 0
PIF_VALUE: 21
PIF_VALUE: 18
PIF_VALUE: 17
PIF_VALUE: 18
PIF_VALUE: 17
PIF_VALUE: 20
PIF_VALUE: 17
PIF_VALUE: 15
PIF_VALUE: 17
PIF_VALUE: 21
PIF_VALUE: 15
PIF_VALUE: 1
PIF_VALUE: 17
PIF_VALUE: 17
PIF_VALUE: 20
PIF_VALUE: 17
PIF_VALUE: 18
PIF_VALUE: 18
PIF_VALUE: 17
PIF_VALUE: 31
PIF_VALUE: 17
PIF_VALUE: 24
PIF_VALUE: 17
PIF_VALUE: 18
PIF_VALUE: 17
PIF_VALUE: 20
PIF_VALUE: 17
PIF_VALUE: 0
PIF_VALUE: 17
PIF_VALUE: 20
PIF_VALUE: 19
PIF_VALUE: 17
PIF_VALUE: 15
PIF_VALUE: 19
PIF_VALUE: 17
PIF_VALUE: 1
PIF_VALUE: 17
PIF_VALUE: 18
PIF_VALUE: 17
PIF_VALUE: 15

## 2020-02-04 ASSESSMENT — PAIN SCALES - GENERAL
PAINLEVEL_OUTOF10: 0

## 2020-02-04 ASSESSMENT — PAIN - FUNCTIONAL ASSESSMENT: PAIN_FUNCTIONAL_ASSESSMENT: 0-10

## 2020-02-04 ASSESSMENT — PAIN DESCRIPTION - PAIN TYPE: TYPE: CHRONIC PAIN

## 2020-02-04 ASSESSMENT — LIFESTYLE VARIABLES: SMOKING_STATUS: 1

## 2020-02-04 NOTE — PROGRESS NOTES
Patient admitted to PACU # 11 from OR at [address] [facility] per Dr. Adeline Murray. Attached to PACU monitoring system and report received from anesthesia provider. Patient was reported to be hemodynamically stable during procedure. Patient drowsy on admission and denied pain.

## 2020-02-04 NOTE — ANESTHESIA PROCEDURE NOTES
Peripheral Block    Patient location during procedure: pre-op  Start time: 2/4/2020 9:10 AM  End time: 2/4/2020 9:20 AM  Staffing  Anesthesiologist: Bubba Pinon DO  Performed: anesthesiologist   Preanesthetic Checklist  Completed: patient identified, site marked, surgical consent, pre-op evaluation, timeout performed, IV checked, risks and benefits discussed, monitors and equipment checked, anesthesia consent given, oxygen available and patient being monitored  Peripheral Block  Patient position: supine  Prep: ChloraPrep  Patient monitoring: cardiac monitor, continuous pulse ox, frequent blood pressure checks and IV access  Block type: Fascia iliaca  Laterality: left  Injection technique: single-shot  Procedures: ultrasound guided  Provider prep: mask and sterile gloves  Needle  Needle type: short-bevel   Needle gauge: 21 G  Needle length: 8 cm  Needle localization: ultrasound guidance  Assessment  Injection assessment: negative aspiration for heme and local visualized surrounding nerve on ultrasound  Hemodynamics: stable  Reason for block: procedure for pain, post-op pain management and at surgeon's request

## 2020-02-04 NOTE — ANESTHESIA PRE PROCEDURE
Department of Anesthesiology  Preprocedure Note       Name:  Joe Hernandez   Age:  78 y.o.  :  1940                                          MRN:  [id number]         Date:  2020      Surgeon: Mary Black):  Rita Alvarado MD    Procedure: LEFT TOTAL HIP ARTHROPLASTY (Left )    Medications prior to admission:   Prior to Admission medications    Medication Sig Start Date End Date Taking? Authorizing Provider   nitrofurantoin, macrocrystal-monohydrate, (MACROBID) 100 MG capsule Take 100 mg by mouth 2 times daily 20  Yes Historical Provider, MD   Insulin Degludec (TRESIBA FLEXTOUCH SC) Inject 32 Units into the skin every morning   Yes Historical Provider, MD   Insulin Pen Needle 32G X 4 MM MISC Use as directed for insulin administration 19  Yes Historical Provider, MD   ferrous sulfate (SLOW FE) 142 (45 Fe) MG extended release tablet Take 1 tablet by mouth daily    Yes Historical Provider, MD   sodium bicarbonate 650 MG tablet Take 1 tablet by mouth daily  Patient taking differently: Take 650 mg by mouth daily Two tablets daily 10/2/19  Yes Yesika Tucker MD   atorvastatin (LIPITOR) 80 MG tablet Take 80 mg by mouth nightly    Yes Historical Provider, MD   venlafaxine (EFFEXOR XR) 150 MG extended release capsule Take 150 mg by mouth nightly    Yes Historical Provider, MD   nebivolol (BYSTOLIC) 5 MG tablet Take 5 mg by mouth daily    Yes Historical Provider, MD   alprazolam (XANAX) 0.5 MG tablet Take 0.5 mg by mouth 3 times daily as needed. 8/19/10  Yes Historical Provider, MD   montelukast (SINGULAIR) 10 MG tablet Take 10 mg by mouth nightly as needed     Historical Provider, MD   aspirin 81 MG EC tablet Take 81 mg by mouth daily. Historical Provider, MD   clopidogrel (PLAVIX) 75 MG tablet Take 75 mg by mouth daily.     Historical Provider, MD       Current medications:    Current Facility-Administered Medications   Medication Dose Route Frequency Provider Last Rate Last Dose I25.10    Hypertension I10    Hyperlipidemia E78.5    PVD (peripheral vascular disease) ([facility]) I73.9    Tobacco abuse Z72.0    Otorrhea of right ear H92.11    Chronic eczematous otitis externa of right ear H60.8X1    JHONNY (acute kidney injury) ([facility] [address] N17.9    Hyperkalemia E87.5    CKD (chronic kidney disease), stage III ([facility]) N18.3       Past Medical History:        Diagnosis Date    Arthritis     Blind left eye     CAD (coronary artery disease)     Chronic back pain     Diabetes mellitus ([facility])     Hearing loss     both ears    Hyperlipidemia     Hypertension     PONV (postoperative nausea and vomiting)        Past Surgical History:        Procedure Laterality Date    COLONOSCOPY      CORONARY ANGIOPLASTY WITH STENT PLACEMENT      2 stents     EPIDURAL STEROID INJECTION Bilateral 2/27/2019    BILATERAL L4 TRANSFORAMINAL EPIDURAL STEROID INJECTION WITH FLUOROSCOPY performed by Stephanie Recinos MD at [facility] N/A 3/27/2019    L4/L5 INTERLAMINAR EPIDURAL STEROID INJECTION WITH FLUOROSCOPY performed by Stephanie Recinos MD at [address] [place] [address] Right     PROSTHETIC EYE     LUMBAR NERVE BLOCK N/A 8/14/2019    MIDLINE L4L5 INTERLAMINAR EPIDURAL STEROID INJECTION WITH FLUOROSCOPY performed by Stephanie Recinos MD at [place] [address] History:    Social History     Tobacco Use    Smoking status: Current Every Day Smoker     Packs/day: 0.25     Types: E-Cigarettes    Smokeless tobacco: Never Used    Tobacco comment: vape not cigarettes   Substance Use Topics    Alcohol use:  No                                Ready to quit: Not Answered  Counseling given: Not Answered  Comment: vape not cigarettes      Vital Signs (Current):   Vitals:    01/27/20 0920 02/04/20 0805   BP:  (!) 146/62   Pulse:  64   Resp:  16   Temp:  97.7 °F (36.5 °C)   TempSrc:  Oral   SpO2:  98%   Weight: 193 lb (87.5 kg) 193 lb (87.5 kg)   Height: 5' 7\" (1.702 m) 5' 7\" (1.702 m) BP Readings from Last 3 Encounters:   02/04/20 (!) 146/62   01/23/20 (!) 145/64   01/27/20 (!) 111/47       NPO Status:                                                                                 BMI:   Wt Readings from Last 3 Encounters:   02/04/20 193 lb (87.5 kg)   01/23/20 193 lb (87.5 kg)   01/27/20 177 lb (80.3 kg)     Body mass index is 30.23 kg/m². CBC:   Lab Results   Component Value Date    WBC 6.6 01/28/2020    RBC 3.54 01/28/2020    HGB 11.2 01/28/2020    HCT 31.6 01/28/2020    MCV 89.2 01/28/2020    RDW 12.3 01/28/2020     01/28/2020       CMP:   Lab Results   Component Value Date     01/28/2020    K 4.6 01/28/2020     01/28/2020    CO2 23 01/28/2020    BUN 34 01/28/2020    CREATININE 1.80 01/28/2020    GFRAA 30 01/28/2020    LABGLOM 26 01/28/2020    GLUCOSE 223 01/28/2020    PROT 7.3 01/23/2020    CALCIUM 9.0 01/28/2020    BILITOT <0.2 01/23/2020    ALKPHOS 89 01/23/2020    AST 19 01/23/2020    ALT 16 01/23/2020       POC Tests: No results for input(s): POCGLU, POCNA, POCK, POCCL, POCBUN, POCHEMO, POCHCT in the last 72 hours. Coags:   Lab Results   Component Value Date    PROTIME 12.0 01/23/2020    INR 1.03 01/23/2020    APTT 34.7 01/23/2020       HCG (If Applicable): No results found for: PREGTESTUR, PREGSERUM, HCG, HCGQUANT     ABGs: No results found for: PHART, PO2ART, FLL3VQG, QEB0RUN, BEART, T1XPGVXJ     Type & Screen (If Applicable):  No results found for: LABABO, [address]    Anesthesia Evaluation  Patient summary reviewed and Nursing notes reviewed no history of anesthetic complications:   Airway: Mallampati: II  TM distance: >3 FB   Neck ROM: full  Mouth opening: > = 3 FB Dental:    (+) upper dentures and lower dentures      Pulmonary:Negative Pulmonary ROS and normal exam  breath sounds clear to auscultation  (+) current smoker          Patient did not smoke on day of surgery.                  Cardiovascular:    (+) hypertension: mild, CAD: no interval change, CABG/stent: no interval change,       ECG reviewed  Rhythm: regular  Rate: normal           Beta Blocker:  Dose within 24 Hrs         Neuro/Psych:   Negative Neuro/Psych ROS              GI/Hepatic/Renal: Neg GI/Hepatic/Renal ROS            Endo/Other:    (+) DiabetesType II DM, well controlled, , .                 Abdominal:       Abdomen: soft. Vascular: negative vascular ROS. Anesthesia Plan      general     ASA 3       Induction: intravenous. MIPS: Postoperative opioids intended and Prophylactic antiemetics administered. Anesthetic plan and risks discussed with patient. Use of blood products discussed with patient whom consented to blood products. Plan discussed with attending and CRNA.     Attending anesthesiologist reviewed and agrees with Pre Eval content              Arianne Sherwood DO   2/4/2020

## 2020-02-04 NOTE — BRIEF OP NOTE
Brief Postoperative Note  ______________________________________________________________    Patient: Venecia Judge  YOB: 1940  MRN: [id number]  Date of Procedure: 2/4/2020    Pre-Op Diagnosis: Primary osteoarthritis of left hip [M16.12]    Post-Op Diagnosis: Same       Procedure(s):  LEFT TOTAL HIP ARTHROPLASTY    Anesthesia: General    Surgeon(s):  MD Nishant Mckeon DO Terrye Pitcher, MD Terrye Pitcher, MD      Estimated Blood Loss (mL): 458     Complications: None    Specimens:   * No specimens in log *    Implants:  * No implants in log *      Drains: * No LDAs found *    Findings: see operative report    Nishant Luo DO  Date: 2/4/2020  Time: 2:18 PM

## 2020-02-04 NOTE — H&P
[address] [facility]    [id number]    [facility] [employer] Same Day Surgery Update H & P  Department of General Surgery   Surgical Service   Pre-operative History and Physical  Last H & P within the last 30 days. DIAGNOSIS:   Primary osteoarthritis of left hip [M16.12]    PROCEDURE:  CT TOTAL HIP ARTHROPLASTY [15766] (LEFT TOTAL HIP ARTHROPLASTY)     HISTORY OF PRESENT ILLNESS:    Patient with chronic left hip pain and limited ROM in the setting of arthrosis. The symptoms have been recalcitrant to conservative treatment and the patient presents today for the above procedure.      Past Medical History:        Diagnosis Date    Arthritis     Blind left eye     CAD (coronary artery disease)     Chronic back pain     Diabetes mellitus ([address]     Hearing loss     both ears    Hyperlipidemia     Hypertension     PONV (postoperative nausea and vomiting)      Past Surgical History:        Procedure Laterality Date    COLONOSCOPY      CORONARY ANGIOPLASTY WITH STENT PLACEMENT      2 stents     EPIDURAL STEROID INJECTION Bilateral 2/27/2019    BILATERAL L4 TRANSFORAMINAL EPIDURAL STEROID INJECTION WITH FLUOROSCOPY performed by Jc Hsu MD at [facility] N/A 3/27/2019    L4/L5 INTERLAMINAR EPIDURAL STEROID INJECTION WITH FLUOROSCOPY performed by Jc Hsu MD at [id number] [place] [address] Right     PROSTHETIC EYE     LUMBAR NERVE BLOCK N/A 8/14/2019    MIDLINE L4L5 INTERLAMINAR EPIDURAL STEROID INJECTION WITH FLUOROSCOPY performed by Jc Hsu MD at [facility]     Past Social History:  Social History     Socioeconomic History    Marital status:      Spouse name: Not on file    Number of children: Not on file    Years of education: Not on file    Highest education level: Not on file   Occupational History    Not on file   Social Needs    Financial resource strain: Not on file    Food insecurity:     Worry: Not on file     Inability: Not on file   SocialKaty needs: Medical: Not on file     Non-medical: Not on file   Tobacco Use    Smoking status: Current Every Day Smoker     Packs/day: 0.25     Types: E-Cigarettes    Smokeless tobacco: Never Used    Tobacco comment: vape not cigarettes   Substance and Sexual Activity    Alcohol use: No    Drug use: No    Sexual activity: Not on file   Lifestyle    Physical activity:     Days per week: Not on file     Minutes per session: Not on file    Stress: Not on file   Relationships    Social connections:     Talks on phone: Not on file     Gets together: Not on file     Attends Sabianism service: Not on file     Active member of club or organization: Not on file     Attends meetings of clubs or organizations: Not on file     Relationship status: Not on file    Intimate partner violence:     Fear of current or ex partner: Not on file     Emotionally abused: Not on file     Physically abused: Not on file     Forced sexual activity: Not on file   Other Topics Concern    Not on file   Social History Narrative    Not on file         Medications Prior to Admission:      Prior to Admission medications    Medication Sig Start Date End Date Taking?  Authorizing Provider   montelukast (SINGULAIR) 10 MG tablet Take 10 mg by mouth nightly    Historical Provider, MD   Insulin Degludec (TRESIBA FLEXTOUCH SC) Inject 32 Units into the skin every morning    Historical Provider, MD   Insulin Pen Needle 32G X 4 MM MISC Use as directed for insulin administration 11/4/19   Historical Provider, MD   ferrous sulfate (SLOW FE) 142 (45 Fe) MG extended release tablet Take 1 tablet by mouth daily     Historical Provider, MD   sodium bicarbonate 650 MG tablet Take 1 tablet by mouth daily  Patient taking differently: Take 650 mg by mouth daily Two tablets daily 10/2/19   Jenny Magana MD   atorvastatin (LIPITOR) 80 MG tablet Take 80 mg by mouth nightly     Historical Provider, MD   venlafaxine (EFFEXOR XR) 150 MG extended release capsule

## 2020-02-04 NOTE — PROGRESS NOTES
Insulin degludec 32 units AM has been automatically interchanged to insulin glargine 32 units AM per [facility] formulary policy. Please call pharmacy with questions.   Italo Sanford, PharmD, [address] [place] [address] pharmacy: [id number]  2/4/2020 6:14 PM

## 2020-02-04 NOTE — PROGRESS NOTES
Anesthesia Dr. Dang Harman here to do Left Fascia Iliaca block. O2 placed 2L N/C   Start time:0905  Stop time:0915  Tolerated Well    See flow sheet for vital signs.

## 2020-02-04 NOTE — ANESTHESIA POSTPROCEDURE EVALUATION
Department of Anesthesiology  Postprocedure Note    Patient: Sowmya Huber  MRN: [id number]  YOB: 1940  Date of evaluation: 2/4/2020  Time:  5:41 PM     Procedure Summary     Date:  02/04/20 Room / Location:  [address] [place] [address] [address] [place] [address] / [facility]    Anesthesia Start:  1010 Anesthesia Stop:  8994    Procedure:  LEFT TOTAL HIP ARTHROPLASTY (Left Hip) Diagnosis:       Primary osteoarthritis of left hip      (Primary osteoarthritis of left hip [M16.12])    Surgeon:  Kaylene King MD Responsible Provider:  Nu Arias DO    Anesthesia Type:  general ASA Status:  3          Anesthesia Type: general    Rosina Phase I: Rosina Score: 8    Rosina Phase II:      Last vitals: Reviewed and per EMR flowsheets.        Anesthesia Post Evaluation    Patient location during evaluation: PACU  Patient participation: complete - patient participated  Level of consciousness: awake and alert  Pain score: 0  Airway patency: patent  Nausea & Vomiting: no nausea and no vomiting  Complications: no  Cardiovascular status: hemodynamically stable  Respiratory status: acceptable  Hydration status: euvolemic

## 2020-02-05 ENCOUNTER — APPOINTMENT (OUTPATIENT)
Dept: ULTRASOUND IMAGING | Age: 80
DRG: 470 | End: 2020-02-05
Attending: ORTHOPAEDIC SURGERY
Payer: MEDICARE

## 2020-02-05 LAB
ANION GAP SERPL CALCULATED.3IONS-SCNC: 16 MMOL/L (ref 3–16)
BUN BLDV-MCNC: 42 MG/DL (ref 7–20)
CALCIUM SERPL-MCNC: 7.9 MG/DL (ref 8.3–10.6)
CHLORIDE BLD-SCNC: 100 MMOL/L (ref 99–110)
CO2: 17 MMOL/L (ref 21–32)
CREAT SERPL-MCNC: 2.2 MG/DL (ref 0.6–1.2)
GFR AFRICAN AMERICAN: 26
GFR NON-AFRICAN AMERICAN: 22
GLUCOSE BLD-MCNC: 184 MG/DL (ref 70–99)
GLUCOSE BLD-MCNC: 200 MG/DL (ref 70–99)
GLUCOSE BLD-MCNC: 231 MG/DL (ref 70–99)
GLUCOSE BLD-MCNC: 250 MG/DL (ref 70–99)
GLUCOSE BLD-MCNC: 260 MG/DL (ref 70–99)
GLUCOSE BLD-MCNC: 298 MG/DL (ref 70–99)
HCT VFR BLD CALC: 25.1 % (ref 36–48)
HEMOGLOBIN: 8.1 G/DL (ref 12–16)
MCH RBC QN AUTO: 30.1 PG (ref 26–34)
MCHC RBC AUTO-ENTMCNC: 32.3 G/DL (ref 31–36)
MCV RBC AUTO: 93.4 FL (ref 80–100)
PDW BLD-RTO: 13.8 % (ref 12.4–15.4)
PERFORMED ON: ABNORMAL
PLATELET # BLD: 171 K/UL (ref 135–450)
PMV BLD AUTO: 7.8 FL (ref 5–10.5)
POTASSIUM SERPL-SCNC: 5.6 MMOL/L (ref 3.5–5.1)
RBC # BLD: 2.69 M/UL (ref 4–5.2)
SODIUM BLD-SCNC: 133 MMOL/L (ref 136–145)
WBC # BLD: 10 K/UL (ref 4–11)

## 2020-02-05 PROCEDURE — 1200000000 HC SEMI PRIVATE

## 2020-02-05 PROCEDURE — 51798 US URINE CAPACITY MEASURE: CPT

## 2020-02-05 PROCEDURE — 6370000000 HC RX 637 (ALT 250 FOR IP): Performed by: ORTHOPAEDIC SURGERY

## 2020-02-05 PROCEDURE — 6360000002 HC RX W HCPCS: Performed by: ORTHOPAEDIC SURGERY

## 2020-02-05 PROCEDURE — 97161 PT EVAL LOW COMPLEX 20 MIN: CPT

## 2020-02-05 PROCEDURE — 51701 INSERT BLADDER CATHETER: CPT

## 2020-02-05 PROCEDURE — 97110 THERAPEUTIC EXERCISES: CPT

## 2020-02-05 PROCEDURE — 6370000000 HC RX 637 (ALT 250 FOR IP): Performed by: INTERNAL MEDICINE

## 2020-02-05 PROCEDURE — 97530 THERAPEUTIC ACTIVITIES: CPT

## 2020-02-05 PROCEDURE — 97166 OT EVAL MOD COMPLEX 45 MIN: CPT

## 2020-02-05 PROCEDURE — 97116 GAIT TRAINING THERAPY: CPT

## 2020-02-05 PROCEDURE — 76770 US EXAM ABDO BACK WALL COMP: CPT

## 2020-02-05 PROCEDURE — 97535 SELF CARE MNGMENT TRAINING: CPT

## 2020-02-05 PROCEDURE — 80048 BASIC METABOLIC PNL TOTAL CA: CPT

## 2020-02-05 PROCEDURE — 85027 COMPLETE CBC AUTOMATED: CPT

## 2020-02-05 PROCEDURE — 36415 COLL VENOUS BLD VENIPUNCTURE: CPT

## 2020-02-05 PROCEDURE — 2580000003 HC RX 258: Performed by: ORTHOPAEDIC SURGERY

## 2020-02-05 RX ORDER — TAMSULOSIN HYDROCHLORIDE 0.4 MG/1
0.4 CAPSULE ORAL DAILY
Status: DISCONTINUED | OUTPATIENT
Start: 2020-02-05 | End: 2020-02-07 | Stop reason: HOSPADM

## 2020-02-05 RX ORDER — ASPIRIN 81 MG/1
81 TABLET ORAL 2 TIMES DAILY
Qty: 56 TABLET | Refills: 0 | COMMUNITY
Start: 2020-02-05 | End: 2022-10-25

## 2020-02-05 RX ORDER — OXYCODONE HYDROCHLORIDE AND ACETAMINOPHEN 5; 325 MG/1; MG/1
1 TABLET ORAL EVERY 6 HOURS PRN
Qty: 28 TABLET | Refills: 0 | Status: SHIPPED | OUTPATIENT
Start: 2020-02-05 | End: 2020-02-12

## 2020-02-05 RX ORDER — TAMSULOSIN HYDROCHLORIDE 0.4 MG/1
0.4 CAPSULE ORAL DAILY
Qty: 10 CAPSULE | Refills: 0 | Status: SHIPPED | OUTPATIENT
Start: 2020-02-06 | End: 2022-08-24

## 2020-02-05 RX ORDER — SODIUM POLYSTYRENE SULFONATE 15 G/60ML
15 SUSPENSION ORAL; RECTAL ONCE
Status: COMPLETED | OUTPATIENT
Start: 2020-02-05 | End: 2020-02-05

## 2020-02-05 RX ADMIN — INSULIN HUMAN 4 UNITS: 100 INJECTION, SOLUTION PARENTERAL at 12:28

## 2020-02-05 RX ADMIN — NITROFURANTOIN (MONOHYDRATE/MACROCRYSTALS) 100 MG: 75; 25 CAPSULE ORAL at 19:53

## 2020-02-05 RX ADMIN — VENLAFAXINE HYDROCHLORIDE 150 MG: 150 CAPSULE, EXTENDED RELEASE ORAL at 19:53

## 2020-02-05 RX ADMIN — INSULIN HUMAN 2 UNITS: 100 INJECTION, SOLUTION PARENTERAL at 18:18

## 2020-02-05 RX ADMIN — ACETAMINOPHEN 650 MG: 325 TABLET ORAL at 07:16

## 2020-02-05 RX ADMIN — ACETAMINOPHEN 650 MG: 325 TABLET ORAL at 12:28

## 2020-02-05 RX ADMIN — BENZOCAINE AND MENTHOL 1 LOZENGE: 15; 3.6 LOZENGE ORAL at 12:39

## 2020-02-05 RX ADMIN — SODIUM POLYSTYRENE SULFONATE 15 G: 15 SUSPENSION ORAL; RECTAL at 14:22

## 2020-02-05 RX ADMIN — CLOPIDOGREL BISULFATE 75 MG: 75 TABLET ORAL at 08:36

## 2020-02-05 RX ADMIN — DEXAMETHASONE SODIUM PHOSPHATE 3 MG: 4 INJECTION, SOLUTION INTRAMUSCULAR; INTRAVENOUS at 09:35

## 2020-02-05 RX ADMIN — ACETAMINOPHEN 650 MG: 325 TABLET ORAL at 00:24

## 2020-02-05 RX ADMIN — DEXAMETHASONE SODIUM PHOSPHATE 3 MG: 4 INJECTION, SOLUTION INTRAMUSCULAR; INTRAVENOUS at 22:16

## 2020-02-05 RX ADMIN — SENNOSIDES AND DOCUSATE SODIUM 1 TABLET: 8.6; 5 TABLET ORAL at 19:53

## 2020-02-05 RX ADMIN — DOCUSATE SODIUM 100 MG: 100 CAPSULE, LIQUID FILLED ORAL at 19:53

## 2020-02-05 RX ADMIN — INSULIN HUMAN 4 UNITS: 100 INJECTION, SOLUTION PARENTERAL at 07:16

## 2020-02-05 RX ADMIN — Medication 10 ML: at 19:54

## 2020-02-05 RX ADMIN — ASPIRIN 81 MG: 81 TABLET, COATED ORAL at 19:53

## 2020-02-05 RX ADMIN — INSULIN GLARGINE 32 UNITS: 100 INJECTION, SOLUTION SUBCUTANEOUS at 09:35

## 2020-02-05 RX ADMIN — DOCUSATE SODIUM 100 MG: 100 CAPSULE, LIQUID FILLED ORAL at 08:36

## 2020-02-05 RX ADMIN — ATORVASTATIN CALCIUM 80 MG: 80 TABLET, FILM COATED ORAL at 19:53

## 2020-02-05 RX ADMIN — SODIUM BICARBONATE 650 MG TABLET 650 MG: at 18:18

## 2020-02-05 RX ADMIN — CETIRIZINE HYDROCHLORIDE 5 MG: 10 TABLET, FILM COATED ORAL at 08:35

## 2020-02-05 RX ADMIN — NITROFURANTOIN (MONOHYDRATE/MACROCRYSTALS) 100 MG: 75; 25 CAPSULE ORAL at 08:36

## 2020-02-05 RX ADMIN — ACETAMINOPHEN 650 MG: 325 TABLET ORAL at 18:18

## 2020-02-05 RX ADMIN — ASPIRIN 81 MG: 81 TABLET, COATED ORAL at 08:35

## 2020-02-05 RX ADMIN — ALPRAZOLAM 0.5 MG: 0.5 TABLET ORAL at 19:53

## 2020-02-05 RX ADMIN — SENNOSIDES AND DOCUSATE SODIUM 1 TABLET: 8.6; 5 TABLET ORAL at 08:36

## 2020-02-05 RX ADMIN — BENZOCAINE AND MENTHOL 1 LOZENGE: 15; 3.6 LOZENGE ORAL at 08:36

## 2020-02-05 RX ADMIN — TAMSULOSIN HYDROCHLORIDE 0.4 MG: 0.4 CAPSULE ORAL at 08:35

## 2020-02-05 RX ADMIN — INSULIN HUMAN 2 UNITS: 100 INJECTION, SOLUTION PARENTERAL at 22:10

## 2020-02-05 RX ADMIN — NEBIVOLOL HYDROCHLORIDE 5 MG: 5 TABLET ORAL at 09:34

## 2020-02-05 ASSESSMENT — PAIN DESCRIPTION - PAIN TYPE
TYPE: SURGICAL PAIN

## 2020-02-05 ASSESSMENT — PAIN DESCRIPTION - LOCATION
LOCATION: HIP

## 2020-02-05 ASSESSMENT — PAIN DESCRIPTION - DESCRIPTORS
DESCRIPTORS: DISCOMFORT

## 2020-02-05 ASSESSMENT — PAIN DESCRIPTION - ORIENTATION
ORIENTATION: LEFT

## 2020-02-05 ASSESSMENT — PAIN DESCRIPTION - FREQUENCY
FREQUENCY: INTERMITTENT

## 2020-02-05 ASSESSMENT — PAIN DESCRIPTION - PROGRESSION
CLINICAL_PROGRESSION: NOT CHANGED

## 2020-02-05 ASSESSMENT — PAIN SCALES - GENERAL
PAINLEVEL_OUTOF10: 1
PAINLEVEL_OUTOF10: 3
PAINLEVEL_OUTOF10: 0
PAINLEVEL_OUTOF10: 2
PAINLEVEL_OUTOF10: 2
PAINLEVEL_OUTOF10: 0

## 2020-02-05 ASSESSMENT — PAIN - FUNCTIONAL ASSESSMENT
PAIN_FUNCTIONAL_ASSESSMENT: ACTIVITIES ARE NOT PREVENTED

## 2020-02-05 ASSESSMENT — PAIN DESCRIPTION - ONSET
ONSET: ON-GOING

## 2020-02-05 NOTE — CONSULTS
Hospital Medicine  Consult History & Physical        Chief Complaint: Perioperative medical management, elective left hip arthroplasty    Date of Service: Pt seen/examined in consultation on 2/5/2020    History Of Present Illness:      Patient is a 75-year-old female admitted for elective left total hip arthroplasty. Patient has past medical history of CAD, diabetes, hyperlipidemia, hypertension. Sound has been consulted for medical management. Patient mentions she has pain at the site of left hip otherwise and denies chest pain shortness of breath nausea vomiting diarrhea constipation. Past Medical History:        Diagnosis Date    Arthritis     Blind left eye     CAD (coronary artery disease)     Chronic back pain     Diabetes mellitus ([address]     Hearing loss     both ears    Hyperlipidemia     Hypertension     PONV (postoperative nausea and vomiting)        Past Surgical History:        Procedure Laterality Date    COLONOSCOPY      CORONARY ANGIOPLASTY WITH STENT PLACEMENT      2 stents     EPIDURAL STEROID INJECTION Bilateral 2/27/2019    BILATERAL L4 TRANSFORAMINAL EPIDURAL STEROID INJECTION WITH FLUOROSCOPY performed by Susan Grady MD at [facility] N/A 3/27/2019    L4/L5 INTERLAMINAR EPIDURAL STEROID INJECTION WITH FLUOROSCOPY performed by Susan Grady MD at [address] [place] [address] Right     PROSTHETIC EYE     LUMBAR NERVE BLOCK N/A 8/14/2019    MIDLINE L4L5 INTERLAMINAR EPIDURAL STEROID INJECTION WITH FLUOROSCOPY performed by Susan Grady MD at [address] Left 2/4/2020    LEFT TOTAL HIP ARTHROPLASTY performed by Tereso Bonilla MD at [facility] [address]       Medications Prior to Admission:    Prior to Admission medications    Medication Sig Start Date End Date Taking?  Authorizing Provider   nitrofurantoin, macrocrystal-monohydrate, (MACROBID) 100 MG capsule Take 100 mg by mouth 2 times daily 1/28/20  Yes Historical HGB 8.1*   HCT 25.1*        Recent Labs     02/05/20  0516   *   K 5.6*      CO2 17*   BUN 42*   CREATININE 2.2*   CALCIUM 7.9*     No results for input(s): AST, ALT, BILIDIR, BILITOT, ALKPHOS in the last 72 hours. No results for input(s): INR in the last 72 hours. No results for input(s): Martina Stage in the last 72 hours. Urinalysis:    Lab Results   Component Value Date    NITRU Negative 01/23/2020    WBCUA 0-2 01/23/2020    BACTERIA Rare 01/23/2020    RBCUA 0-2 01/23/2020    RBCUA NEGATIVE 05/13/2019    BLOODU Negative 01/23/2020    SPECGRAV 1.015 01/23/2020    GLUCOSEU Negative 01/23/2020       Radiology: I have reviewed the radiology reports with the following interpretation:     US RENAL COMPLETE   Final Result   IMPRESSION :      Simple appearing right renal cyst.      Mild right renal atrophy. XR HIP 2-3 VW W PELVIS LEFT   Final Result   1. Left hip arthroplasty in anatomic alignment on AP projection. FLUORO FOR SURGICAL PROCEDURES   Final Result   1. Fluoroscopy during hip arthroplasty as described. XR HIP LEFT (1 VIEW)   Final Result      Fluoroscopic utilization for left hip arthroplasty. ASSESSMENT:    Active Hospital Problems    Diagnosis Date Noted    Status post total hip replacement, left [V72.462] 02/04/2020       Patient is a 71-year-old female admitted for elective left total hip arthroplasty. Patient has past medical history of CAD, diabetes, hyperlipidemia, hypertension. Sound has been consulted for medical management. Patient mentions she has pain at the site of left hip otherwise and denies chest pain shortness of breath nausea vomiting diarrhea constipation. Assessment  Osteoarthritis status post left hip arthroplasty  Hyperkalemia  Elevated creatinine likely CKD  Mild hyponatremia  Hyperglycemia  Anemia of CKD  Diabetes mellitus  Hypertension      Plan  We will repeat BMP, 15 mg p.o.  Kayexalate, monitor K  Continue IV fluid resuscitation, monitor creatinine  Insulin sliding scale  Medical reconciliation reviewed  Resume antihypertensive medications  PT OT following, pain control, surgical management per primary team  Discharge planning per primary team  Patient is on aspirin, statin, Plavix    Diet: DIET CARB CONTROL;  Code Status: Full Code    PT/OT Eval Status: Active and ongoing      Thank you for the consultation, will follow up as needed    Verena Avilez MD

## 2020-02-05 NOTE — PROGRESS NOTES
Orthopaedic Surgery Fellow Post Operative ANGELES Rounding Progress Note    History of Present Illness:  Sondra Bull is a 78 y.o. female who was seen this morning. There were no events overnight. Pain is controlled. They currently deny any dizziness, fevers, chills, shortness of breath, chest pain, paresthesias, or any other current complaints. Medical History:  Patient's medications, allergies, past medical, surgical, social and family histories were reviewed and updated as appropriate. They are as noted.      Social History     Socioeconomic History    Marital status:      Spouse name: Not on file    Number of children: Not on file    Years of education: Not on file    Highest education level: Not on file   Occupational History    Not on file   Social Needs    Financial resource strain: Not on file    Food insecurity:     Worry: Not on file     Inability: Not on file    Transportation needs:     Medical: Not on file     Non-medical: Not on file   Tobacco Use    Smoking status: Current Every Day Smoker     Packs/day: 0.25     Types: E-Cigarettes    Smokeless tobacco: Never Used    Tobacco comment: vape not cigarettes   Substance and Sexual Activity    Alcohol use: No    Drug use: No    Sexual activity: Not on file   Lifestyle    Physical activity:     Days per week: Not on file     Minutes per session: Not on file    Stress: Not on file   Relationships    Social connections:     Talks on phone: Not on file     Gets together: Not on file     Attends Congregation service: Not on file     Active member of club or organization: Not on file     Attends meetings of clubs or organizations: Not on file     Relationship status: Not on file    Intimate partner violence:     Fear of current or ex partner: Not on file     Emotionally abused: Not on file     Physically abused: Not on file     Forced sexual activity: Not on file   Other Topics Concern    Not on file   Social History Narrative    Not on file       Allergies   Allergen Reactions    Cephalexin Other (See Comments)     Other reaction(s): Skin Rash    Doxycycline Nausea And Vomiting and Rash     Nausea and vomiting      Levofloxacin Hives    Levofloxacin Other (See Comments)    Sulfa Antibiotics Other (See Comments) and Nausea Only     Bactrim      Ezetimibe Other (See Comments)    Metformin Other (See Comments)     Abdominal pain    Misoprostol Other (See Comments)     Abdominal pain  Abdominal pain      Naproxen Other (See Comments)     Abdominal pain  Abdominal pain       Pioglitazone Other (See Comments)    Simvastatin Other (See Comments)    Trazodone Other (See Comments)    Trazodone Hcl Other (See Comments)    Actos [Pioglitazone Hydrochloride] Nausea Only    Bactrim Nausea Only    Cephalexin Itching    Ezetimibe-Simvastatin Nausea Only and Other (See Comments)    Lisinopril Nausea Only and Other (See Comments)    Trazodone And Nefazodone Nausea Only     No current facility-administered medications on file prior to encounter.       Current Outpatient Medications on File Prior to Encounter   Medication Sig Dispense Refill    nitrofurantoin, macrocrystal-monohydrate, (MACROBID) 100 MG capsule Take 100 mg by mouth 2 times daily      Cetirizine HCl (ZYRTEC ALLERGY PO) Take 1 tablet by mouth daily      Olopatadine HCl (PAZEO) 0.7 % SOLN Apply 1 drop to eye as needed      senna-docusate (PERICOLACE) 8.6-50 MG per tablet Take 2 tablets by mouth as needed for Constipation      sodium chloride (OCEAN, BABY AYR) 0.65 % nasal spray 2 sprays by Nasal route as needed for Congestion      Insulin Degludec (TRESIBA FLEXTOUCH SC) Inject 32 Units into the skin every morning      Insulin Pen Needle 32G X 4 MM MISC Use as directed for insulin administration      ferrous sulfate (SLOW FE) 142 (45 Fe) MG extended release tablet Take 1 tablet by mouth daily       sodium bicarbonate 650 MG tablet Take 1 tablet by mouth daily (Patient taking differently: Take 650 mg by mouth daily Two tablets daily) 90 tablet 1    atorvastatin (LIPITOR) 80 MG tablet Take 80 mg by mouth nightly       venlafaxine (EFFEXOR XR) 150 MG extended release capsule Take 150 mg by mouth nightly       nebivolol (BYSTOLIC) 5 MG tablet Take 5 mg by mouth daily       alprazolam (XANAX) 0.5 MG tablet Take 0.5 mg by mouth 3 times daily as needed.  aspirin 81 MG EC tablet Take 81 mg by mouth daily.  clopidogrel (PLAVIX) 75 MG tablet Take 75 mg by mouth daily. family history includes Diabetes in her sister.   Past Medical History:   Diagnosis Date    Arthritis     Blind left eye     CAD (coronary artery disease)     Chronic back pain     Diabetes mellitus ([address]     Hearing loss     both ears    Hyperlipidemia     Hypertension     PONV (postoperative nausea and vomiting)      Past Medical History:   Diagnosis Date    Arthritis     Blind left eye     CAD (coronary artery disease)     Chronic back pain     Diabetes mellitus ([address]     Hearing loss     both ears    Hyperlipidemia     Hypertension     PONV (postoperative nausea and vomiting)      Active Ambulatory Problems     Diagnosis Date Noted    CAD (coronary artery disease) 05/05/2011    Hypertension 05/05/2011    Hyperlipidemia 05/05/2011    PVD (peripheral vascular disease) ([address] 05/05/2011    Tobacco abuse 11/21/2011    Otorrhea of right ear 07/28/2017    Chronic eczematous otitis externa of right ear 08/07/2017    JHONNY (acute kidney injury) ([address] 04/30/2019    Hyperkalemia 04/30/2019    CKD (chronic kidney disease), stage III ([address] 10/22/2019     Resolved Ambulatory Problems     Diagnosis Date Noted    No Resolved Ambulatory Problems     Past Medical History:   Diagnosis Date    Arthritis     Blind left eye     Chronic back pain     Diabetes mellitus ([address]     Hearing loss     PONV (postoperative nausea and vomiting)        Review of Systems:  Gen: No dizziness, fevers, chills  HEENT: Negative for double vision, visual changes  CV: Negative for chest pain, palpitations  Lungs: Negative for shortness of breath or wheezing  Abdomen: Negative for abdominal pain or bloating   Neurological: Negative for numbness, paresthesias, or weakness  Psychiatric: Displaying appropriate mentation, judgement, and decision making  Urological: Negative for urinary retention  Skin: warm, well perfused, dressing in place over hip    Vital Signs:  Vitals:    02/05/20 0712   BP: 106/66   Pulse: 74   Resp: 16   Temp: 97.8 °F (36.6 °C)   SpO2:      Height: 5' 7\" (170.2 cm), Weight: 193 lb (87.5 kg), Body mass index is 30.23 kg/m². ,      Physical Exam:     Appearance: Joe Hernandez is alert, oriented x 3, resting comforably, no acute distress. Left Hip Exam:  Dressing is clean, dry, and intact. Compartments are soft and compressible. Flexion and extension is intact although limited due to post operative status. Motor intact with knee flexion, knee extension, dorsiflexion, EHL, and plantar flexion. Sensation intact on the dorsal and plantar aspect of the foot. Negative Babak's sign. Compression stockings in place. +DP/PT pulse, foot warm with brisk capillary refill < 2 seconds. Assessment:  1). S/p Left Total hip arthroplasty  2). Postop urinary retention    Treatment Plan:    1). Maintain dressing  2). Pain control PRN  3). DVT ppx  4). Ice/elevate PRN  5). Encourage diet and fluids  6). GI ppx  7). PT/OT, increase activity as tolerated per protocol, TTWB-hip precautions based on approach   8). Nutritional support  9).  IM recommendations  10)Start Flomax, Urology recs    Disposition: Anticipate discharge home w/ home health care pending today or tomorrow patient progression        Adin Antoine DO  Clinical Fellow  [facility]

## 2020-02-05 NOTE — PROGRESS NOTES
when she gets out of hospital)  Type of Home: [address]: One level(Does not go into basement)  Home Access: Stairs to enter with rails(2 BETSEY in front; 1 BETSEY in back)  Entrance Stairs - Number of Steps: 2  Bathroom Shower/Tub: Walk-in shower  Bathroom Toilet: Standard  Bathroom Equipment: Shower chair  Home Equipment: Rolling walker(Adjustable bed)  ADL Assistance: [address]: Independent  Homemaking Responsibilities: No( does cooking and cleaning )  Ambulation Assistance: Independent  Transfer Assistance: Independent  Active : No  Occupation: Retired  Leisure & Hobbies: Watch TV         Objective          AROM RLE (degrees)  RLE AROM: WFL  AROM LLE (degrees)  LLE AROM : WFL(functional AROM observed through transfers and gait)     Strength RLE  Strength RLE: WFL  Strength LLE  Strength LLE: (hip flexion 3+/5, knee extension (quad) 2/5, DF/PF 4+/5; tested in sitting)     Sensation  Overall Sensation Status: Impaired  Light Touch: Partial deficits in the LLE(reports she feels increased sensation in her L3 dermatome L vs R; \"I can feel it more\" - asked if that meant she could feel it more than usual but she said no it feels a little extra sensitive)     Bed mobility  Supine to Sit: Contact guard assistance(Verbal cueing for legs and hand placement )  Scooting: Contact guard assistance(To the EOB and in chair )     Transfers  Sit to Stand:  Moderate Assistance;Maximum Assistance(max x1 mod x1 with verbal and tactile cues to push off bed and then reach for RW; vc to stand up straight)  Stand to sit: Minimal Assistance(min x2 with vc to reach back for armrests of chair)   Comments: pt is TTWB and put L foot on therapist foot to try to maintain toe touch    Ambulation  Ambulation?: Yes  Ambulation 1  Surface: level tile  Device: Rolling Walker  Assistance: Minimal assistance(min A with vc for gait pattern and TTWB precautions)  Quality of Gait: slow yina, hopping gait (required due to TTWB restrictions), decreased hop height and length, LOB posterior  Distance: 1x3' ft  Comments: pt is TTWB on L; pt requires constant verbal and tactile cues to maintain TTWB status and gait pattern - pt placed L foot on PT foot to try to maintain TTWB precautions; spO2 post gait 96% on RA - pt verbalizes she was fatigued and demo HUNT     Balance  Posture: Good  Sitting - Static: Good  Sitting - Dynamic: Good  Standing - Static: Poor(TTWB; requires RW for stability)  Standing - Dynamic: Poor(TTWB; requires RW for stability)  Comments: pt is TTWB on R - demo ability to maintain with tactile cues (PT foot under L foot);   Exercises  Ankle Pumps: x10     Plan   Plan  Times per week: 7  Twice a day  Current Treatment Recommendations: Strengthening, Transfer Training, Endurance Training, ROM, Balance Training, Gait Training, Functional Mobility Training, Stair training, Home Exercise Program  Safety Devices  Type of devices: Chair alarm in place, Call light within reach, Patient at risk for falls, Left in chair, Nurse notified      AM-PAC Score  AM-PAC Inpatient Mobility Raw Score : 14 (02/05/20 1345)  AM-PAC Inpatient T-Scale Score : 38.1 (02/05/20 1345)  Mobility Inpatient CMS 0-100% Score: 61.29 (02/05/20 1345)  Mobility Inpatient CMS G-Code Modifier : CL (02/05/20 1345)          Goals  Short term goals  Time Frame for Short term goals: d/c  Short term goal 1: perform sit <> stand with min A using RW maintaining TTWB precautions  Short term goal 2: ambulate 8' with CGA using RW maintaining TTWB precautions  Short term goal 3: pt verbalizes anterior hip precautions correctly  Short term goal 4: up/down one platform step with walker min assist TTWB  Patient Goals   Patient goals : return home       Therapy Time   Individual Concurrent Group Co-treatment   Time In 1107         Time Out 1200         Minutes 53           Timed Code Treatment Minutes: 43    Total Treatment Minutes: [id number] [facility],

## 2020-02-05 NOTE — OP NOTE
[facility] [place] [address], [address]                                OPERATIVE REPORT    PATIENT NAME: Nadine Pineda                  :        1940  MED REC NO:   [id number]                          ROOM:       5526  ACCOUNT NO:   [de-identified]                           ADMIT DATE: 2020  PROVIDER:     Ken Ozuna MD    DATE OF PROCEDURE:  2020    PREOPERATIVE DIAGNOSIS:  Left hip osteoarthritis. POSTOPERATIVE DIAGNOSIS:  Left hip osteoarthritis. OPERATION PERFORMED:  Left total hip arthroplasty using Synergy stem  REDAPT acetabular shell 0 degree 54 liner, 36 head/12 stem. There were  four locking screws in the REDAPT cup 35 mm and 25 mm locking screws and  a 20 mm fixation screw. SURGEON:  Ken Ozuna MD    ASSISTANT:  Nancy Coats DO    NOTE:  This is a 70-year-old female with end-stage left hip  osteoarthritis. She had only about 30 degrees total hip rotation and  was bone-on-bone with little medial protrusion. She had been through  intraarticular injections, oral antiinflammatory medication and physical  therapy and continued to have pain. After discussion of risks, benefits  and alternatives; this patient wished to proceed with surgery. OPERATIVE PROCEDURE:  The patient was transferred to the operating room  after fascia iliaca block was placed, general anesthesia was induced. The patient was placed on a Vac-Pac in the lateral decubitus position  and the hip was prepped and draped in sterile fashion including covering  all skin with sterile incise drape. A lateral approach was undertaken,  it was carried down through the skin and very copious subcutaneous  tissue. The IT band and gluteal fascia was split and retracted. Anterior third of gluteus medius was elevated and retracted the gluteus  minimus, was elevated and retracted.   An anterior superior and partial  inferior

## 2020-02-05 NOTE — PLAN OF CARE
Problem: Falls - Risk of:  Goal: Will remain free from falls  Description  Will remain free from falls  2/5/2020 1740 by Sameer Portillo RN  Outcome: Ongoing   All fall precautions in place. Bed locked and in lowest position with alarm on. Over-bed table and personal belongings within reach. Patient instructed to use call light for assistance. Non-skids socks on. Will continue to monitor. Problem: Pain - Acute:  Goal: Pain level will decrease  Description  Pain level will decrease  Outcome: Ongoing   Patient denies pain at this time. Will continue to assess and monitor.

## 2020-02-05 NOTE — PROGRESS NOTES
Patient alert and oriented x4, VSS, neuro at baseline. Dressing to L hip is clean, dry & intact. Patient denies pain except with movement and reports pain is well controlled with scheduled pain medication. Patient yet to void - bladder scan for 237 mL. Per urology, do not straight cath for less than 450 mL. Patient tolerating PO fluids and meals well and denies N/V. All fall precautions in place. Will continue to monitor.

## 2020-02-05 NOTE — PROGRESS NOTES
Physical Therapy  Facility/Department: [facility] 5T ORTHO/NEURO  Daily Treatment Note  NAME: Arnold Rubinstein  : 1940  MRN: [id number]    Date of Service: 2020    Discharge Recommendations: Arnold Rubinstein scored a 14/24 on the AM-PAC short mobility form. Current research shows that an AM-PAC score of 17 or less is typically not associated with a discharge to the patient's home setting. Based on the patients AM-PAC score and their current functional mobility deficits, it is recommended that the patient have 3-5 sessions per week of Physical Therapy at d/c to increase the patients independence. PT Equipment Recommendations  Equipment Needed: (defer)    Assessment   Assessment: Pt tolerated ANGELES LE HEP without increased pain but verbalizes some discomfort/soreness. Pt able to demo most ANGELES LE HEP exercises x10 indep with vc but unable to perform SAQ or quad set + SLR. Pt verbalizes she is feeling her LLE more and that she thinks her nerve block is wearing off. Pt unable to recall anterior hip precautions and required re-education. Pt current mobility continues to be below her reported baseline with safety concerns about returning home. Will follow up in am 2. Pt verbalizes understanding that she may have to go to a facility prior to home but doesn't want to if she doesn't have to. If home, pt will benefit from 24 hr assist initially,. use of RW at all times, and home PT. Pt will benefit from continued skilled IP PT. Treatment Diagnosis: mobility impairment due to s/p elective L ANGELES  Decision Making: Low Complexity  PT Education: PT Role;Transfer Training;Goals;Precautions; Adaptive Device Training;Weight-bearing Education;Home Exercise Program;Plan of Care;General Safety;Gait Training  Patient Education: pt educated on anterior hip precautions, pt educated on ANGELES LE HEP - pt verbalized understanding  REQUIRES PT FOLLOW UP: Yes  Activity Tolerance  Activity Tolerance: Patient Tolerated treatment well;Patient limited by endurance     Patient Diagnosis(es): There were no encounter diagnoses. has a past medical history of Arthritis, Blind left eye, CAD (coronary artery disease), Chronic back pain, Diabetes mellitus ([address] Hearing loss, Hyperlipidemia, Hypertension, and PONV (postoperative nausea and vomiting). has a past surgical history that includes Coronary angioplasty with stent; Colonoscopy; epidural steroid injection (Bilateral, 2/27/2019); epidural steroid injection (N/A, 3/27/2019); lumbar nerve block (N/A, 8/14/2019); eye surgery (Right); and Total hip arthroplasty (Left, 2/4/2020). Restrictions  Position Activity Restriction  Other position/activity restrictions: TTWB, anterior hip precautions, L LE nerve block 2/4     Subjective   General  Chart Reviewed: Yes  Additional Pertinent Hx: Pt was admitted 2/4/20 s/p elective L ANGELES. PMHx: PONV, HTN, DM, CAD, coronary stent placement x2, L4-5 nerve block 8/19, current smoker. L LE peripheral nerve block 2/4. No changes in physical exam since 1/13. EKG 1/16 cleared pt for surgery. Family / Caregiver Present: Yes(, daughter)  Subjective  Subjective: Pt found supine and agreeable to PT.  \"I really don't want to get up unless I have to\"  Pain Screening  Patient Currently in Pain: Denies         Orientation  Orientation  Overall Orientation Status: Within Normal Limits(oriented through conversation)       Objective   Exercises  Ankle Pumps: x10  Comments: ANGELES LE HEP x10 indep with most; required assist due to pt unable to extend knee off bed during SAQ or lift L LE off bed during quad set + SLR; sitting knee extension/flexion not attempted d/t pt unable to extend knee off bed during SAQ in supine    Strength RLE  Strength RLE: [facility]  Strength LLE  Strength LLE: (pt unable to extend knee against gravity with SAQ; pt unable to lift LLE off bed)       AM-PAC Score  AM-PAC Inpatient Mobility Raw Score : 14 (02/05/20 3125)  AM-PAC Inpatient T-Scale

## 2020-02-05 NOTE — CONSULTS
CALCIUM 7.9 02/05/2020    GFRAA 26 02/05/2020    LABGLOM 22 02/05/2020     PT/INR:    Lab Results   Component Value Date    PROTIME 12.0 01/23/2020    INR 1.03 01/23/2020     PTT:    Lab Results   Component Value Date    APTT 34.7 01/23/2020   [APTT    Urinalysis: unremarkable       Antibiotic Therapy:  No new     Imaging: no     Impression/Plan: 77 yo F with POD#1 s/p hip surgery, now with post-op urinary retention.     -Cr @ 2. 2. will check in AM  -continue flomax  -bladder scan Q 6 hours, okay to in and out cath for PVR >450cc  -will follow     SIMI Cm - CNP

## 2020-02-05 NOTE — PROGRESS NOTES
VSS. A/Ox4. NV intact with exception to inability to walk d/t block post op. Pt dangled at bedside and stood at bedside with two nurses and walker and GB but was unable to take steps. Pt can feel the extremity being touched however but block is still in effect. Pt yet to void from pacu. Pt had 417 ml in bladder at bladder scan. Will recheck with the hour to see if pt can void. Will straight cath if otherwise. Dressing CDI. Minh hose on. SCD on. Bed alarm set. Call light in reach. Will continue to monitor. 0425: Pt straight cath'd using sterile technique per orders. Pt was only able to void 50 cc on her own prior to this. Block still in effect.

## 2020-02-05 NOTE — PROGRESS NOTES
Vision: Impaired(Blind in R eye)  Vision Exceptions: Wears glasses at all times  Hearing: Exceptions to [facility]  Hearing Exceptions: Bilateral hearing aid    Orientation  Overall Orientation Status: Within Functional Limits  Observation/Palpation  Posture: Fair  Balance  Sitting Balance: Contact guard assistance  Standing Balance: Dependent/Total(Mod x2)  Standing Balance  Time: 1 min x2  Activity: Functional transfers; Bed to Chair to [facility]  Functional Mobility  Functional - Mobility Device: Rolling Walker  Activity: (Bed to chair to [facility])  Assist Level: Dependent/Total(Mod x2)  Functional Mobility Comments: Tactile and verbal cues required to adhere to TTWB staus on LLE  Toilet Transfers  Toilet - Technique: Stand pivot  Equipment Used: Standard bedside commode  Toilet Transfer: 2 Person assistance(Mod x2)  ADL  Feeding: Setup(To drink while seated )  Grooming: Setup(To comb hair and wipe face with washcloth while seated in the chair )  LE Dressing: Maximum assistance(Pt attmpted; could not adhere to precautions )  Tone RUE  RUE Tone: Normotonic  Tone LUE  LUE Tone: Normotonic  Coordination  Movements Are Fluid And Coordinated: Yes     Bed mobility  Supine to Sit: Contact guard assistance(Verbal cueing for legs and hand placement )  Scooting: Contact guard assistance(To the EOB and in chair )  Transfers  Stand Pivot Transfers: 2 Person assistance(Max x2)  Sit to stand: 2 Person assistance(Max + Mod from bed. Mod x2 from chair.  Mod x2 from [facility])  Stand to sit: Moderate assistance(verbal cues for hand placement )  Transfer Comments: Tactile and verbal cues required to adhere to TTWB staus on LLE     Cognition  Overall Cognitive Status: WFL        Sensation  Overall Sensation Status: Impaired  Light Touch: Partial deficits in the LLE(reports she feels increased sensation in her L3 dermatome L vs R; \"I can feel it more\" - asked if that meant she could feel it more than usual but she said no it feels a little extra

## 2020-02-06 LAB
ANION GAP SERPL CALCULATED.3IONS-SCNC: 13 MMOL/L (ref 3–16)
BUN BLDV-MCNC: 49 MG/DL (ref 7–20)
CALCIUM SERPL-MCNC: 8 MG/DL (ref 8.3–10.6)
CHLORIDE BLD-SCNC: 101 MMOL/L (ref 99–110)
CO2: 19 MMOL/L (ref 21–32)
CREAT SERPL-MCNC: 2.3 MG/DL (ref 0.6–1.2)
GFR AFRICAN AMERICAN: 25
GFR NON-AFRICAN AMERICAN: 20
GLUCOSE BLD-MCNC: 143 MG/DL (ref 70–99)
GLUCOSE BLD-MCNC: 163 MG/DL (ref 70–99)
GLUCOSE BLD-MCNC: 189 MG/DL (ref 70–99)
GLUCOSE BLD-MCNC: 198 MG/DL (ref 70–99)
GLUCOSE BLD-MCNC: 215 MG/DL (ref 70–99)
HCT VFR BLD CALC: 22.6 % (ref 36–48)
HEMOGLOBIN: 7.5 G/DL (ref 12–16)
MCH RBC QN AUTO: 30.3 PG (ref 26–34)
MCHC RBC AUTO-ENTMCNC: 33.3 G/DL (ref 31–36)
MCV RBC AUTO: 91.2 FL (ref 80–100)
PDW BLD-RTO: 13.7 % (ref 12.4–15.4)
PERFORMED ON: ABNORMAL
PLATELET # BLD: 153 K/UL (ref 135–450)
PMV BLD AUTO: 7.8 FL (ref 5–10.5)
POTASSIUM REFLEX MAGNESIUM: 4.7 MMOL/L (ref 3.5–5.1)
RBC # BLD: 2.48 M/UL (ref 4–5.2)
SODIUM BLD-SCNC: 133 MMOL/L (ref 136–145)
WBC # BLD: 11 K/UL (ref 4–11)

## 2020-02-06 PROCEDURE — 6370000000 HC RX 637 (ALT 250 FOR IP): Performed by: ORTHOPAEDIC SURGERY

## 2020-02-06 PROCEDURE — 6370000000 HC RX 637 (ALT 250 FOR IP): Performed by: INTERNAL MEDICINE

## 2020-02-06 PROCEDURE — 97535 SELF CARE MNGMENT TRAINING: CPT

## 2020-02-06 PROCEDURE — 51701 INSERT BLADDER CATHETER: CPT

## 2020-02-06 PROCEDURE — 1200000000 HC SEMI PRIVATE

## 2020-02-06 PROCEDURE — 80048 BASIC METABOLIC PNL TOTAL CA: CPT

## 2020-02-06 PROCEDURE — 85027 COMPLETE CBC AUTOMATED: CPT

## 2020-02-06 PROCEDURE — 97530 THERAPEUTIC ACTIVITIES: CPT

## 2020-02-06 PROCEDURE — 36415 COLL VENOUS BLD VENIPUNCTURE: CPT

## 2020-02-06 PROCEDURE — 51702 INSERT TEMP BLADDER CATH: CPT

## 2020-02-06 PROCEDURE — 97116 GAIT TRAINING THERAPY: CPT

## 2020-02-06 PROCEDURE — 97110 THERAPEUTIC EXERCISES: CPT

## 2020-02-06 PROCEDURE — 51798 US URINE CAPACITY MEASURE: CPT

## 2020-02-06 PROCEDURE — 2580000003 HC RX 258: Performed by: ORTHOPAEDIC SURGERY

## 2020-02-06 RX ORDER — POLYETHYLENE GLYCOL 3350 17 G/17G
17 POWDER, FOR SOLUTION ORAL 2 TIMES DAILY
Status: DISCONTINUED | OUTPATIENT
Start: 2020-02-06 | End: 2020-02-07 | Stop reason: HOSPADM

## 2020-02-06 RX ORDER — SODIUM BICARBONATE 650 MG/1
650 TABLET ORAL 2 TIMES DAILY
Status: DISCONTINUED | OUTPATIENT
Start: 2020-02-06 | End: 2020-02-07 | Stop reason: HOSPADM

## 2020-02-06 RX ADMIN — SENNOSIDES AND DOCUSATE SODIUM 1 TABLET: 8.6; 5 TABLET ORAL at 09:31

## 2020-02-06 RX ADMIN — POLYETHYLENE GLYCOL 3350 17 G: 17 POWDER, FOR SOLUTION ORAL at 13:30

## 2020-02-06 RX ADMIN — BENZOCAINE AND MENTHOL 1 LOZENGE: 15; 3.6 LOZENGE ORAL at 04:26

## 2020-02-06 RX ADMIN — OXYCODONE 10 MG: 5 TABLET ORAL at 21:08

## 2020-02-06 RX ADMIN — BENZOCAINE AND MENTHOL 1 LOZENGE: 15; 3.6 LOZENGE ORAL at 09:32

## 2020-02-06 RX ADMIN — TAMSULOSIN HYDROCHLORIDE 0.4 MG: 0.4 CAPSULE ORAL at 09:31

## 2020-02-06 RX ADMIN — INSULIN HUMAN 2 UNITS: 100 INJECTION, SOLUTION PARENTERAL at 11:45

## 2020-02-06 RX ADMIN — ALPRAZOLAM 0.5 MG: 0.5 TABLET ORAL at 09:36

## 2020-02-06 RX ADMIN — Medication 10 ML: at 09:32

## 2020-02-06 RX ADMIN — SENNOSIDES AND DOCUSATE SODIUM 1 TABLET: 8.6; 5 TABLET ORAL at 21:08

## 2020-02-06 RX ADMIN — INSULIN HUMAN 2 UNITS: 100 INJECTION, SOLUTION PARENTERAL at 06:50

## 2020-02-06 RX ADMIN — OXYCODONE 5 MG: 5 TABLET ORAL at 13:29

## 2020-02-06 RX ADMIN — NEBIVOLOL HYDROCHLORIDE 5 MG: 5 TABLET ORAL at 09:31

## 2020-02-06 RX ADMIN — ALPRAZOLAM 0.5 MG: 0.5 TABLET ORAL at 21:17

## 2020-02-06 RX ADMIN — ATORVASTATIN CALCIUM 80 MG: 80 TABLET, FILM COATED ORAL at 21:08

## 2020-02-06 RX ADMIN — ACETAMINOPHEN 650 MG: 325 TABLET ORAL at 11:44

## 2020-02-06 RX ADMIN — CETIRIZINE HYDROCHLORIDE 5 MG: 10 TABLET, FILM COATED ORAL at 09:31

## 2020-02-06 RX ADMIN — NITROFURANTOIN (MONOHYDRATE/MACROCRYSTALS) 100 MG: 75; 25 CAPSULE ORAL at 21:08

## 2020-02-06 RX ADMIN — DOCUSATE SODIUM 100 MG: 100 CAPSULE, LIQUID FILLED ORAL at 09:31

## 2020-02-06 RX ADMIN — INSULIN HUMAN 4 UNITS: 100 INJECTION, SOLUTION PARENTERAL at 21:09

## 2020-02-06 RX ADMIN — POLYETHYLENE GLYCOL 3350 17 G: 17 POWDER, FOR SOLUTION ORAL at 21:08

## 2020-02-06 RX ADMIN — NITROFURANTOIN (MONOHYDRATE/MACROCRYSTALS) 100 MG: 75; 25 CAPSULE ORAL at 09:31

## 2020-02-06 RX ADMIN — VENLAFAXINE HYDROCHLORIDE 150 MG: 150 CAPSULE, EXTENDED RELEASE ORAL at 21:08

## 2020-02-06 RX ADMIN — OXYCODONE 5 MG: 5 TABLET ORAL at 03:36

## 2020-02-06 RX ADMIN — ACETAMINOPHEN 650 MG: 325 TABLET ORAL at 17:10

## 2020-02-06 RX ADMIN — INSULIN GLARGINE 32 UNITS: 100 INJECTION, SOLUTION SUBCUTANEOUS at 06:50

## 2020-02-06 RX ADMIN — CLOPIDOGREL BISULFATE 75 MG: 75 TABLET ORAL at 09:31

## 2020-02-06 RX ADMIN — ASPIRIN 81 MG: 81 TABLET, COATED ORAL at 21:08

## 2020-02-06 RX ADMIN — Medication 10 ML: at 21:08

## 2020-02-06 RX ADMIN — ACETAMINOPHEN 650 MG: 325 TABLET ORAL at 06:52

## 2020-02-06 RX ADMIN — ASPIRIN 81 MG: 81 TABLET, COATED ORAL at 09:36

## 2020-02-06 RX ADMIN — DOCUSATE SODIUM 100 MG: 100 CAPSULE, LIQUID FILLED ORAL at 21:08

## 2020-02-06 RX ADMIN — SODIUM BICARBONATE 650 MG: 650 TABLET ORAL at 21:08

## 2020-02-06 ASSESSMENT — PAIN SCALES - GENERAL
PAINLEVEL_OUTOF10: 1
PAINLEVEL_OUTOF10: 6
PAINLEVEL_OUTOF10: 2
PAINLEVEL_OUTOF10: 2
PAINLEVEL_OUTOF10: 10
PAINLEVEL_OUTOF10: 4
PAINLEVEL_OUTOF10: 2
PAINLEVEL_OUTOF10: 4

## 2020-02-06 ASSESSMENT — PAIN DESCRIPTION - ORIENTATION
ORIENTATION: LEFT

## 2020-02-06 ASSESSMENT — PAIN DESCRIPTION - ONSET
ONSET: ON-GOING

## 2020-02-06 ASSESSMENT — PAIN - FUNCTIONAL ASSESSMENT
PAIN_FUNCTIONAL_ASSESSMENT: PREVENTS OR INTERFERES SOME ACTIVE ACTIVITIES AND ADLS
PAIN_FUNCTIONAL_ASSESSMENT: ACTIVITIES ARE NOT PREVENTED

## 2020-02-06 ASSESSMENT — PAIN DESCRIPTION - FREQUENCY
FREQUENCY: INTERMITTENT
FREQUENCY: INTERMITTENT
FREQUENCY: CONTINUOUS
FREQUENCY: INTERMITTENT

## 2020-02-06 ASSESSMENT — PAIN DESCRIPTION - LOCATION
LOCATION: HIP

## 2020-02-06 ASSESSMENT — PAIN DESCRIPTION - PROGRESSION
CLINICAL_PROGRESSION: GRADUALLY WORSENING
CLINICAL_PROGRESSION: NOT CHANGED

## 2020-02-06 ASSESSMENT — PAIN DESCRIPTION - PAIN TYPE
TYPE: ACUTE PAIN;SURGICAL PAIN
TYPE: SURGICAL PAIN

## 2020-02-06 ASSESSMENT — PAIN DESCRIPTION - DESCRIPTORS
DESCRIPTORS: SORE
DESCRIPTORS: DISCOMFORT;ACHING
DESCRIPTORS: SORE
DESCRIPTORS: DISCOMFORT;SORE

## 2020-02-06 NOTE — PROGRESS NOTES
Historical Provider, MD   Cetirizine HCl (ZYRTEC ALLERGY PO) Take 1 tablet by mouth daily   Yes Historical Provider, MD   Olopatadine HCl (PAZEO) 0.7 % SOLN Apply 1 drop to eye as needed   Yes Historical Provider, MD   senna-docusate (Katcrissy Reese) 8.6-50 MG per tablet Take 2 tablets by mouth as needed for Constipation   Yes Historical Provider, MD   sodium chloride (OCEAN, BABY AYR) 0.65 % nasal spray 2 sprays by Nasal route as needed for Congestion   Yes Historical Provider, MD   Insulin Degludec (TRESIBA FLEXTOUCH SC) Inject 32 Units into the skin every morning   Yes Historical Provider, MD   Insulin Pen Needle 32G X 4 MM MISC Use as directed for insulin administration 11/4/19  Yes Historical Provider, MD   ferrous sulfate (SLOW FE) 142 (45 Fe) MG extended release tablet Take 1 tablet by mouth daily    Yes Historical Provider, MD   sodium bicarbonate 650 MG tablet Take 1 tablet by mouth daily  Patient taking differently: Take 650 mg by mouth daily Two tablets daily 10/2/19  Yes Lorna Sandoval MD   atorvastatin (LIPITOR) 80 MG tablet Take 80 mg by mouth nightly    Yes Historical Provider, MD   venlafaxine (EFFEXOR XR) 150 MG extended release capsule Take 150 mg by mouth nightly    Yes Historical Provider, MD   nebivolol (BYSTOLIC) 5 MG tablet Take 5 mg by mouth daily    Yes Historical Provider, MD   alprazolam (XANAX) 0.5 MG tablet Take 0.5 mg by mouth 3 times daily as needed. 8/19/10  Yes Historical Provider, MD   montelukast (SINGULAIR) 10 MG tablet Take 10 mg by mouth nightly as needed     Historical Provider, MD   clopidogrel (PLAVIX) 75 MG tablet Take 75 mg by mouth daily. Historical Provider, MD       Allergies:  Cephalexin; Doxycycline; Levofloxacin; Levofloxacin; Sulfa antibiotics; Ezetimibe; Metformin; Misoprostol; Naproxen; Pioglitazone; Simvastatin; Trazodone; Trazodone hcl; Actos [pioglitazone hydrochloride];  Bactrim; Cephalexin; Ezetimibe-simvastatin; Lisinopril; and Trazodone and nefazodone    Social History:      The patient currently lives at home with family    TOBACCO:   reports that she has been smoking e-cigarettes. She has been smoking about 0.25 packs per day. She has never used smokeless tobacco.  ETOH:   reports no history of alcohol use. Family History:      Reviewed in detail and negative for DM, CAD, Cancer, CVA. Positive as follows: Mother positive for HTN        Problem Relation Age of Onset    Diabetes Sister        REVIEW OF SYSTEMS:   Pertinent positives as noted in the HPI. All other systems reviewed and negative. PHYSICAL EXAM PERFORMED:  BP (!) 111/58 Comment: nurse notified  Pulse 60   Temp 97.3 °F (36.3 °C) (Oral)   Resp 16   Ht 5' 7\" (1.702 m)   Wt 193 lb (87.5 kg)   SpO2 96%   BMI 30.23 kg/m²   General appearance: No apparent distress, appears stated age and cooperative. HEENT: Normal cephalic, atraumatic without obvious deformity. Pupils equal, round, and reactive to light. Extra ocular muscles intact. Conjunctivae/corneas clear. Neck: Supple, with full range of motion. No jugular venous distention. Trachea midline. Respiratory:  Normal respiratory effort. Clear to auscultation, bilaterally without Rales/Wheezes/Rhonchi. Cardiovascular: Regular rate and rhythm with normal S1/S2 without murmurs, rubs or gallops. Abdomen: Soft, non-tender, non-distended with normal bowel sounds. Musculoskeletal: Left hip arthroplasty surgical area covered in dressing, no clubbing, cyanosis or edema bilaterally. Skin: Skin color, texture, turgor normal.  No rashes or lesions. Neurologic:  Neurovascularly intact without any focal sensory/motor deficits.  Cranial nerves: II-XII intact, grossly non-focal.  Psychiatric: Alert and oriented, thought content appropriate, normal insight  Capillary Refill: Brisk,< 3 seconds   Peripheral Pulses: +2 palpable, equal bilaterally     Labs:     Recent Labs     02/05/20  0516 02/06/20  0558   WBC 10.0 11.0   HGB 8.1* 7.5*   HCT to CKD and Iron deficeincy--  Left ANGELES with EBL 300ml.   Metabolic acidosis - on Na Bicarb tabs at home  Diabetes mellitus  Hx of CAD on ASA, Plavix  Hypertension - recent increase in bystolic to 5 mg OD and she has been feeling lightheaded at home since then      Plan:  On IVF 50mL/hr  US Renal with right renal cyst and mild right renal atrophy  Hgb down to 7.5, no signs of active bleeding and hemodynamically stable  Insulin sliding scale  Stop Bystolic for now with low blood pressures  Increase bicarb to bid  Continue aspirin, statin, Plavix  continue flomax  Place armenta catheter  voiding trial in 3-4 days as outpatient urology office  Continue senna, add miralax  Transfuse for Hgb < 7.0    Diet: DIET CARB CONTROL;  Code Status: Full Code    PT/OT Eval Status: 14/24; NWB and would benefit from SNF on dc    Disposition: not medically ready, Cr elevated from baseline, and low BP    Thank you for the consultation, will follow up as needed    Elmo Rodríguez MD  Hospitalist

## 2020-02-06 NOTE — PLAN OF CARE
Problem: Falls - Risk of:  Goal: Will remain free from falls  Description  Will remain free from falls  2/6/2020 0137 by Giuseppe Lyons RN  Outcome: Met This Shift   Pt free from injury this shift and free of falls. 2/4 rails up on bed and bed is in the lowest position. Wheels locked and bed alarm set. Socks on pt and ID bands on pt. Call light in reach of pt and pt educated to call out to get up x2 sally steady, toe touch WB. Will continue to monitor for safety. Problem: Pain - Acute:  Goal: Pain level will decrease  Description  Pain level will decrease  2/6/2020 0137 by Giuseppe Lyons RN  Outcome: Met This Shift   Pain controlled with Po tylenol and block still in effect. Pt educated to call out for pain 4/10 or greater.

## 2020-02-06 NOTE — CONSULTS
Pt seen and Examined     Nephrology Consult Note                                                                                                                                                                                                                                                                                                                                                               Office : [phone]     Fax :[phone]              Patient's Name: Sowmya Huber  4:14 PM  2/7/2020    Reason for Consult:  *JHONNY   Requesting Physician:  Halima Shepherd DO      Chief Complaint:  Hip sx     History of Present Ilness:    Sowmya Huber is a 78 y.o. female with PMH of CKD stage III, DM, HLD, HTN, and CAD who is  s/p left total hip arthroplasty. Pt has a baseline Cr of 1.6-1.7, but has been experiencing an increasing Cr since surgery, it was 2.3 as of yesterday morning. Pt was bradycardic and normotensive in the OR, and has had episodes of hypotension post-op. She states that her blood pressure is rarely hypertensive at home, and she has had episodes of hypotension since her medication was changed at the end of January.    She felt dizzy with bystolic       Past Medical History:   Diagnosis Date    Arthritis     Blind left eye     CAD (coronary artery disease)     Chronic back pain     Diabetes mellitus ([address]     Hearing loss     both ears    Hyperlipidemia     Hypertension     PONV (postoperative nausea and vomiting)        Past Surgical History:   Procedure Laterality Date    COLONOSCOPY      CORONARY ANGIOPLASTY WITH STENT PLACEMENT      2 stents     EPIDURAL STEROID INJECTION Bilateral 2/27/2019    BILATERAL L4 TRANSFORAMINAL EPIDURAL STEROID INJECTION WITH FLUOROSCOPY performed by Ruth Lewis MD at [facility] N/A 3/27/2019    L4/L5 INTERLAMINAR EPIDURAL STEROID INJECTION WITH FLUOROSCOPY performed by Ruth Lewis MD at [address] [place] [address] Right     PROSTHETIC EYE     LUMBAR NERVE BLOCK N/A 8/14/2019    MIDLINE L4L5 INTERLAMINAR EPIDURAL STEROID INJECTION WITH FLUOROSCOPY performed by Denae Post MD at [address] Left 2/4/2020    LEFT TOTAL HIP ARTHROPLASTY performed by Ayana Bermudez MD at [facility] OR       Family History   Problem Relation Age of Onset    Diabetes Sister         reports that she has been smoking e-cigarettes. She has been smoking about 0.25 packs per day. She has never used smokeless tobacco. She reports that she does not drink alcohol or use drugs. Allergies:  Cephalexin; Doxycycline; Levofloxacin; Levofloxacin; Sulfa antibiotics; Ezetimibe; Metformin; Misoprostol; Naproxen; Pioglitazone; Simvastatin; Trazodone; Trazodone hcl; Actos [pioglitazone hydrochloride];  Bactrim; Cephalexin; Ezetimibe-simvastatin; Lisinopril; and Trazodone and nefazodone    Current Medications:    sodium bicarbonate tablet 650 mg, BID  polyethylene glycol (GLYCOLAX) packet 17 g, BID  benzocaine-menthol (CEPACOL SORE THROAT) lozenge 1 lozenge, Q2H PRN  tamsulosin (FLOMAX) capsule 0.4 mg, Daily  ALPRAZolam (XANAX) tablet 0.5 mg, TID PRN  atorvastatin (LIPITOR) tablet 80 mg, Nightly  cetirizine (ZYRTEC) tablet 5 mg, Daily  clopidogrel (PLAVIX) tablet 75 mg, Daily  ferrous sulfate (SLOW FE) extended release tablet 142 mg, Nightly  Olopatadine HCl 0.7 % SOLN 1 drop, PRN  sennosides-docusate sodium (SENOKOT-S) 8.6-50 MG tablet 2 tablet, Daily PRN  venlafaxine (EFFEXOR XR) extended release capsule 150 mg, Nightly  sodium chloride flush 0.9 % injection 10 mL, 2 times per day  sodium chloride flush 0.9 % injection 10 mL, PRN  acetaminophen (TYLENOL) tablet 650 mg, Q6H  docusate sodium (COLACE) capsule 100 mg, BID  sennosides-docusate sodium (SENOKOT-S) 8.6-50 MG tablet 1 tablet, BID  magnesium hydroxide (MILK OF MAGNESIA) 400 MG/5ML suspension 30 mL, Daily PRN  ondansetron (ZOFRAN) injection 4 mg, Q6H PRN  oxyCODONE PO2ART, [id number] in the last 72 hours. INR: No results for input(s): INR in the last 72 hours. UA:No results for input(s): [place], GLUCOSEU, BILIRUBINUR, Willetta Ballinger, BLOODU, PHUR, PROTEINU, UROBILINOGEN, NITRU, LEUKOCYTESUR, LABMICR, URINETYPE in the last 72 hours. Urine Microscopic: No results for input(s): LABCAST, BACTERIA, COMU, HYALCAST, WBCUA, RBCUA, EPIU in the last 72 hours. Urine Culture: No results for input(s): LABURIN in the last 72 hours. Urine Chemistry:   Recent Labs     02/07/20  0623   NAUR 104             IMAGING:  US RENAL COMPLETE   Final Result   IMPRESSION :      Simple appearing right renal cyst.      Mild right renal atrophy. XR HIP 2-3 VW W PELVIS LEFT   Final Result   1. Left hip arthroplasty in anatomic alignment on AP projection. FLUORO FOR SURGICAL PROCEDURES   Final Result   1. Fluoroscopy during hip arthroplasty as described. XR HIP LEFT (1 VIEW)   Final Result      Fluoroscopic utilization for left hip arthroplasty.               JHONNY on CKD 3   S/p Hip sx   Ur retention     Plan   - Cont armenta   - IVF for 24 hours   - Stop Bystolic as BP low   - Ur studies   - labs in am   - d/w primary team             Thank you for allowing us to participate in care of [address] free to contact me   [facility] [address]  Office : [phone]  Fax :[phone]

## 2020-02-06 NOTE — CARE COORDINATION
Case Management Assessment           Initial Evaluation                Date / Time of Evaluation: 2/5/2020 8:24 PM                 Assessment Completed by: Kuldeep Daily     Spoke with patient regarding discharge needs. Pt is from home with her spouse and was hoping to return to home at d/c but per therapy it would not be a safe discharge plan at this time. Pt is hoping that tomorrow will be better and pt can return to home with home care. We did discuss the possibility of her needing a SNF at d/c and I gave her the AQL list to review. No precert needed if a SNF is needed. I asked her to review the list and we would discuss it again tomorrow and if she did need to go to a facility then I would like about 3 or so places to make referrals to. Patient Name: Ramirez Chandler     YOB: 1940  Diagnosis: Primary osteoarthritis of left hip [M16.12]  Status post total hip replacement, left [Z96.642]     Date / Time: 2/4/2020  7:46 AM    Patient Admission Status: Inpatient    If patient is discharged prior to next notation, then this note serves as note for discharge by case management.      Current PCP: Monserrat Drew DO  Clinic Patient: No    Chart Reviewed: Yes  Patient/ Family Interviewed: Yes    Initial assessment completed at bedside with: patient    Hospitalization in the last 30 days: No    Emergency Contacts:  Extended Emergency Contact Information  Primary Emergency Contact: Saleem Babcock  Address: [address] [place] [address], [address] Elmer Torres of [address] [place] [address] Phone: [phone]  Relation: Spouse  Secondary Emergency Contact: [place] [address] [facility] Phone: [phone]  Relation: Child    Advance Directives:   Code Status: Full [address]: No  Agent: NA  Contact Number: NA      Financial  Payor: Deedee Lewis / Plan: MEDICARE PART A AND B / Product Type: *No Product type* /     Pre-cert required for SNF: No    Pharmacy    [address] [facility] [address] [facility], [address] [place] [address]   Phone: [phone] Fax: [phone]      Potential assistance Purchasing Medications: Potential Assistance Purchasing Medications: No  Does Patient want to participate in local refill/ meds to beds program?: Yes    Meds To Beds General Rules:  1. Can ONLY be done Monday- Friday between 8:30am-5pm  2. Prescription(s) must be in pharmacy by 3pm to be filled same day  3. Copy of patient's insurance/ prescription drug card and patient face sheet must be sent along with the prescription(s)  4. Cost of Rx cannot be added to hospital bill. If financial assistance is needed, please contact unit  or ;  or  CANNOT provide pharmacy voucher for patients co-pays  5.  Patients can then  the prescription on their way out of the hospital at discharge, or pharmacy can deliver to the bedside if staff is available. (payment due at time of pick-up or delivery - cash, check, or card accepted)     Able to afford home medications/ co-pay costs: Yes    ADLS  Support Systems: Spouse/Significant Other, Children, Family Members, [employer]    PT AM-PAC: 14 /24  OT AM-PAC: 13 /24    [place]: one level home  Steps:2    Plans to RETURN to current housing: Yes  Barriers to RETURNING to current housing: mobility needs to improve      DISCHARGE PLAN:  Disposition: TBD  Transportation PLAN for discharge: TBD     Factors facilitating achievement of predicted outcomes: Family support, Cooperative and Pleasant    Barriers to discharge: steps, mobility    Additional Case Management Notes: NA    The Plan for Transition of Care is related to the following treatment goals of Primary osteoarthritis of left hip [M16.12]  Status post total hip replacement, left [A81.620]    The Patient and/or patient representative Jewel Hardin and her family were provided with a choice of provider and agrees with the discharge plan Yes    Freedom of choice list was provided with basic dialogue that supports the patient's individualized plan of care/goals and shares the quality data associated with the providers.  Yes      Care Transition patient: No    Faustino Negro RN  [facility]  Case Management Department  Ph: [phone]   Fax: [phone]

## 2020-02-06 NOTE — PROGRESS NOTES
Physical Therapy  Facility/Department: [facility] 5T ORTHO/NEURO  Daily Treatment Note  NAME: Lashae Valiente  : 1940  MRN: [id number]    Date of Service: 2020    Discharge Recommendations: Lashae Valiente scored a 16/24 on the AM-PAC short mobility form. Current research shows that an AM-PAC score of 17 or less is typically not associated with a discharge to the patient's home setting. Based on the patients AM-PAC score and their current functional mobility deficits, it is recommended that the patient have 3-5 sessions per week of Physical Therapy at d/c to increase the patients independence. PT Equipment Recommendations  Equipment Needed: Yes  Mobility Devices: Alyne Rideau: Standard    Assessment   Assessment: Pt demos improved in functional ability and strength since  with transfers and exercises. Pt only able to verbalize 2 out of 5 anterior hip precautions without cueing. Pt unable to maintain TTWB status - educated and practiced NWB during gait and transfers. Pt demo and verbalized increased stability and mobility with SW vs RW. Pt mobility continues to present below her reported baseline. Pt verbalizes she really wants to go home and became emotional when discussing potentially considering a facility upon d/c vs. home - discussed safety concerns about going home with current functional ability and other options for assist with going home (w/c). Will follow up in pm. If home, pt will benefit from 24 hr assist initially, use of SW at all times, and home PT. Pt will benefit from continued skilled IP PT. Treatment Diagnosis: mobility impairment due to s/p elective L ANGELES  PT Education: PT Role;Transfer Training;Goals;Precautions; Adaptive Device Training;Weight-bearing Education;Home Exercise Program;Plan of Care;General Safety;Gait Training  Patient Education: pt re-educated on anterior hip precautions, pt re-educated on ANGELES LE HEP, pt educated on NWB strategies - pt needs reinforcement REQUIRES PT FOLLOW UP: Yes  Activity Tolerance  Activity Tolerance: Patient Tolerated treatment well;Patient limited by endurance     Patient Diagnosis(es): The encounter diagnosis was Status post total hip replacement, left.     has a past medical history of Arthritis, Blind left eye, CAD (coronary artery disease), Chronic back pain, Diabetes mellitus ([address] Hearing loss, Hyperlipidemia, Hypertension, and PONV (postoperative nausea and vomiting). has a past surgical history that includes Coronary angioplasty with stent; Colonoscopy; epidural steroid injection (Bilateral, 2/27/2019); epidural steroid injection (N/A, 3/27/2019); lumbar nerve block (N/A, 8/14/2019); eye surgery (Right); and Total hip arthroplasty (Left, 2/4/2020). Restrictions  Position Activity Restriction  Other position/activity restrictions: Chart says SAMUEL - RN says ortho surgeon told her TTWB and has been trying to get it changed in the chart for past 2 days, anterior hip precautions, LLE nerve block 2/4     Subjective   General  Chart Reviewed: Yes  Additional Pertinent Hx: Pt was admitted 2/4/20 s/p elective L ANGELES. PMHx: PONV, HTN, DM, CAD, coronary stent placement x2, L4-5 nerve block 8/19, current smoker. L LE peripheral nerve block 2/4. No changes in physical exam since 1/13. EKG 1/16 cleared pt for surgery. Family / Caregiver Present: No  Subjective  Subjective: Pt found sitting and agreeable to PT. \"My happy place is my home. \"  Pain Screening  Patient Currently in Pain: Denies(reports soreness - RN aware)       Orientation  Orientation  Overall Orientation Status: Within Normal Limits       Objective   Bed mobility  Supine to Sit: (required min A progressing to CGA to support back; verbal cues to move LEs)  Sit to Supine: Minimal assistance(vc and Yulia to lift LEs)  Scooting: Stand by assistance(at EOB and in chair)     Transfers  Sit to Stand: Minimal Assistance(min A x1; performed x4; PT placed foot under pt L foot to ensure pt didn't put weight through L foot during transfer;  educated and practiced NWB due to inability to  maintain TTWB consistently - pt able to maintain NWB; required multiple vc to push off bed/chair; required vc to put L LE out in front during transfer    Stand to sit: Contact guard assistance(performed x4; required multiple vc to reach back for bed/chair; pt unable to consistently maintain TTWB L so attempted NWB L with max vc; pt unable to maintain NWB L consistently  vitals after sit to stand: /36, HR 70, spO2 97% - RN aware        Ambulation  Ambulation?: Yes  More Ambulation?: Yes  Ambulation 1  Surface: level tile  Device: Rolling Walker  Assistance: Minimal assistance(min A with vc for gait pattern and NWB strategy-  (pt unable to maintain TTWB L)   Quality of Gait: slow yina, hopping gait pattern to maintain NWB on L foot - pt unable to maintain NWB, decreased hop height and length, unsteadiness and LOB posterior with hopping  Distance: 1x4' - two hops forward, two hops back    Ambulation 2  Surface - 2: level tile  Device 2: Standard Walker  Assistance 2: Contact guard assistance  Quality of Gait 2: slow yina, hopping gait pattern to maintain NWB on L foot - pt able to maintain NWB, decreased hop height and length, increased steadiness and hopping ability with SW vs RW  Distance: 2x5  Comments: pt verbalized she felt more stable with SW and using NWB tactic of flexing knee and keeping foot behind her, demo HUNT and pallor - vitals in sitting EOB: /57, HR 75, spO2 98% - RN aware     Balance  Posture: Good  Sitting - Static: Good  Sitting - Dynamic: Good  Standing - Static: Fair(see comments)  Standing - Dynamic: Fair(see comments)    Exercises  Comments: ANGELES LE HEP x10 requiring vc and minimal instruction how to perform exercises; required min A with supine hip ABD and SLR        AM-PAC Score  AM-PAC Inpatient Mobility Raw Score : 16 (02/06/20 1216)  AM-PAC Inpatient T-Scale Score : 40.78

## 2020-02-06 NOTE — PROGRESS NOTES
Urology Attending Progress Note      Subjective: no complaints     Vitals:  /65   Pulse 60   Temp 98.2 °F (36.8 °C) (Oral)   Resp 16   Ht 5' 7\" (1.702 m)   Wt 193 lb (87.5 kg)   SpO2 92%   BMI 30.23 kg/m²   Temp  Av.6 °F (37 °C)  Min: 98.2 °F (36.8 °C)  Max: 99 °F (37.2 °C)    Intake/Output Summary (Last 24 hours) at 2020 1016  Last data filed at 2020 0858  Gross per 24 hour   Intake 630 ml   Output 1450 ml   Net -820 ml       Exam: abd soft and non-tender. Urine clear on in and out cath     Labs:  WBC:    Lab Results   Component Value Date    WBC 11.0 2020     Hemoglobin/Hematocrit:    Lab Results   Component Value Date    HGB 7.5 2020    HCT 22.6 2020     BMP:    Lab Results   Component Value Date     2020    K 4.7 2020     2020    CO2 19 2020    BUN 49 2020    LABALBU 4.0 2020    CREATININE 2.3 2020    CALCIUM 8.0 2020    GFRAA 25 2020    LABGLOM 20 2020     PT/INR:    Lab Results   Component Value Date    PROTIME 12.0 2020    INR 1.03 2020     PTT:    Lab Results   Component Value Date    APTT 34.7 2020   [APTT    Urinalysis: unremarkable       Antibiotic Therapy:  None     Imaging: ANGEL 20  IMPRESSION :       Simple appearing right renal cyst.       Mild right renal atrophy.    no hydronephrosis            Impression/Plan:  79 yo F with POD#1 s/p hip surgery, now with post-op urinary retention.      -she is voiding small amounts. Still requiring CIC for ~800-1300cc  -Cr continues to trend up @ 2.3 today from 1.8.  Will follow renal function in AM  -continue flomax  -will replace armenta  -voiding trial in 3-4 days either here or as outpatient     SIMI Garcia - CNP

## 2020-02-06 NOTE — PROGRESS NOTES
alarm in place;Nurse notified       Restrictions  Position Activity Restriction  Other position/activity restrictions: TTWB, anterior hip precautions, LLE nerve block 2/4  Subjective   General  Chart Reviewed: Yes  Patient assessed for rehabilitation services?: Yes  Additional Pertinent Hx: Pt recieved anterolateral total L hip anthroplasty 2/4/2020. PMHx includes arthritis, blind L eye, CAD, diabetes mellitus, hyperlipidemia, HTN. Family / Caregiver Present: No  Referring Practitioner: Dr. Brennan Petit   Diagnosis: Primary Osteoarthritis of L hip  Subjective  Subjective: Pt sitting in chair upon arrival and agreeable to therapy. Vital Signs  Patient Currently in Pain: Denies   Orientation  Orientation  Overall Orientation Status: Within Functional Limits  Objective    ADL  Feeding: Setup(To drink while seated in bed )  Grooming: Setup(To wipe face with washcloth while seated in chair )  LE Dressing: Moderate assistance(To thread legs through pants using the reacher )  Toileting: (Denied need)        Balance  Sitting Balance: Contact guard assistance  Standing Balance: Dependent/Total(Min x2)  Standing Balance  Time: 1 min   Activity: Functional transfers;  Chair to bed   Functional Mobility  Functional - Mobility Device: Rolling Walker  Activity: (Chair to bed)  Assist Level: Dependent/Total(Min x2)  Bed mobility  Sit to Supine: Minimal assistance(Min A for LE )  Scooting: Stand by assistance(Pt appeared to be SOB after lying sit to supine and had a hard time initiating scooting in the bed)  Transfers  Stand Step Transfers: 2 Person assistance(Min a x2)  Sit to stand: 2 Person assistance(Min x2)  Stand to sit: Minimal assistance  Transfer Comments: Pt did well adhering to TTWB status on LLE                       Cognition  Overall Cognitive Status: WFL                       LUE AROM (degrees)  LUE AROM : WFL  RUE AROM (degrees)  RUE AROM : WFL                 Plan   Plan  Times per week: 7  Times per day:

## 2020-02-06 NOTE — PROGRESS NOTES
VSS. Pain controlled d/t to block. Pt voided 25 cc and had 225 cc post void residual. We are to straight cath for residuals exceeding 450 cc. Will bladder scan again this AM and straight cath if necessary. NV intact. Dressing CDI. SCds on. Pt up to chair for first two hours of the shift, taken to restroom and back to bed via sally steady. Pt is toe touch WB. Bed alarm set. Call light in reach. Will continue to monitor. 3717: Pt voided 100 cc but had >999 on bladder scan after that. Pt was straight cath'd using sterile technique with return of 1300 ml.  Pt to receive another dose of flomax this AM.

## 2020-02-06 NOTE — PROGRESS NOTES
Orthopaedic Surgery Fellow Post Operative ANGELES Rounding Progress Note    History of Present Illness:  Navjot Ellsworth is a 78 y.o. female who was seen this morning. There were no events overnight. Pain is controlled. They currently deny any dizziness, fevers, chills, shortness of breath, chest pain, paresthesias, or any other current complaints. Patient had to be straight cathed last night, no other complaints. She states she would like go home if possible and has lots of support including her , son and his wife. Medical History:  Patient's medications, allergies, past medical, surgical, social and family histories were reviewed and updated as appropriate. They are as noted.      Social History     Socioeconomic History    Marital status:      Spouse name: Not on file    Number of children: Not on file    Years of education: Not on file    Highest education level: Not on file   Occupational History    Not on file   Social Needs    Financial resource strain: Not on file    Food insecurity:     Worry: Not on file     Inability: Not on file    Transportation needs:     Medical: Not on file     Non-medical: Not on file   Tobacco Use    Smoking status: Current Every Day Smoker     Packs/day: 0.25     Types: E-Cigarettes    Smokeless tobacco: Never Used    Tobacco comment: vape not cigarettes   Substance and Sexual Activity    Alcohol use: No    Drug use: No    Sexual activity: Not on file   Lifestyle    Physical activity:     Days per week: Not on file     Minutes per session: Not on file    Stress: Not on file   Relationships    Social connections:     Talks on phone: Not on file     Gets together: Not on file     Attends Latter-day service: Not on file     Active member of club or organization: Not on file     Attends meetings of clubs or organizations: Not on file     Relationship status: Not on file    Intimate partner violence:     Fear of current or ex partner: Not on file  ferrous sulfate (SLOW FE) 142 (45 Fe) MG extended release tablet Take 1 tablet by mouth daily       sodium bicarbonate 650 MG tablet Take 1 tablet by mouth daily (Patient taking differently: Take 650 mg by mouth daily Two tablets daily) 90 tablet 1    atorvastatin (LIPITOR) 80 MG tablet Take 80 mg by mouth nightly       venlafaxine (EFFEXOR XR) 150 MG extended release capsule Take 150 mg by mouth nightly       nebivolol (BYSTOLIC) 5 MG tablet Take 5 mg by mouth daily       alprazolam (XANAX) 0.5 MG tablet Take 0.5 mg by mouth 3 times daily as needed.  clopidogrel (PLAVIX) 75 MG tablet Take 75 mg by mouth daily. family history includes Diabetes in her sister.   Past Medical History:   Diagnosis Date    Arthritis     Blind left eye     CAD (coronary artery disease)     Chronic back pain     Diabetes mellitus ([address]     Hearing loss     both ears    Hyperlipidemia     Hypertension     PONV (postoperative nausea and vomiting)      Past Medical History:   Diagnosis Date    Arthritis     Blind left eye     CAD (coronary artery disease)     Chronic back pain     Diabetes mellitus ([address]     Hearing loss     both ears    Hyperlipidemia     Hypertension     PONV (postoperative nausea and vomiting)      Active Ambulatory Problems     Diagnosis Date Noted    CAD (coronary artery disease) 05/05/2011    Hypertension 05/05/2011    Hyperlipidemia 05/05/2011    PVD (peripheral vascular disease) ([address] 05/05/2011    Tobacco abuse 11/21/2011    Otorrhea of right ear 07/28/2017    Chronic eczematous otitis externa of right ear 08/07/2017    JHONNY (acute kidney injury) ([address] 04/30/2019    Hyperkalemia 04/30/2019    CKD (chronic kidney disease), stage III ([address] 10/22/2019     Resolved Ambulatory Problems     Diagnosis Date Noted    No Resolved Ambulatory Problems     Past Medical History:   Diagnosis Date    Arthritis     Blind left eye     Chronic back pain     Diabetes mellitus ([address]     Hearing loss     PONV (postoperative nausea and vomiting)        Review of Systems:  Gen: No dizziness, fevers, chills  HEENT: Negative for double vision, visual changes  CV: Negative for chest pain, palpitations  Lungs: Negative for shortness of breath or wheezing  Abdomen: Negative for abdominal pain or bloating   Neurological: Negative for numbness, paresthesias, or weakness  Psychiatric: Displaying appropriate mentation, judgement, and decision making  Urological: Negative for urinary retention  Skin: warm, well perfused, dressing in place over hip    Vital Signs:  Vitals:    02/06/20 0703   BP: 124/65   Pulse: 60   Resp: 16   Temp: 98.2 °F (36.8 °C)   SpO2: 92%     Height: 5' 7\" (170.2 cm), Weight: 193 lb (87.5 kg), Body mass index is 30.23 kg/m². ,      Physical Exam:     Appearance: Alexis Lopez is alert, oriented x 3, resting comforably, no acute distress. Left Hip Exam:  Dressing is clean, dry, and intact. Compartments are soft and compressible. Flexion and extension is intact although limited due to post operative status. Motor intact with knee flexion, knee extension, dorsiflexion, EHL, and plantar flexion. Sensation intact on the dorsal and plantar aspect of the foot. Negative Babak's sign. Compression stockings in place. +DP/PT pulse, foot warm with brisk capillary refill < 2 seconds. Assessment:  1). S/p Left Total hip arthroplasty, surgery date POD 2  2). Postoperative anemia, Hb: 7.5  -Patient has chronic anemia with baseline Hb: 11    Treatment Plan:    1). Maintain dressing  2). Pain control PRN  3). DVT ppx  4). Ice/elevate PRN  5). Encourage diet and fluids  6). GI ppx  7). PT/OT, increase activity as tolerated per protocol, TTWB-hip precautions based on approach   8). Nutritional support  9). IM recommendations  10). Conservative management of postop anemia, vitals have remained stable and patient asymptomatic  11).  Urology recommendations  -continue bladders scans q 6,

## 2020-02-06 NOTE — PROGRESS NOTES
steroid injection (Bilateral, 2/27/2019); epidural steroid injection (N/A, 3/27/2019); lumbar nerve block (N/A, 8/14/2019); eye surgery (Right); and Total hip arthroplasty (Left, 2/4/2020). Restrictions  Position Activity Restriction  Other position/activity restrictions: TTWB (per MD notes/RN report; RN trying x 2 days to get MD to update orders in chart), anterior hip precautions, L LE nerve block 2/4; up with assisst  Subjective   General  Family / Caregiver Present: Yes(spouse, daughter)  Subjective  Subjective: Pt found supine in bed and agreeable to PT. \" I guess I'm going to the nursing home even if I don't want to. \"  Pain Screening  Patient Currently in Pain: Yes(rates hip pain 4/10, RN aware)         Orientation  Orientation  Overall Orientation Status: Within Normal Limits       Objective   Bed mobility  Supine to Sit: Contact guard assistance(HOB up; slow and effortful; pt able to maintain anterior hip precautions with 2 vc)  Scooting: Stand by assistance     Transfers  Sit to Stand: Contact guard assistance(tactile cues for NWB L; pt able to inconsistently maintain)  Stand to sit: Contact guard assistance(pt able to maintain NWB L with vc)     Ambulation 1  Device: Standard Walker  Assistance: Minimal assistance  Quality of Gait: pt able to hop on R LE with vc for NWB L; pt fatigued quickly and all movement slow and effortful; posterior lean inconsistently  Distance: 4 steps to chair                                  AM-PAC Score  AM-PAC Inpatient Mobility Raw Score : 15 (02/06/20 1613)  -PAC Inpatient T-Scale Score : 39.45 (02/06/20 1613)  Mobility Inpatient CMS 0-100% Score: 57.7 (02/06/20 1613)  Mobility Inpatient CMS G-Code Modifier : CK (02/06/20 1613)          Goals  Short term goals  Time Frame for Short term goals: d/c  Short term goal 1: perform sit <> stand with min A using RW maintaining TTWB precautions - met 2/6; new goal 2/6: perform sit <> stand with SBA using SW maintaining NWB L LE  Short term goal 2: ambulate 8' with CGA using RW maintaining TTWB precautions - not met; updated goal 2/6: ambulate 10' with CGA using SW maintaining NWB L LE  Short term goal 3: pt verbalizes anterior hip precautions correctly - ongoing  Short term goal 4: ANGELES HEP x10 indep - ongoing  Short term goal 5: up/down one step with Yulia using RW maintaining TTWB - not met; updated goal 2/6: up/down one step with Yulia using SW maintaining NWB L LE  Patient Goals   Patient goals : return home    Plan    Plan  Times per week: 7  Times per day: Twice a day  Current Treatment Recommendations: Strengthening, Transfer Training, Endurance Training, ROM, Balance Training, Gait Training, Functional Mobility Training, Stair training, Home Exercise Program  Safety Devices  Type of devices: Call light within reach, Patient at risk for falls, Left in chair, Nurse notified(pt reclined)     Therapy Time   Individual Concurrent Group Co-treatment   Time In 1425         Time Out 1455         Minutes 30           Timed Code Treatment Minutes: 30      Total Treatment Minutes:  30    This note will serve as a discharge summary if patient is discharged from hospital before next treatment session.        Peg Muniz, PT

## 2020-02-06 NOTE — CARE COORDINATION
Case Management Assessment           Daily Note                 Date/ Time of Note: 2/6/2020 5:32 PM         Note completed by: Daniel Gardner    Patient Name: Natalie Damian  YOB: 1940    Diagnosis:Primary osteoarthritis of left hip [M16.12]  Status post total hip replacement, left [Z96.642]  Patient Admission Status: Inpatient    Date of Admission:2/4/2020  7:46 AM Length of Stay: 2 GLOS:        Current Plan of Care: Referral to [facility]  ________________________________________________________________________________________  PT AM-PAC: 15 / 24 per last evaluation on: 02/06/2020    OT AM-PAC: 14 / 24 per last evaluation on: 02/06/2020    DME Needs for discharge: NA    ________________________________________________________________________________________  Discharge Plan: SNF: [facility]    Tentative discharge date: 02/07/2020    Current barriers to discharge: 02/07/2020    Referrals completed: SNF: [facility]    Resources/ information provided: Not indicated at this time  ________________________________________________________________________________________  Case Management Notes:Pt understands that she will need a SNF at d/c and asked about going to [facility]. Referral made and left message with Aruna in admissions. Mera Weiss and her family were provided with choice of provider; she and her family are in agreement with the discharge plan.     Care Transition Patient: Olga Lidia Gardner RN  [facility]  Case Management Department  Ph: [phone]  DWB:[phone]

## 2020-02-07 VITALS
WEIGHT: 193 LBS | HEIGHT: 67 IN | TEMPERATURE: 98.3 F | DIASTOLIC BLOOD PRESSURE: 54 MMHG | BODY MASS INDEX: 30.29 KG/M2 | SYSTOLIC BLOOD PRESSURE: 131 MMHG | OXYGEN SATURATION: 95 % | RESPIRATION RATE: 16 BRPM | HEART RATE: 68 BPM

## 2020-02-07 LAB
ALBUMIN SERPL-MCNC: 3.5 G/DL (ref 3.4–5)
ANION GAP SERPL CALCULATED.3IONS-SCNC: 17 MMOL/L (ref 3–16)
BUN BLDV-MCNC: 42 MG/DL (ref 7–20)
CALCIUM SERPL-MCNC: 8.2 MG/DL (ref 8.3–10.6)
CHLORIDE BLD-SCNC: 99 MMOL/L (ref 99–110)
CO2: 19 MMOL/L (ref 21–32)
CREAT SERPL-MCNC: 1.9 MG/DL (ref 0.6–1.2)
CREATININE URINE: 59.1 MG/DL (ref 28–259)
GFR AFRICAN AMERICAN: 31
GFR NON-AFRICAN AMERICAN: 25
GLUCOSE BLD-MCNC: 129 MG/DL (ref 70–99)
GLUCOSE BLD-MCNC: 173 MG/DL (ref 70–99)
GLUCOSE BLD-MCNC: 210 MG/DL (ref 70–99)
GLUCOSE BLD-MCNC: 311 MG/DL (ref 70–99)
HCT VFR BLD CALC: 22.6 % (ref 36–48)
HEMOGLOBIN: 7.6 G/DL (ref 12–16)
MICROALBUMIN UR-MCNC: 3.5 MG/DL
MICROALBUMIN/CREAT UR-RTO: 59.2 MG/G (ref 0–30)
PERFORMED ON: ABNORMAL
PHOSPHORUS: 3.4 MG/DL (ref 2.5–4.9)
POTASSIUM SERPL-SCNC: 4.2 MMOL/L (ref 3.5–5.1)
SODIUM BLD-SCNC: 135 MMOL/L (ref 136–145)
SODIUM URINE: 104 MMOL/L

## 2020-02-07 PROCEDURE — 6370000000 HC RX 637 (ALT 250 FOR IP): Performed by: INTERNAL MEDICINE

## 2020-02-07 PROCEDURE — 97110 THERAPEUTIC EXERCISES: CPT

## 2020-02-07 PROCEDURE — 97116 GAIT TRAINING THERAPY: CPT

## 2020-02-07 PROCEDURE — 84300 ASSAY OF URINE SODIUM: CPT

## 2020-02-07 PROCEDURE — 85018 HEMOGLOBIN: CPT

## 2020-02-07 PROCEDURE — 82043 UR ALBUMIN QUANTITATIVE: CPT

## 2020-02-07 PROCEDURE — 82570 ASSAY OF URINE CREATININE: CPT

## 2020-02-07 PROCEDURE — 97535 SELF CARE MNGMENT TRAINING: CPT

## 2020-02-07 PROCEDURE — 97530 THERAPEUTIC ACTIVITIES: CPT

## 2020-02-07 PROCEDURE — 85014 HEMATOCRIT: CPT

## 2020-02-07 PROCEDURE — 6370000000 HC RX 637 (ALT 250 FOR IP): Performed by: ORTHOPAEDIC SURGERY

## 2020-02-07 PROCEDURE — 36415 COLL VENOUS BLD VENIPUNCTURE: CPT

## 2020-02-07 PROCEDURE — 2580000003 HC RX 258: Performed by: ORTHOPAEDIC SURGERY

## 2020-02-07 PROCEDURE — 80069 RENAL FUNCTION PANEL: CPT

## 2020-02-07 RX ORDER — POLYETHYLENE GLYCOL 3350 17 G/17G
17 POWDER, FOR SOLUTION ORAL 2 TIMES DAILY
Qty: 527 G | Refills: 1 | COMMUNITY
Start: 2020-02-07 | End: 2020-03-08

## 2020-02-07 RX ADMIN — CLOPIDOGREL BISULFATE 75 MG: 75 TABLET ORAL at 08:04

## 2020-02-07 RX ADMIN — ACETAMINOPHEN 650 MG: 325 TABLET ORAL at 06:19

## 2020-02-07 RX ADMIN — INSULIN HUMAN 2 UNITS: 100 INJECTION, SOLUTION PARENTERAL at 12:42

## 2020-02-07 RX ADMIN — SENNOSIDES AND DOCUSATE SODIUM 1 TABLET: 8.6; 5 TABLET ORAL at 08:04

## 2020-02-07 RX ADMIN — DOCUSATE SODIUM 100 MG: 100 CAPSULE, LIQUID FILLED ORAL at 08:04

## 2020-02-07 RX ADMIN — ALPRAZOLAM 0.5 MG: 0.5 TABLET ORAL at 16:16

## 2020-02-07 RX ADMIN — TAMSULOSIN HYDROCHLORIDE 0.4 MG: 0.4 CAPSULE ORAL at 08:04

## 2020-02-07 RX ADMIN — INSULIN HUMAN 8 UNITS: 100 INJECTION, SOLUTION PARENTERAL at 16:40

## 2020-02-07 RX ADMIN — ASPIRIN 81 MG: 81 TABLET, COATED ORAL at 08:04

## 2020-02-07 RX ADMIN — ACETAMINOPHEN 650 MG: 325 TABLET ORAL at 12:52

## 2020-02-07 RX ADMIN — OXYCODONE 5 MG: 5 TABLET ORAL at 08:04

## 2020-02-07 RX ADMIN — CETIRIZINE HYDROCHLORIDE 5 MG: 10 TABLET, FILM COATED ORAL at 08:04

## 2020-02-07 RX ADMIN — Medication 10 ML: at 08:07

## 2020-02-07 RX ADMIN — INSULIN GLARGINE 32 UNITS: 100 INJECTION, SOLUTION SUBCUTANEOUS at 07:37

## 2020-02-07 RX ADMIN — SODIUM BICARBONATE 650 MG: 650 TABLET ORAL at 08:04

## 2020-02-07 ASSESSMENT — PAIN SCALES - GENERAL
PAINLEVEL_OUTOF10: 6
PAINLEVEL_OUTOF10: 0
PAINLEVEL_OUTOF10: 2
PAINLEVEL_OUTOF10: 0
PAINLEVEL_OUTOF10: 7

## 2020-02-07 ASSESSMENT — PAIN DESCRIPTION - PROGRESSION
CLINICAL_PROGRESSION: GRADUALLY WORSENING
CLINICAL_PROGRESSION: GRADUALLY WORSENING

## 2020-02-07 ASSESSMENT — PAIN - FUNCTIONAL ASSESSMENT: PAIN_FUNCTIONAL_ASSESSMENT: PREVENTS OR INTERFERES SOME ACTIVE ACTIVITIES AND ADLS

## 2020-02-07 ASSESSMENT — PAIN DESCRIPTION - PAIN TYPE: TYPE: ACUTE PAIN;SURGICAL PAIN

## 2020-02-07 ASSESSMENT — PAIN DESCRIPTION - ORIENTATION: ORIENTATION: LEFT

## 2020-02-07 ASSESSMENT — PAIN DESCRIPTION - FREQUENCY: FREQUENCY: CONTINUOUS

## 2020-02-07 ASSESSMENT — PAIN DESCRIPTION - LOCATION: LOCATION: HIP

## 2020-02-07 ASSESSMENT — PAIN DESCRIPTION - ONSET: ONSET: ON-GOING

## 2020-02-07 ASSESSMENT — PAIN DESCRIPTION - DESCRIPTORS: DESCRIPTORS: DISCOMFORT;SORE

## 2020-02-07 NOTE — PROGRESS NOTES
Orthopaedic Surgery Fellow Post Operative ANGELES Rounding Progress Note    History of Present Illness:  Arnold Rubinstein is a 78 y.o. female who was seen this morning. There were no events overnight. Pain is controlled. They currently deny any dizziness, fevers, chills, shortness of breath, chest pain, paresthesias, or any other current complaints. Medical History:  Patient's medications, allergies, past medical, surgical, social and family histories were reviewed and updated as appropriate. They are as noted.      Social History     Socioeconomic History    Marital status:      Spouse name: Not on file    Number of children: Not on file    Years of education: Not on file    Highest education level: Not on file   Occupational History    Not on file   Social Needs    Financial resource strain: Not on file    Food insecurity:     Worry: Not on file     Inability: Not on file    Transportation needs:     Medical: Not on file     Non-medical: Not on file   Tobacco Use    Smoking status: Current Every Day Smoker     Packs/day: 0.25     Types: E-Cigarettes    Smokeless tobacco: Never Used    Tobacco comment: vape not cigarettes   Substance and Sexual Activity    Alcohol use: No    Drug use: No    Sexual activity: Not on file   Lifestyle    Physical activity:     Days per week: Not on file     Minutes per session: Not on file    Stress: Not on file   Relationships    Social connections:     Talks on phone: Not on file     Gets together: Not on file     Attends Restorationist service: Not on file     Active member of club or organization: Not on file     Attends meetings of clubs or organizations: Not on file     Relationship status: Not on file    Intimate partner violence:     Fear of current or ex partner: Not on file     Emotionally abused: Not on file     Physically abused: Not on file     Forced sexual activity: Not on file   Other Topics Concern    Not on file   Social History Narrative    Not on file       Allergies   Allergen Reactions    Cephalexin Other (See Comments)     Other reaction(s): Skin Rash    Doxycycline Nausea And Vomiting and Rash     Nausea and vomiting      Levofloxacin Hives    Levofloxacin Other (See Comments)    Sulfa Antibiotics Other (See Comments) and Nausea Only     Bactrim      Ezetimibe Other (See Comments)    Metformin Other (See Comments)     Abdominal pain    Misoprostol Other (See Comments)     Abdominal pain  Abdominal pain      Naproxen Other (See Comments)     Abdominal pain  Abdominal pain       Pioglitazone Other (See Comments)    Simvastatin Other (See Comments)    Trazodone Other (See Comments)    Trazodone Hcl Other (See Comments)    Actos [Pioglitazone Hydrochloride] Nausea Only    Bactrim Nausea Only    Cephalexin Itching    Ezetimibe-Simvastatin Nausea Only and Other (See Comments)    Lisinopril Nausea Only and Other (See Comments)    Trazodone And Nefazodone Nausea Only     No current facility-administered medications on file prior to encounter.       Current Outpatient Medications on File Prior to Encounter   Medication Sig Dispense Refill    nitrofurantoin, macrocrystal-monohydrate, (MACROBID) 100 MG capsule Take 100 mg by mouth 2 times daily      Cetirizine HCl (ZYRTEC ALLERGY PO) Take 1 tablet by mouth daily      Olopatadine HCl (PAZEO) 0.7 % SOLN Apply 1 drop to eye as needed      senna-docusate (PERICOLACE) 8.6-50 MG per tablet Take 2 tablets by mouth as needed for Constipation      sodium chloride (OCEAN, BABY AYR) 0.65 % nasal spray 2 sprays by Nasal route as needed for Congestion      Insulin Degludec (TRESIBA FLEXTOUCH SC) Inject 32 Units into the skin every morning      Insulin Pen Needle 32G X 4 MM MISC Use as directed for insulin administration      ferrous sulfate (SLOW FE) 142 (45 Fe) MG extended release tablet Take 1 tablet by mouth daily       sodium bicarbonate 650 MG tablet Take 1 tablet by mouth daily (Patient Negative for chest pain, palpitations  Lungs: Negative for shortness of breath or wheezing  Abdomen: Negative for abdominal pain or bloating   Neurological: Negative for numbness, paresthesias, or weakness  Psychiatric: Displaying appropriate mentation, judgement, and decision making  Urological: Negative for urinary retention  Skin: warm, well perfused, dressing in place over hip    Vital Signs:  Vitals:    02/07/20 0736   BP:    Pulse:    Resp:    Temp: 98.9 °F (37.2 °C)   SpO2:      Height: 5' 7\" (170.2 cm), Weight: 193 lb (87.5 kg), Body mass index is 30.23 kg/m². ,      Physical Exam:     Appearance: Sara Simpson is alert, oriented x 3, resting comforably, no acute distress. Left Hip Exam:  Dressing is clean, dry, and intact. Compartments are soft and compressible. Flexion and extension is intact although limited due to post operative status. Motor intact with knee flexion, knee extension, dorsiflexion, EHL, and plantar flexion. Sensation intact on the dorsal and plantar aspect of the foot. Negative Babak's sign. Compression stockings in place. +DP/PT pulse, foot warm with brisk capillary refill < 2 seconds. Assessment:  1). S/p Left Total hip arthroplasty, POD 3  2). Acute on chronic postop anemia-stable  3). Urinary Retention-armenta cath in place  4). Acute on chronic kidney injury    Treatment Plan:    1). Maintain dressing  2). Pain control PRN  3). DVT ppx  4). Ice/elevate PRN  5). Encourage diet and fluids  6). GI ppx  7). PT/OT, increase activity as tolerated per protocol, TTWB (25%)-hip precautions based on approach   8). Nutritional support  9). IM recommendations  10). Urology Recs  11).  Nephrology recs    Disposition: Anticipate discharge to SNF today pending patient progression        Cecile Yanez, [address] [facility]

## 2020-02-07 NOTE — PROGRESS NOTES
MD   Cetirizine HCl (ZYRTEC ALLERGY PO) Take 1 tablet by mouth daily   Yes Historical Provider, MD   Olopatadine HCl (PAZEO) 0.7 % SOLN Apply 1 drop to eye as needed   Yes Historical Provider, MD   senna-docusate (Jennyfer Young) 8.6-50 MG per tablet Take 2 tablets by mouth as needed for Constipation   Yes Historical Provider, MD   sodium chloride (OCEAN, BABY AYR) 0.65 % nasal spray 2 sprays by Nasal route as needed for Congestion   Yes Historical Provider, MD   Insulin Degludec (TRESIBA FLEXTOUCH SC) Inject 32 Units into the skin every morning   Yes Historical Provider, MD   Insulin Pen Needle 32G X 4 MM MISC Use as directed for insulin administration 11/4/19  Yes Historical Provider, MD   ferrous sulfate (SLOW FE) 142 (45 Fe) MG extended release tablet Take 1 tablet by mouth daily    Yes Historical Provider, MD   sodium bicarbonate 650 MG tablet Take 1 tablet by mouth daily  Patient taking differently: Take 650 mg by mouth daily Two tablets daily 10/2/19  Yes Stephen Stuart MD   atorvastatin (LIPITOR) 80 MG tablet Take 80 mg by mouth nightly    Yes Historical Provider, MD   venlafaxine (EFFEXOR XR) 150 MG extended release capsule Take 150 mg by mouth nightly    Yes Historical Provider, MD   nebivolol (BYSTOLIC) 5 MG tablet Take 5 mg by mouth daily    Yes Historical Provider, MD   alprazolam (XANAX) 0.5 MG tablet Take 0.5 mg by mouth 3 times daily as needed. 8/19/10  Yes Historical Provider, MD   montelukast (SINGULAIR) 10 MG tablet Take 10 mg by mouth nightly as needed     Historical Provider, MD   clopidogrel (PLAVIX) 75 MG tablet Take 75 mg by mouth daily. Historical Provider, MD       Allergies:  Cephalexin; Doxycycline; Levofloxacin; Levofloxacin; Sulfa antibiotics; Ezetimibe; Metformin; Misoprostol; Naproxen; Pioglitazone; Simvastatin; Trazodone; Trazodone hcl; Actos [pioglitazone hydrochloride];  Bactrim; Cephalexin; Ezetimibe-simvastatin; Lisinopril; and Trazodone and nefazodone    Social History:

## 2020-02-07 NOTE — PROGRESS NOTES
Blind left eye, CAD (coronary artery disease), Chronic back pain, Diabetes mellitus ([address] Hearing loss, Hyperlipidemia, Hypertension, and PONV (postoperative nausea and vomiting). has a past surgical history that includes Coronary angioplasty with stent; Colonoscopy; epidural steroid injection (Bilateral, 2/27/2019); epidural steroid injection (N/A, 3/27/2019); lumbar nerve block (N/A, 8/14/2019); eye surgery (Right); and Total hip arthroplasty (Left, 2/4/2020). Restrictions  Position Activity Restriction  Chart says FWBAT - per discussion with Tammie INGRAM on 2/7 pt can use 25% TTWB LLE, anterior hip precautions, LLE nerve block 2/4    Subjective   General  Chart Reviewed: Yes  Additional Pertinent Hx: Pt was admitted 2/4/20 s/p elective L ANGELES. PMHx: PONV, HTN, DM, CAD, coronary stent placement x2, L4-5 nerve block 8/19, current smoker. L LE peripheral nerve block 2/4. No changes in physical exam since 1/13. EKG 1/16 cleared pt for surgery. Family / Caregiver Present: No  Subjective  Subjective: Pt found supine in bed and agreeable to PT.  \"I think I'll have to go to the nursing home\"  General Comment  Comments: Tammie INGRAM talked with patient and PT - pt is allowed to use 25% TTWB LLE as tolerated; reports pt will be 25% TTWB LLE x 2 weeks  Pain Screening  Patient Currently in Pain: (reports soreness at rest, reports 7/10 with going from supine <> sit - RN aware)         Orientation  Orientation  Overall Orientation Status: Within Normal Limits        Objective   Bed mobility  Supine to Sit: Contact guard assistance(HOB flat; slow and effortful; able to maintain anterior hip precautions with vc)     Transfers  Sit to Stand: Contact guard assistance(performed x2; vc to push off bed/chair; vc to maintain NWB LLE - pt able to maintain)  Stand to sit: Contact guard assistance(performed x2; vc to reach back for chair and maintain NWB LLE - pt unable to maintain - required PT to hold LLE out to maintain NWB x1) met; updated goal 2/6: ambulate 10' with CGA using SW maintaining NWB LLE - ongoing  Short term goal 3: pt verbalizes anterior hip precautions correctly - ongoing  Short term goal 4: ANGELES HEP x10 indep - ongoing  Short term goal 5: up/down one step with Yulia using RW maintaining TTWB - not met; updated goal 2/6: up/down one step with Yulia using SW maintaining NWB LLE - ongoing  Patient Goals   Patient goals : return home    2nd Session:   Pt found sitting up in chair upon arrival, denying pain and agreeable to therapy. Ant hip precautions reviewed 3x with pt during session - pt able to remember 2-3/5 precautions each time asked. Pt performing AP, QS, GS, LAQ x10 BLE. Pt performing x5 sit to/from stand transfers with CGA and RW maintaining LLE NWB- number of reps limited by fatigue. Pt left sitting up in chair at end of session with chair alarm on, call light within reach, and  present, RN notified. Performed 2nd session treatment and documentation only. Rossy Alonzo, PT, DPT [id number]     Plan    Plan  Times per week: 7  Times per day: Twice a day  Current Treatment Recommendations: Strengthening, Transfer Training, Endurance Training, ROM, Balance Training, Gait Training, Functional Mobility Training, Stair training, Home Exercise Program  Safety Devices  Type of devices: Call light within reach, Patient at risk for falls, Left in chair, Nurse notified, Chair alarm in place(pt reclined)     Therapy Time   Individual Concurrent Group Co-treatment   Time In 0810         Time Out 0910         Minutes 60           Timed Code Treatment Minutes: 60    Total Treatment Minutes: [address], [place], [place] [address]      Therapist was present, directed patient's care, made skilled judgement and was responsible for assessment and treatment of patient.      Second Session Therapy Time:   Individual Concurrent Group Co-treatment   Time In 1440         Time Out 1506         Minutes 26 Timed Code Treatment Minutes:   26    Total Treatment Minutes:  26      This note will serve as a d/c summary if pt is discharged from hospital before next treatment session.

## 2020-02-07 NOTE — PROGRESS NOTES
Urology Attending Progress Note      Subjective: no complaints     Vitals:  /71   Pulse 69   Temp 98.9 °F (37.2 °C) Comment: after IS  Resp 18   Ht 5' 7\" (1.702 m)   Wt 193 lb (87.5 kg)   SpO2 96%   BMI 30.23 kg/m²   Temp  Av.1 °F (37.3 °C)  Min: 97.3 °F (36.3 °C)  Max: 100.6 °F (38.1 °C)    Intake/Output Summary (Last 24 hours) at 2020 1033  Last data filed at 2020 0541  Gross per 24 hour   Intake 600 ml   Output 2450 ml   Net -1850 ml       Exam: abd soft and non-tender. Armenta draining clear urine     Labs:  WBC:    Lab Results   Component Value Date    WBC 11.0 2020     Hemoglobin/Hematocrit:    Lab Results   Component Value Date    HGB 7.5 2020    HCT 22.6 2020     BMP:    Lab Results   Component Value Date     2020    K 4.2 2020    K 4.7 2020    CL 99 2020    CO2 19 2020    BUN 42 2020    LABALBU 3.5 2020    CREATININE 1.9 2020    CALCIUM 8.2 2020    GFRAA 31 2020    LABGLOM 25 2020     PT/INR:    Lab Results   Component Value Date    PROTIME 12.0 2020    INR 1.03 2020     PTT:    Lab Results   Component Value Date    APTT 34.7 2020   [APTT        Antibiotic Therapy:  None     Imaging: no new       Impression/Plan: 79 yo F with POD#3 s/p hip surgery, now with post-op urinary retention.      -armenta was replaced yesterday for ~1300cc  -Cr @ 1.9 from 2.3  -continue flomax at discharge   -voiding trial in 3-4 days at SNF once stronger.      SIMI Cm - CNP

## 2020-02-07 NOTE — PROGRESS NOTES
Occupational Therapy  Facility/Department: [facility] 5T ORTHO/NEURO  Daily Treatment Note  NAME: Shira Glover  : 1940  MRN: [id number]    Date of Service: 2020    Discharge Recommendations:  Shira Glover scored a 15/24 on the AM-PAC ADL Inpatient form. Current research shows that an AM-PAC score of 18 or greater is typically associated with a discharge to the patient's home setting. Based on the patients AM-PAC score and their current ADL deficits, it is recommended that the patient have 2-3 sessions per week of Occupational Therapy at d/c to increase the patients independence. OT Equipment Recommendations  Equipment Needed: Yes  ADL Assistive Devices: Reacher;Sock-Aid Hard;Long-handled Shoe Horn  Other: defer    Assessment   Performance deficits / Impairments: Decreased functional mobility ; Decreased ADL status; Decreased endurance;Decreased strength;Decreased safe awareness  Assessment: Pt currently functioning below basline. Requires assist for toileting hygiene, use of sally stedy to toilet transfer. Pt with UB exercises to increase strength for transfers/ambulation. Pt would benefit from continued inpt skilled therapy. Continue with POC  Treatment Diagnosis: Decreased functional mobility/independence   Prognosis: Good  OT Education: Transfer Training;Precautions  Patient Education: continue to reinforce  REQUIRES OT FOLLOW UP: Yes  Activity Tolerance  Activity Tolerance: Patient Tolerated treatment well  Safety Devices  Safety Devices in place: Yes  Type of devices: Left in chair;Call light within reach; Chair alarm in place;Nurse notified; All fall risk precautions in place         Patient Diagnosis(es): The encounter diagnosis was Status post total hip replacement, left.      has a past medical history of Arthritis, Blind left eye, CAD (coronary artery disease), Chronic back pain, Diabetes mellitus ([address] Hearing loss, Hyperlipidemia, Hypertension, and PONV (postoperative nausea and vomiting). has a past surgical history that includes Coronary angioplasty with stent; Colonoscopy; epidural steroid injection (Bilateral, 2/27/2019); epidural steroid injection (N/A, 3/27/2019); lumbar nerve block (N/A, 8/14/2019); eye surgery (Right); and Total hip arthroplasty (Left, 2/4/2020). Restrictions  Position Activity Restriction  Other position/activity restrictions: Chart says FWBAT - per discussion with [place] DO on 2/7 pt can use 25% TTWB LLE, anterior hip precautions, LLE nerve block 2/4  Subjective   General  Chart Reviewed: Yes  Patient assessed for rehabilitation services?: Yes  Additional Pertinent Hx: Pt recieved anterolateral total L hip anthroplasty 2/4/2020. PMHx includes arthritis, blind L eye, CAD, diabetes mellitus, hyperlipidemia, HTN. Family / Caregiver Present: Yes( and daughter)  Referring Practitioner: Dr. Ardon Said   Diagnosis: Primary Osteoarthritis of L hip  Subjective  Subjective: Pt sitting in chair upon arrival, pleasant and agreeable to therapy session. General Comment  Comments: Pt requesting to toilet. Pt sit to stand to [facility] Mod A and pt transferred to raised toilet with rails in bathroom. Pt with bowel movement, no brief and dependent for rear hygiene care. Pt in [address] [place] [address] at sink to wash hands. Pt transferred back to recliner, stand to sit Min A. Pt with red theraband performed the following UB exercises: 12 punches right/12 punches left, 12 chest pulls, 12 right/12 left triceps and 12 shoulder lat pull downs.  Call light in reach and chair alarm on  Vital Signs  Patient Currently in Pain: Denies   Orientation  Orientation  Overall Orientation Status: Within Normal Limits  Objective    ADL  Grooming: Setup  Toileting: Dependent/Total(rear hygiene care)        Balance  Standing Balance: (sallybobbi cordero)  Standing Balance  Time: 1 min   Activity: rear hygiene care, transfers via [facility] - Technique: (sally cordero)  Equipment Used: Raised

## 2020-02-07 NOTE — CONSULTS
EYE     LUMBAR NERVE BLOCK N/A 8/14/2019    MIDLINE L4L5 INTERLAMINAR EPIDURAL STEROID INJECTION WITH FLUOROSCOPY performed by Catalino Larsen MD at [address] Left 2/4/2020    LEFT TOTAL HIP ARTHROPLASTY performed by Karen Nash MD at [facility] OR       Family History   Problem Relation Age of Onset    Diabetes Sister         reports that she has been smoking e-cigarettes. She has been smoking about 0.25 packs per day. She has never used smokeless tobacco. She reports that she does not drink alcohol or use drugs. Allergies:  Cephalexin; Doxycycline; Levofloxacin; Levofloxacin; Sulfa antibiotics; Ezetimibe; Metformin; Misoprostol; Naproxen; Pioglitazone; Simvastatin; Trazodone; Trazodone hcl; Actos [pioglitazone hydrochloride];  Bactrim; Cephalexin; Ezetimibe-simvastatin; Lisinopril; and Trazodone and nefazodone    Current Medications:    sodium bicarbonate tablet 650 mg, BID  polyethylene glycol (GLYCOLAX) packet 17 g, BID  benzocaine-menthol (CEPACOL SORE THROAT) lozenge 1 lozenge, Q2H PRN  tamsulosin (FLOMAX) capsule 0.4 mg, Daily  ALPRAZolam (XANAX) tablet 0.5 mg, TID PRN  atorvastatin (LIPITOR) tablet 80 mg, Nightly  cetirizine (ZYRTEC) tablet 5 mg, Daily  clopidogrel (PLAVIX) tablet 75 mg, Daily  ferrous sulfate (SLOW FE) extended release tablet 142 mg, Nightly  Olopatadine HCl 0.7 % SOLN 1 drop, PRN  sennosides-docusate sodium (SENOKOT-S) 8.6-50 MG tablet 2 tablet, Daily PRN  venlafaxine (EFFEXOR XR) extended release capsule 150 mg, Nightly  sodium chloride flush 0.9 % injection 10 mL, 2 times per day  sodium chloride flush 0.9 % injection 10 mL, PRN  acetaminophen (TYLENOL) tablet 650 mg, Q6H  docusate sodium (COLACE) capsule 100 mg, BID  sennosides-docusate sodium (SENOKOT-S) 8.6-50 MG tablet 1 tablet, BID  magnesium hydroxide (MILK OF MAGNESIA) 400 MG/5ML suspension 30 mL, Daily PRN  ondansetron (ZOFRAN) injection 4 mg, Q6H PRN  0.9 % sodium chloride infusion, Continuous  oxyCODONE (ROXICODONE) immediate release tablet 5 mg, Q4H PRN    Or  oxyCODONE (ROXICODONE) immediate release tablet 10 mg, Q4H PRN  aspirin EC tablet 81 mg, BID  insulin regular (HUMULIN R;NOVOLIN R) injection 0-10 Units, 4x Daily AC & HS  dextrose 50 % IV solution, PRN  insulin glargine (LANTUS) injection pen 32 Units, QAM AC  glucose (GLUTOSE) 40 % oral gel 15 g, PRN  dextrose 50 % IV solution, PRN  glucagon (rDNA) injection 1 mg, PRN  dextrose 5 % solution, PRN  prochlorperazine (COMPAZINE) injection 10 mg, Q6H PRN        Review of Systems:   14 point ROS obtained but were negative except mentioned in HPI      Physical exam:     Vitals:  /71   Pulse 69   Temp 98.9 °F (37.2 °C) Comment: after IS  Resp 18   Ht 5' 7\" (1.702 m)   Wt 193 lb (87.5 kg)   SpO2 96%   BMI 30.23 kg/m²   Constitutional:  OAA X3 NAD  Skin: no rash, turgor wnl  Heent:  eomi, mmm  Neck: no bruits or jvd noted  Cardiovascular:  S1, S2 without m/r/g  Respiratory: CTA B without w/r/r  Abdomen: soft, nt, nd  Ext: no lower extremity edema  Psychiatric: mood and affect appropriate  Musculoskeletal:  Rom, muscular strength intact    Data:   Labs:  CBC:   Recent Labs     02/05/20  0516 02/06/20  0558   WBC 10.0 11.0   HGB 8.1* 7.5*    153     BMP:    Recent Labs     02/05/20  0516 02/06/20  0558   * 133*   K 5.6* 4.7    101   CO2 17* 19*   BUN 42* 49*   CREATININE 2.2* 2.3*   GLUCOSE 260* 189*     Ca/Mg/Phos:   Recent Labs     02/05/20  0516 02/06/20  0558   CALCIUM 7.9* 8.0*     Hepatic: No results for input(s): AST, ALT, ALB, BILITOT, ALKPHOS in the last 72 hours. Troponin: No results for input(s): TROPONINI in the last 72 hours. BNP: No results for input(s): BNP in the last 72 hours. Lipids: No results for input(s): CHOL, TRIG, HDL, LDLCALC, LABVLDL in the last 72 hours. ABGs: No results for input(s): PHART, PO2ART, OQO2EZB in the last 72 hours.   INR: No results for input(s): INR in the last 72 hours. UA:No results for input(s): Marsa Santos, GLUCOSEU, BILIRUBINUR, [place] Dicker, BLOODU, PHUR, PROTEINU, UROBILINOGEN, NITRU, LEUKOCYTESUR, LABMICR, URINETYPE in the last 72 hours. Urine Microscopic: No results for input(s): LABCAST, BACTERIA, COMU, HYALCAST, WBCUA, RBCUA, EPIU in the last 72 hours. Urine Culture: No results for input(s): LABURIN in the last 72 hours. Urine Chemistry:   Recent Labs     02/07/20  0623   NAUR 104       IMAGING:  US RENAL COMPLETE   Final Result   IMPRESSION :      Simple appearing right renal cyst.      Mild right renal atrophy. XR HIP 2-3 VW W PELVIS LEFT   Final Result   1. Left hip arthroplasty in anatomic alignment on AP projection. FLUORO FOR SURGICAL PROCEDURES   Final Result   1. Fluoroscopy during hip arthroplasty as described. XR HIP LEFT (1 VIEW)   Final Result      Fluoroscopic utilization for left hip arthroplasty. Assessment  1. JHONNY on CKD stage 3   - Pt Cr has been steadily rising since admission, 2.3 yesterday up from 1.8 on 01/28 when seen pre-op  - Baseline Cr of 1.6-1.7   - She had been on NS at 50 cc/hr post-op, stopped last night     2. Hypertension   - Normotensive to hypotensive at this time   - Hold Bystolic    3. Hyponatremia   - Pt's baseline Na appears to be in the high 130s, 133 this admission      4.  DM  - Blood glucose has been in the high 100-200s since admission   - Last known A1c is 7.3     Plan  - Continue NS at 75 cc/hr   - Encourage PO intake of fluids  - F/u urine studies   - F/u morning labs   - Avoid NSAIDs and ACEs/ARBs  - Continue armenta, voiding trial in 3-4 as per urology        Thank you for allowing us to participate in care of Ginger Mora, MS4 acted as my scribe     Anton Stephens

## 2020-02-07 NOTE — CARE COORDINATION
Received a call from Wellington Werner at [facility] and they do not have a bed available but have one at [facility]. Pt prefers to go to [facility] at d/c. Referral made and no precert needed. If accepted will need HENS and transport arranged.      Electronically signed by Verna Peralta RN on 2/7/2020 at 10:44 AM  [phone]

## 2020-02-08 ENCOUNTER — CARE COORDINATION (OUTPATIENT)
Dept: CASE MANAGEMENT | Age: 80
End: 2020-02-08

## 2020-02-12 ENCOUNTER — CARE COORDINATION (OUTPATIENT)
Dept: CASE MANAGEMENT | Age: 80
End: 2020-02-12

## 2020-02-12 NOTE — DISCHARGE SUMMARY
DISCHARGE SUMMARY  Patient: Natalie Damian MRN: [id number]  [facility] Date:2/4/2020   YOB: 1940  Age: 78 y.o. Sex: female    ? ? ? Discharge Date:  2/7/2020  Admitting Physician  : Mamta Mcdaniels MD   Discharge Physician:  Lesia Nash DO   Admission Diagnoses:  Primary osteoarthritis of left hip [M16.12]  Status post total hip replacement, left [Z96.642]   Discharge Diagnoses:  Primary osteoarthritis of left hip [M16.12]  Status post total hip replacement, left [Z96.642]   Treatments:  LeftTotal Hip Replacement   Hospital Course: This is a 78y.o. year old female with a history of hip DJD that failed conservative treatment. They elected to undergo ANGELES after discussing the risks, benefits, and alternatives to surgery. They underwent the above procedure without complication. Post op their diet was advanced as tolerated. Their pain was controlled with IV transitioning over to PO meds. They were treated for DVT with mechanical means and aspirin. On POD #1 PT started working with the patient. They make good progress and were deemed appropriate for dc on POD #3. They were medically stable during their stay. Consults:   Case Management, Pt, and OT. Disposition:  SNF  Meds:  aspirin for DVT prophylaxis and Percocet 5/325mg for pain control. Patient Instructions:    Activity: Weightbearing as tolerated  Diet: Resume normal diet  Wound Care: Keep Aquacel surgical dressing intact until follow-up appointment  Follow-up with Dr. Jonnie Lujan as previously scheduled   Discharge Condition: Stable  Signed:        Ana Bryant DO  Clinical Fellow  2/12/2020

## 2020-02-12 NOTE — CARE COORDINATION
[facility] Update Call    2020    Patient: Jayson Erazo Patient : 1940   MRN: <[id number]>  Reason for Admission: Rafael Ruaugustus Huang  Discharge Date: 20 RARS: Readmission Risk Score: 24         Care Transitions Post Acute Facility Update    Care Transitions Interventions                          [facility]  Transfer Assistance:  Minimal Assistance   How far (in feet) is the patient ambulating?:  10   Does patient use an assistive device?:  Yes (Comment: 2 wheeled walker)   Barriers to Discharge:  25% Weight bearing restrictions

## 2020-02-12 NOTE — CARE COORDINATION
[facility] mailed the [facility] CMS letter to the Pt's home address and SNF / [facility] on 2/12/20. DAVID GomesN, RN  Care Transition Coordinator  Contact Number:  [phone]

## 2020-02-19 ENCOUNTER — HOSPITAL ENCOUNTER (OUTPATIENT)
Dept: PHYSICAL THERAPY | Age: 80
Setting detail: THERAPIES SERIES
Discharge: HOME | End: 2020-02-19
Payer: MEDICARE

## 2020-02-19 ENCOUNTER — OFFICE VISIT (OUTPATIENT)
Dept: ORTHOPEDIC SURGERY | Age: 80
End: 2020-02-19

## 2020-02-19 ENCOUNTER — CARE COORDINATION (OUTPATIENT)
Dept: CASE MANAGEMENT | Age: 80
End: 2020-02-19

## 2020-02-19 VITALS
HEART RATE: 67 BPM | SYSTOLIC BLOOD PRESSURE: 120 MMHG | HEIGHT: 67 IN | DIASTOLIC BLOOD PRESSURE: 59 MMHG | WEIGHT: 177 LBS | BODY MASS INDEX: 27.78 KG/M2

## 2020-02-19 PROCEDURE — 97162 PT EVAL MOD COMPLEX 30 MIN: CPT

## 2020-02-19 PROCEDURE — 97112 NEUROMUSCULAR REEDUCATION: CPT

## 2020-02-19 PROCEDURE — 97110 THERAPEUTIC EXERCISES: CPT

## 2020-02-19 PROCEDURE — 99024 POSTOP FOLLOW-UP VISIT: CPT | Performed by: ORTHOPAEDIC SURGERY

## 2020-02-19 RX ORDER — ACETAMINOPHEN 500 MG
500 TABLET ORAL EVERY 6 HOURS PRN
COMMUNITY
End: 2020-03-23

## 2020-02-19 RX ORDER — OXYCODONE HYDROCHLORIDE AND ACETAMINOPHEN 5; 325 MG/1; MG/1
1 TABLET ORAL EVERY 4 HOURS PRN
COMMUNITY
End: 2020-03-23

## 2020-02-19 NOTE — PLAN OF CARE
[facility]                                                         Physical Therapy Certification    Dear Referring Practitioner: Jaqueline Moreau MD,    We had the pleasure of evaluating the following patient for physical therapy services at [address] [place] [address]. A summary of our findings can be found in the initial assessment below. This includes our plan of care. If you have any questions or concerns regarding these findings, please do not hesitate to contact me at the office phone number checked above. Thank you for the referral.       Physician Signature:_______________________________Date:__________________  By signing above (or electronic signature), therapists plan is approved by physician      Patient: Avelina Barrera   : 1940   MRN: [id number]  Referring Physician: Referring Practitioner: Jaqueline Moreau MD      Evaluation Date: 2020      Medical Diagnosis Information:  Diagnosis: S88.659 (ICD-10-CM) - Status post total hip replacement, left   Treatment Diagnosis: M25.552 Pain in Left Hip                                         Insurance information: PT Insurance Information: Medicare     Precautions/ Contra-indications: L ANGELES  Latex Allergy:  [x]NO      []YES  Preferred Language for Healthcare:   [x]English       []other:    SUBJECTIVE: Patient stated complaint: Patient is a 78year old female who presents to the clinic status-post L total hip replacement 2020. Patient states that she has been staying at a rehab facility since the surgery and is doing well. OPERATION PERFORMED:  Left total hip arthroplasty using Synergy stem REDAPT acetabular shell 0 degree 54 liner, 36 head/12 stem. There were four locking screws in the REDAPT cup 35 mm and 25 mm locking screws and a 20 mm fixation screw.     Relevant Medical History: DM II; HTN; Kidney Disease  Functional Disability Index: primary care physician regarding ROS issues if not already being addressed at this time. 2/19     Co-morbidities/Complexities (which will affect course of rehabilitation):   []None           Arthritic conditions   []Rheumatoid arthritis (M05.9)  [x]Osteoarthritis (M19.91)   Cardiovascular conditions   [x]Hypertension (I10)  []Hyperlipidemia (E78.5)  []Angina pectoris (I20)  []Atherosclerosis (I70)   Musculoskeletal conditions   []Disc pathology   []Congenital spine pathologies   []Prior surgical intervention  []Osteoporosis (M81.8)  []Osteopenia (M85.8)   Endocrine conditions   []Hypothyroid (E03.9)  []Hyperthyroid Gastrointestinal conditions   []Constipation (Y44.82)   Metabolic conditions   []Morbid obesity (E66.01)  [x]Diabetes type 1(E10.65) or 2 (E11.65)   []Neuropathy (G60.9)     Pulmonary conditions   []Asthma (J45)  []Coughing   []COPD (J44.9)   Psychological Disorders  [x]Anxiety (F41.9)  [x]Depression (F32.9)   []Other:   [x]Other: Kidney Disease          Barriers to/and or personal factors that will affect rehab potential:              [x]Age  [x]Sex              [x]Motivation/Lack of Motivation                        []Co-Morbidities              []Cognitive Function, education/learning barriers              []Environmental, home barriers              []profession/work barriers  [x]past PT/medical experience  []other:  Justification:     Falls Risk Assessment (30 days):   [x] Falls Risk assessed and no intervention required.   [] Falls Risk assessed and Patient requires intervention due to being higher risk   TUG score (>12s at risk):     [] Falls education provided, including       G-Codes:   WOMAC: 46%    ASSESSMENT:   Functional Impairments:     []Noted lumbar/proximal hip/LE hypomobility   [x]Decreased LE functional ROM   [x]Decreased core/proximal hip strength and neuromuscular control   [x]Decreased LE functional strength   [x]Reduced balance/proprioceptive control   []other:      Functional Activity Limitations (from functional questionnaire and intake)   [x]Reduced ability to tolerate prolonged functional positions   [x]Reduced ability or difficulty with changes of positions or transfers between positions   [x]Reduced ability to maintain good posture and demonstrate good body mechanics with sitting, bending, and lifting   [x]Reduced ability to sleep   [x] Reduced ability or tolerance with driving and/or computer work   [x]Reduced ability to perform lifting, carrying tasks   [x]Reduced ability to squat   [x]Reduced ability to forward bend   [x]Reduced ability to ambulate prolonged functional periods/distances/surfaces   [x]Reduced ability to ascend/descend stairs   [x]Reduced ability to run, hop or jump   []other:     Participation Restrictions   [x]Reduced participation in self care activities   [x]Reduced participation in home management activities   []Reduced participation in work activities   [x]Reduced participation in social activities. []Reduced participation in sport activities. Classification :    [x]Signs/symptoms consistent with post-surgical status including decreased ROM, strength and function.    []Signs/symptoms consistent with joint sprain/strain   []Signs/symptoms consistent with patella-femoral syndrome   []Signs/symptoms consistent with knee OA/hip OA   []Signs/symptoms consistent with internal derangement of knee/Hip   []Signs/symptoms consistent with functional hip weakness/NMR control      []Signs/symptoms consistent with tendinitis/tendinosis    []signs/symptoms consistent with pathology which may benefit from Dry needling      []other:      Prognosis/Rehab Potential:      []Excellent   [x]Good    []Fair   []Poor    Tolerance of evaluation/treatment:    []Excellent   [x]Good    []Fair   []Poor    Physical Therapy Evaluation Complexity Justification  [x] A history of present problem with:  [] no personal factors and/or comorbidities that impact the plan of care;  []1-2

## 2020-02-19 NOTE — PROGRESS NOTES
The patient returns today with her daughter and . She is 2 weeks out from a left total hip arthroplasty. She was in a extended care facility until just recently. She had about 6 days of urinary retention and had a catheter for that long now she is doing well. She is off pain medication. She is taking aspirin and Plavix. ROS: Pertinent items are noted in HPI. No notes on file    Past Medical History:  No date: Arthritis  No date: Blind left eye  No date: CAD (coronary artery disease)  No date: Chronic back pain  No date: Diabetes mellitus (HCC)  No date: Hearing loss      Comment:  both ears  No date: Hyperlipidemia  No date: Hypertension  No date: PONV (postoperative nausea and vomiting)     Past Surgical History:  No date: COLONOSCOPY  No date: CORONARY ANGIOPLASTY WITH STENT PLACEMENT      Comment:  2 stents   2/27/2019: EPIDURAL STEROID INJECTION; Bilateral      Comment:  BILATERAL L4 TRANSFORAMINAL EPIDURAL STEROID INJECTION                WITH FLUOROSCOPY performed by Jackie Mcghee MD at [address]  3/27/2019: EPIDURAL STEROID INJECTION; N/A      Comment:  L4/L5 INTERLAMINAR EPIDURAL STEROID INJECTION WITH                FLUOROSCOPY performed by [facility] Mcg[facility]  No date: EYE SURGERY; Right      Comment:  PROSTHETIC EYE   8/14/2019: LUMBAR NERVE BLOCK; N/A      Comment:  MIDLINE L4L5 INTERLAMINAR EPIDURAL STEROID INJECTION                WITH FLUOROSCOPY performed by Jackie Mcghee MD at [address]  2/4/2020: [address];  Left      Comment:  LEFT TOTAL HIP ARTHROPLASTY performed by Gennaro New MD at [facility] OR    Review of patient's family history indicates:  Problem: Diabetes      Relation: Sister          Age of Onset: (Not Specified)      Social History    Socioeconomic History      Marital status:       Spouse name: None      Number of children: None      Years of education: None      Highest education level: None    Occupational History      None    Social Needs      Financial resource strain: None      Food insecurity:        Worry: None        Inability: None      Transportation needs:        Medical: None        Non-medical: None    Tobacco Use      Smoking status: Current Every Day Smoker        Packs/day: 0.25        Types: E-Cigarettes      Smokeless tobacco: Never Used      Tobacco comment: vape not cigarettes    Substance and Sexual Activity      Alcohol use: No      Drug use: No      Sexual activity: None    Lifestyle      Physical activity:        Days per week: None        Minutes per session: None      Stress: None    Relationships      Social connections:        Talks on phone: None        Gets together: None        Attends Christianity service: None        Active member of club or organization: None        Attends meetings of clubs or organizations: None        Relationship status: None      Intimate partner violence:        Fear of current or ex partner: None        Emotionally abused: None        Physically abused: None        Forced sexual activity: None    Other Topics      Concerns:        None    Social History Narrative      None      Current Outpatient Medications:  acetaminophen (TYLENOL) 500 MG tablet, Take 500 mg by mouth every 6 hours as needed for Pain, Disp: , Rfl:   oxyCODONE-acetaminophen (PERCOCET) 5-325 MG per tablet, Take 1 tablet by mouth every 4 hours as needed for Pain., Disp: , Rfl:   polyethylene glycol (GLYCOLAX) packet, Take 17 g by mouth 2 times daily, Disp: 527 g, Rfl: 1  aspirin 81 MG EC tablet, Take 1 tablet by mouth 2 times daily for 28 days, Disp: 56 tablet, Rfl: 0  tamsulosin (FLOMAX) 0.4 MG capsule, Take 1 capsule by mouth daily, Disp: 10 capsule, Rfl: 0  nitrofurantoin, macrocrystal-monohydrate, (MACROBID) 100 MG capsule, Take 100 mg by mouth 2 times daily, Disp: , Rfl:   Cetirizine HCl (ZYRTEC ALLERGY PO), Take 1 tablet by mouth daily, Disp: , Rfl:   Olopatadine HCl (PAZEO) 0.7 % SOLN, Apply 1 drop to eye as needed, Disp: , Rfl:   senna-docusate (PERICOLACE) 8.6-50 MG per tablet, Take 2 tablets by mouth as needed for Constipation, Disp: , Rfl:   sodium chloride (OCEAN, BABY AYR) 0.65 % nasal spray, 2 sprays by Nasal route as needed for Congestion, Disp: , Rfl:   montelukast (SINGULAIR) 10 MG tablet, Take 10 mg by mouth nightly as needed , Disp: , Rfl:   Insulin Degludec (TRESIBA FLEXTOUCH SC), Inject 32 Units into the skin every morning, Disp: , Rfl:   Insulin Pen Needle 32G X 4 MM MISC, Use as directed for insulin administration, Disp: , Rfl:   ferrous sulfate (SLOW FE) 142 (45 Fe) MG extended release tablet, Take 1 tablet by mouth daily , Disp: , Rfl:   sodium bicarbonate 650 MG tablet, Take 1 tablet by mouth daily (Patient taking differently: Take 650 mg by mouth daily Two tablets daily), Disp: 90 tablet, Rfl: 1  atorvastatin (LIPITOR) 80 MG tablet, Take 80 mg by mouth nightly , Disp: , Rfl:   venlafaxine (EFFEXOR XR) 150 MG extended release capsule, Take 150 mg by mouth nightly , Disp: , Rfl:   nebivolol (BYSTOLIC) 5 MG tablet, Take 5 mg by mouth daily , Disp: , Rfl:   alprazolam (XANAX) 0.5 MG tablet, Take 0.5 mg by mouth 3 times daily as needed. , Disp: , Rfl:   clopidogrel (PLAVIX) 75 MG tablet, Take 75 mg by mouth daily. , Disp: , Rfl:      No current facility-administered medications for this visit.         -- Cephalexin -- Other (See Comments)    --  Other reaction(s): Skin Rash   -- Doxycycline -- Nausea And Vomiting and Rash    --  Nausea and vomiting   -- Levofloxacin -- Hives   -- Levofloxacin -- Other (See Comments)   -- Sulfa Antibiotics -- Other (See Comments) and Nausea                            Only    --  Bactrim   -- Ezetimibe -- Other (See Comments)   -- Metformin -- Other (See Comments)    --  Abdominal pain   -- Misoprostol -- Other (See Comments)    --  Abdominal pain             Abdominal pain   -- Naproxen -- Other (See Comments)    --  Abdominal pain Abdominal pain   -- Pioglitazone -- Other (See Comments)   -- Simvastatin -- Other (See Comments)   -- Trazodone -- Other (See Comments)   -- Trazodone Hcl -- Other (See Comments)   -- Actos [Pioglitazone Hydrochloride] -- Nausea Only   -- Bactrim -- Nausea Only   -- Cephalexin -- Itching   -- Ezetimibe-Simvastatin -- Nausea Only and Other (See                            Comments)   -- Lisinopril -- Nausea Only and Other (See                            Comments)   -- Trazodone And Nefazodone -- Nausea Only    VITAL SIGNS:  BP (!) 120/59   Pulse 67   Ht 5' 7\" (1.702 m)   Wt 177 lb (80.3 kg)   BMI 27.72 kg/m²   Examination today her incision is clean and dry and soft. She has good range of motion she flexes to at least 90 degrees abducts to about 40 degrees. Her leg lengths appear just about equal.  She has a soft calf and a negative Homans. Biotics for UTI while in the ECF she said this disturbed her appetite but she is getting this back as well. Impression doing well 2 weeks status post left total hip arthroplasty. Gail Yan: She may progress to full weightbearing in the next 2 weeks. We will see her back in a month.

## 2020-02-19 NOTE — FLOWSHEET NOTE
implemented, too soon to assess goals progression <30days   [] Goals require adjustment due to lack of progress  [] Patient is not progressing as expected and requires additional follow up with physician  [] Other    Prognosis for POC: [x] Good [] Fair  [] Poor      Patient requires continued skilled intervention: [x] Yes  [] No    Treatment/Activity Tolerance:  [x] Patient able to complete treatment  [] Patient limited by fatigue  [] Patient limited by pain     [] Patient limited by other medical complications  [] Other:         PLAN: See eval  [] Continue per plan of care [] Alter current plan (see comments above)  [x] Plan of care initiated [] Hold pending MD visit [] Discharge      Electronically signed by:  Ángel Donahue PT, DPT    Note: If patient does not return for scheduled/ recommended follow up visits, this note will serve as a discharge from care along with most recent update on progress.

## 2020-02-21 ENCOUNTER — HOSPITAL ENCOUNTER (OUTPATIENT)
Dept: PHYSICAL THERAPY | Age: 80
Setting detail: THERAPIES SERIES
Discharge: HOME | End: 2020-02-21
Payer: MEDICARE

## 2020-02-21 PROCEDURE — 97110 THERAPEUTIC EXERCISES: CPT

## 2020-02-21 PROCEDURE — 97530 THERAPEUTIC ACTIVITIES: CPT

## 2020-02-21 NOTE — FLOWSHEET NOTE
[facility] [address] [place] [address] [facility]    Physical Therapy Daily Treatment Note  Date:  2020    Patient Name:  Avelina Davies    :  1940  MRN: [id number]  Restrictions/Precautions:    Medical/Treatment Diagnosis Information:  Diagnosis: I35.060 (ICD-10-CM) - Status post total hip replacement, left  Treatment Diagnosis: M25.552 Pain in Left Hip  Insurance/Certification information:  PT Insurance Information: Medicare  Physician Information:  Referring Practitioner: Shanda Hilliard MD  Has the plan of care been signed (Y/N):        []  Yes  [x]  No     Date of Patient follow up with Physician: 3/16/2020    Is this a Progress Report:     []  Yes  [x]  No      If Yes:  Date Range for reporting period:  Beginnin2020  Ending:     Progress report will be due (10 Rx or 30 days whichever is less):      Recertification will be due (POC Duration  / 90 days whichever is less):  12 weeks    Visit # Insurance Allowable Auth Required   2 MEDICARE CAP []  Yes [x]  No        Functional Scale: WOMAC: 46%   Date assessed: 2020      Latex Allergy:  [x]NO      []YES  Preferred Language for Healthcare:   [x]English       []other:      Pain level:  5/10    SUBJECTIVE:  Pt reports she has not started trying to walk with a walker yet, she is scared of falling. Under impression she can only do 50% WBing.      OBJECTIVE:     RESTRICTIONS/PRECAUTIONS: L ANGELES  2020; no flexion > 90, no ER, no hip abd strengthening until > 4 weeks start with ISOs and gravity assisted; against gravity > 6 weeks    Exercises/Interventions:   Therapeutic Exercise (30414)  Resistance / level Sets/sec Reps Notes / Cues   Seated HS Stretch  30\" 3    Seated Calf Stretch  30\" 3    Ankle Pumps  1 30    Knee AROM Heel Slides  1 15    Supine Glute ISOs  10\" 10    Supine Quad ISOs  10\" 10    Hooklying Adduction ISOs  10\" 10    LAQ  2 10                                Therapeutic Activities (43768)       Standing weight shifts As tolerated on L LE 5'     Sit <> Stand  8  Cueing for L LE forward, accepting weight as tolerated    Gait Training w/ RW  6'  Cueing for walker, L LE, then R LE                  Neuromuscular Re-ed (44365)                            Manual Intervention (80300)       Hip PROM  3'  Gentle, slow movements not breaking precautions    Knee PROM  3'     Patella Mobs       Ankle mobs                         Therapeutic Exercise and NMR EXR  [x] (78059) Provided verbal/tactile cueing for activities related to strengthening, flexibility, endurance, ROM for improvements in LE, proximal hip, and core control with self care, mobility, lifting, ambulation. [x] (40990) Provided verbal/tactile cueing for activities related to improving balance, coordination, kinesthetic sense, posture, motor skill, proprioception  to assist with LE, proximal hip, and core control in self care, mobility, lifting, ambulation and eccentric single leg control.      NMR and Therapeutic Activities:    [x] (71910 or 02434) Provided verbal/tactile cueing for activities related to improving balance, coordination, kinesthetic sense, posture, motor skill, proprioception and motor activation to allow for proper function of core, proximal hip and LE with self care and ADLs  [] (59201) Gait Re-education- Provided training and instruction to the patient for proper LE, core and proximal hip recruitment and positioning and eccentric body weight control with ambulation re-education including up and down stairs     Home Exercise Program:    [x] (77874) Reviewed/Progressed HEP activities related to strengthening, flexibility, endurance, ROM of core, proximal hip and LE for functional self-care, mobility, lifting and ambulation/stair navigation   [] (46675)Reviewed/Progressed HEP activities related to improving balance, coordination, kinesthetic sense, posture, motor skill, proprioception of core, proximal hip and LE progress.

## 2020-02-25 ENCOUNTER — HOSPITAL ENCOUNTER (OUTPATIENT)
Age: 80
Discharge: HOME | End: 2020-02-25
Payer: MEDICARE

## 2020-02-25 ENCOUNTER — HOSPITAL ENCOUNTER (OUTPATIENT)
Dept: PHYSICAL THERAPY | Age: 80
Setting detail: THERAPIES SERIES
Discharge: HOME | End: 2020-02-25
Payer: MEDICARE

## 2020-02-25 LAB
ALBUMIN SERPL-MCNC: 3.8 G/DL (ref 3.4–5)
ANION GAP SERPL CALCULATED.3IONS-SCNC: 16 MMOL/L (ref 3–16)
BUN BLDV-MCNC: 30 MG/DL (ref 7–20)
CALCIUM SERPL-MCNC: 9.4 MG/DL (ref 8.3–10.6)
CHLORIDE BLD-SCNC: 99 MMOL/L (ref 99–110)
CO2: 22 MMOL/L (ref 21–32)
CREAT SERPL-MCNC: 1.7 MG/DL (ref 0.6–1.2)
GFR AFRICAN AMERICAN: 35
GFR NON-AFRICAN AMERICAN: 29
GLUCOSE BLD-MCNC: 151 MG/DL (ref 70–99)
PHOSPHORUS: 4.9 MG/DL (ref 2.5–4.9)
POTASSIUM SERPL-SCNC: 5.4 MMOL/L (ref 3.5–5.1)
SODIUM BLD-SCNC: 137 MMOL/L (ref 136–145)

## 2020-02-25 PROCEDURE — 80069 RENAL FUNCTION PANEL: CPT

## 2020-02-25 PROCEDURE — 36415 COLL VENOUS BLD VENIPUNCTURE: CPT

## 2020-02-25 PROCEDURE — 97110 THERAPEUTIC EXERCISES: CPT

## 2020-02-25 PROCEDURE — 97530 THERAPEUTIC ACTIVITIES: CPT

## 2020-02-25 NOTE — FLOWSHEET NOTE
[facility] [address] [place] [address] [facility]    Physical Therapy Daily Treatment Note  Date:  2020    Patient Name:  Arnold Rubinstein    :  1940  MRN: [id number]  Restrictions/Precautions:    Medical/Treatment Diagnosis Information:  Diagnosis: X70.041 (ICD-10-CM) - Status post total hip replacement, left  Treatment Diagnosis: M25.552 Pain in Left Hip  Insurance/Certification information:  PT Insurance Information: Medicare  Physician Information:  Referring Practitioner: Oracio Solis MD  Has the plan of care been signed (Y/N):        [x]  Yes  []  No     Date of Patient follow up with Physician: 3/16/2020    Is this a Progress Report:     []  Yes  [x]  No      If Yes:  Date Range for reporting period:  Beginnin2020  Ending:     Progress report will be due (10 Rx or 30 days whichever is less):  1548    Recertification will be due (POC Duration  / 90 days whichever is less):  12 weeks    Visit # Insurance Allowable Auth Required   3 MEDICARE CAP []  Yes [x]  No        Functional Scale: WOMAC: 46%   Date assessed: 2020      Latex Allergy:  [x]NO      []YES  Preferred Language for Healthcare:   [x]English       []other:      Pain level:  5/10    SUBJECTIVE:  Pt reports she is walking with her walker but her shoulders are starting to hurt due to the pressure put through them. She is walking with walker more at home. She has not attempted a step yet.      OBJECTIVE:     RESTRICTIONS/PRECAUTIONS: L ANGELES  2020; no flexion > 90, no ER, no hip abd strengthening until > 4 weeks start with ISOs and gravity assisted; against gravity > 6 weeks    Exercises/Interventions:   Therapeutic Exercise (16420)  Resistance / level Sets/sec Reps Notes / Cues   Seated HS Stretch  30\" 3 L    Seated Calf Stretch  30\" 3 L    Seated HR/TR  1 30    Knee AROM Heel Slides  1 15 L    Seated Glute ISOs  10\" 10    Supine Quad ISOs  10\" 10    Hooklying Adduction ISOs  10\" 10 LAQ 5# 3 10 L    SLR - Flexion  1 10 L Rocael through full ROM; soreness no pain   Standing HS Curl 5# 2 10 L                  Therapeutic Activities (72821)       Standing weight shifts As tolerated on L LE 5'     Sit <> Stand  4  Cueing for L LE forward, accepting weight as tolerated    Gait Training w/ RW  6'  Cueing for walker, L LE, then R LE; decrease pressure through B UEs                 Neuromuscular Re-ed (80122)                            Manual Intervention (28762)       Hip PROM  3'  Gentle, slow movements not breaking precautions    Knee PROM  3'     Patella Mobs       Ankle mobs                         Therapeutic Exercise and NMR EXR  [x] (04190) Provided verbal/tactile cueing for activities related to strengthening, flexibility, endurance, ROM for improvements in LE, proximal hip, and core control with self care, mobility, lifting, ambulation. [x] (77259) Provided verbal/tactile cueing for activities related to improving balance, coordination, kinesthetic sense, posture, motor skill, proprioception  to assist with LE, proximal hip, and core control in self care, mobility, lifting, ambulation and eccentric single leg control.      NMR and Therapeutic Activities:    [x] (55638 or 08234) Provided verbal/tactile cueing for activities related to improving balance, coordination, kinesthetic sense, posture, motor skill, proprioception and motor activation to allow for proper function of core, proximal hip and LE with self care and ADLs  [] (64608) Gait Re-education- Provided training and instruction to the patient for proper LE, core and proximal hip recruitment and positioning and eccentric body weight control with ambulation re-education including up and down stairs     Home Exercise Program:    [x] (31329) Reviewed/Progressed HEP activities related to strengthening, flexibility, endurance, ROM of core, proximal hip and LE for functional self-care, mobility, lifting and ambulation/stair navigation   [] AROM to WNL to allow for proper joint functioning as indicated by patients Functional Deficits. [] Progressing: [] Met: [] Not Met: [] Adjusted  3. Patient will demonstrate an increase in Strength to good proximal hip strength and control, within 5lb HHD in LE to allow for proper functional mobility as indicated by patients Functional Deficits. [] Progressing: [] Met: [] Not Met: [] Adjusted  4. Patient will return to Independent functional activities without increased symptoms or restriction. [] Progressing: [] Met: [] Not Met: [] Adjusted    Overall Progression Towards Functional goals/ Treatment Progress Update:  [] Patient is progressing as expected towards functional goals listed. [] Progression is slowed due to complexities/Impairments listed. [] Progression has been slowed due to co-morbidities. [x] Plan just implemented, too soon to assess goals progression <30days   [] Goals require adjustment due to lack of progress  [] Patient is not progressing as expected and requires additional follow up with physician  [] Other    Prognosis for POC: [x] Good [] Fair  [] Poor    Patient requires continued skilled intervention: [x] Yes  [] No    Assessment: Patient does well with new exercises, but is hesitant with initial weight shifting onto L LE and decreasing weight through UEs. Walker adjusted to appropriate height. Gait training completed for ambulating with walker, encouraged to increase WBing through L LE as able and progress to step through walking patterns. Pt cont to benefit from skilled PT.      Treatment/Activity Tolerance:  [x] Patient able to complete treatment  [] Patient limited by fatigue  [] Patient limited by pain     [] Patient limited by other medical complications  [] Other:       PLAN: See eval  [x] Continue per plan of care [] Alter current plan (see comments above)  [] Plan of care initiated [] Hold pending MD visit [] Discharge    Electronically signed by:  Madhu Mendoza, PT,

## 2020-02-26 ENCOUNTER — CARE COORDINATION (OUTPATIENT)
Dept: CASE MANAGEMENT | Age: 80
End: 2020-02-26

## 2020-02-26 PROCEDURE — 1111F DSCHRG MED/CURRENT MED MERGE: CPT

## 2020-02-27 ENCOUNTER — HOSPITAL ENCOUNTER (OUTPATIENT)
Dept: PHYSICAL THERAPY | Age: 80
Setting detail: THERAPIES SERIES
Discharge: HOME | End: 2020-02-27
Payer: MEDICARE

## 2020-02-27 PROCEDURE — 97110 THERAPEUTIC EXERCISES: CPT

## 2020-02-27 PROCEDURE — 97530 THERAPEUTIC ACTIVITIES: CPT

## 2020-02-27 NOTE — FLOWSHEET NOTE
The [address] [place] [address] [facility]    Physical Therapy Daily Treatment Note  Date:  2020    Patient Name:  Cezar Mclean    :  1940  MRN: [id number]  Restrictions/Precautions:    Medical/Treatment Diagnosis Information:  Diagnosis: Q16.110 (ICD-10-CM) - Status post total hip replacement, left  Treatment Diagnosis: M25.552 Pain in Left Hip  Insurance/Certification information:  PT Insurance Information: Medicare  Physician Information:  Referring Practitioner: Shamar Cardenas MD  Has the plan of care been signed (Y/N):        [x]  Yes  []  No     Date of Patient follow up with Physician: 3/16/2020    Is this a Progress Report:     []  Yes  [x]  No      If Yes:  Date Range for reporting period:  Beginnin2020  Ending:     Progress report will be due (10 Rx or 30 days whichever is less):      Recertification will be due (POC Duration  / 90 days whichever is less):  12 weeks    Visit # Insurance Allowable Auth Required   4 MEDICARE CAP []  Yes [x]  No        Functional Scale: WOMAC: 46%   Date assessed: 2020      Latex Allergy:  [x]NO      []YES  Preferred Language for Healthcare:   [x]English       []other:      Pain level:  3/10    SUBJECTIVE:  Pt reports she is doing fairly well today. Didn't sleep much due to a UTI and complications with that. Overall hip is feeling pretty good.      OBJECTIVE:     RESTRICTIONS/PRECAUTIONS: L ANGELES  2020; no flexion > 90, no ER, no hip abd strengthening until > 4 weeks start with ISOs and gravity assisted; against gravity > 6 weeks    Exercises/Interventions:   Therapeutic Exercise (65955)  Resistance / level Sets/sec Reps Notes / Cues   Seated HS Stretch  30\" 3 L    Seated Calf Stretch  30\" 3 L    Seated HR/TR  1 30    Knee AROM Heel Slides    HEP   Seated Glute ISOs  10\" 10    Supine Quad ISOs  10\" 10    Hooklying Adduction ISOs  10\" 10    SLR - Flexion  2 10 L Rocael through full ROM; soreness increased AROM to WNL to allow for proper joint functioning as indicated by patients Functional Deficits. [] Progressing: [] Met: [] Not Met: [] Adjusted  3. Patient will demonstrate an increase in Strength to good proximal hip strength and control, within 5lb HHD in LE to allow for proper functional mobility as indicated by patients Functional Deficits. [] Progressing: [] Met: [] Not Met: [] Adjusted  4. Patient will return to Independent functional activities without increased symptoms or restriction. [] Progressing: [] Met: [] Not Met: [] Adjusted    Overall Progression Towards Functional goals/ Treatment Progress Update:  [] Patient is progressing as expected towards functional goals listed. [] Progression is slowed due to complexities/Impairments listed. [] Progression has been slowed due to co-morbidities. [x] Plan just implemented, too soon to assess goals progression <30days   [] Goals require adjustment due to lack of progress  [] Patient is not progressing as expected and requires additional follow up with physician  [] Other    Prognosis for POC: [x] Good [] Fair  [] Poor    Patient requires continued skilled intervention: [x] Yes  [] No    Assessment: Patient does well with new exercises, but is hesitant with initial weight shifting onto L LE and decreasing weight through UEs. She has significant fatigue after Quantum strengthening, reporting \"it feels like I walked a mile\". Gait training completed for ambulating with walker, encouraged to increase WBing through L LE as able and progress to step through walking patterns this date. Pt cont to benefit from skilled PT.      Treatment/Activity Tolerance:  [x] Patient able to complete treatment   [] Patient limited by fatigue  [] Patient limited by pain     [] Patient limited by other medical complications  [] Other:       PLAN: See eval  [x] Continue per plan of care [] Alter current plan (see comments above)  [] Plan of care initiated [] Hold pending MD visit [] Discharge    Electronically signed by:  Irma Jj, PT, DPT    Note: If patient does not return for scheduled/ recommended follow up visits, this note will serve as a discharge from care along with most recent update on progress.

## 2020-03-03 ENCOUNTER — HOSPITAL ENCOUNTER (OUTPATIENT)
Dept: PHYSICAL THERAPY | Age: 80
Setting detail: THERAPIES SERIES
Discharge: HOME | End: 2020-03-03
Payer: MEDICARE

## 2020-03-03 PROCEDURE — 97530 THERAPEUTIC ACTIVITIES: CPT

## 2020-03-03 PROCEDURE — 97110 THERAPEUTIC EXERCISES: CPT

## 2020-03-03 NOTE — FLOWSHEET NOTE
The [address] [place] [address] [facility]    Physical Therapy Daily Treatment Note  Date:  3/3/2020    Patient Name:  Dereck Jones    :  1940  MRN: [id number]  Restrictions/Precautions:    Medical/Treatment Diagnosis Information:  Diagnosis: T59.162 (ICD-10-CM) - Status post total hip replacement, left  Treatment Diagnosis: M25.552 Pain in Left Hip  Insurance/Certification information:  PT Insurance Information: Medicare  Physician Information:  Referring Practitioner: Karen Nash MD  Has the plan of care been signed (Y/N):        [x]  Yes  []  No     Date of Patient follow up with Physician: 3/16/2020    Is this a Progress Report:     []  Yes  [x]  No      If Yes:  Date Range for reporting period:  Beginnin2020  Ending:     Progress report will be due (10 Rx or 30 days whichever is less):      Recertification will be due (POC Duration  / 90 days whichever is less):  12 weeks    Visit # Insurance Allowable Auth Required   5 MEDICARE CAP []  Yes [x]  No        Functional Scale: WOMAC: 46%   Date assessed: 2020      Latex Allergy:  [x]NO      []YES  Preferred Language for Healthcare:   [x]English       []other:      Pain level:  3/10    SUBJECTIVE:  Pt reports she is doing well today, walking is definitely getting better. She is noticing improvements in strength as she is able to get out of bed on her own now.       OBJECTIVE:     RESTRICTIONS/PRECAUTIONS: L ANGELES  2020; no flexion > 90, no ER, no hip abd strengthening until > 4 weeks start with ISOs and gravity assisted; against gravity > 6 weeks    Exercises/Interventions:   Therapeutic Exercise (70550)  Resistance / level Sets/sec Reps Notes / Cues   Seated HS Stretch  30\" 3 L Progress to standing npv   Seated Calf Stretch  30\" 3 L Progress to standing npv      HEP      HEP   Seated Glute ISOs  10\" 10        HEP   Hooklying Adduction ISOs  10\" 10    Hooklying Abduction ISOs  10\" 20 SLR - Flexion  3 10 L Able to complete independently 3/3 progressed sets   HR/TR  2 10 B    Quantum Knee Ext 20# 2 10 B    Quantum HS Curl 35# 2 10 B                  Therapeutic Activities (01533)       Biodex Balance: Weight Shifts  5'  For visual and tactile cueing of increased WBing on L LE   Sit <> Stand from chair with 1 AirEx on it and TRX  1 15 Cueing for equal WBing, push through glutes/quads, minimize UE support (faitgues out)   Gait Training w/ RW  6'  Cueing for walker, L LE, then R LE; decrease pressure through B UEs   Biodex Balance: Postural Stability Level 12 2' x2 Cueing for equal/symmetrical WBing for central dot          Neuromuscular Re-ed (90872)                            Manual Intervention (41275)       Hip PROM    Gentle, slow movements not breaking precautions    Knee PROM       Patella Mobs       Ankle mobs                       HEP  - 3/3: HEP add HR/TR in standing, hooklying hip abduction     Therapeutic Exercise and NMR EXR  [x] (39853) Provided verbal/tactile cueing for activities related to strengthening, flexibility, endurance, ROM for improvements in LE, proximal hip, and core control with self care, mobility, lifting, ambulation. [x] (79846) Provided verbal/tactile cueing for activities related to improving balance, coordination, kinesthetic sense, posture, motor skill, proprioception  to assist with LE, proximal hip, and core control in self care, mobility, lifting, ambulation and eccentric single leg control.      NMR and Therapeutic Activities:    [x] (65929 or 48475) Provided verbal/tactile cueing for activities related to improving balance, coordination, kinesthetic sense, posture, motor skill, proprioception and motor activation to allow for proper function of core, proximal hip and LE with self care and ADLs  [] (51566) Gait Re-education- Provided training and instruction to the patient for proper LE, core and proximal hip recruitment and positioning and eccentric body weight control with ambulation re-education including up and down stairs     Home Exercise Program:    [x] (57138) Reviewed/Progressed HEP activities related to strengthening, flexibility, endurance, ROM of core, proximal hip and LE for functional self-care, mobility, lifting and ambulation/stair navigation   [] (69196)Reviewed/Progressed HEP activities related to improving balance, coordination, kinesthetic sense, posture, motor skill, proprioception of core, proximal hip and LE for self care, mobility, lifting, and ambulation/stair navigation      Manual Treatments:  PROM / STM / Oscillations-Mobs:  G-I, II, III, IV (PA's, Inf., Post.)  [] (30788) Provided manual therapy to mobilize LE, proximal hip and/or LS spine soft tissue/joints for the purpose of modulating pain, promoting relaxation,  increasing ROM, reducing/eliminating soft tissue swelling/inflammation/restriction, improving soft tissue extensibility and allowing for proper ROM for normal function with self care, mobility, lifting and ambulation. Modalities:      Charges:  Timed Code Treatment Minutes: 40   Total Treatment Minutes: 50       [] EVAL (LOW) 36975 (typically 20 minutes face-to-face)  [] EVAL (MOD) 25856 (typically 30 minutes face-to-face)  [] EVAL (HIGH) 84406 (typically 45 minutes face-to-face)  [] RE-EVAL   [x] EN(25880) x  2    [] IONTO  [] NMR (36473) x  1    [] VASO  [] Manual (70668) x      [] Other:  [x] TA x 1      [] Mech Traction (71894)  [] ES(attended) (89030)      [] ES (un) (83779):     GOALS:   Patient stated goal: Return to normal activities   [] Progressing: [] Met: [] Not Met: [] Adjusted    Therapist goals for Patient:   Short Term Goals: To be achieved in: 2 weeks  1. Independent in HEP and progression per patient tolerance, in order to prevent re-injury. [] Progressing: [] Met: [] Not Met: [] Adjusted   2.  Patient will have a decrease in pain to facilitate improvement in movement, function, and ADLs as indicated by Functional Deficits. [] Progressing: [] Met: [] Not Met: [] Adjusted    Long Term Goals: To be achieved in: 12 weeks  1. Disability index score of 20% or less for the LEFS to assist with reaching prior level of function. [] Progressing: [] Met: [] Not Met: [] Adjusted  2. Patient will demonstrate increased AROM to WNL to allow for proper joint functioning as indicated by patients Functional Deficits. [] Progressing: [] Met: [] Not Met: [] Adjusted  3. Patient will demonstrate an increase in Strength to good proximal hip strength and control, within 5lb HHD in LE to allow for proper functional mobility as indicated by patients Functional Deficits. [] Progressing: [] Met: [] Not Met: [] Adjusted  4. Patient will return to Independent functional activities without increased symptoms or restriction. [] Progressing: [] Met: [] Not Met: [] Adjusted    Overall Progression Towards Functional goals/ Treatment Progress Update:  [] Patient is progressing as expected towards functional goals listed. [] Progression is slowed due to complexities/Impairments listed. [] Progression has been slowed due to co-morbidities. [x] Plan just implemented, too soon to assess goals progression <30days   [] Goals require adjustment due to lack of progress  [] Patient is not progressing as expected and requires additional follow up with physician  [] Other    Prognosis for POC: [x] Good [] Fair  [] Poor    Patient requires continued skilled intervention: [x] Yes  [] No    Assessment: Patient does well with new exercises, but is hesitant with initial weight shifting onto L LE and decreasing weight through UEs during Biodex Balance training. She has significant fatigue after Quantum strengthening and sit<>stand transitions repeated. Gait training completed for ambulating with walker, encouraged to increase WBing through L LE as able and progress to step through walking patterns this date.  Initiated hooklying hip abduction

## 2020-03-05 ENCOUNTER — HOSPITAL ENCOUNTER (OUTPATIENT)
Dept: PHYSICAL THERAPY | Age: 80
Setting detail: THERAPIES SERIES
Discharge: HOME | End: 2020-03-05
Payer: MEDICARE

## 2020-03-05 PROCEDURE — 97110 THERAPEUTIC EXERCISES: CPT

## 2020-03-05 PROCEDURE — 97140 MANUAL THERAPY 1/> REGIONS: CPT

## 2020-03-05 NOTE — FLOWSHEET NOTE
The [address] [place] [address] and Sports Rehabilitation, [facility]    Physical Therapy Daily Treatment Note  Date:  3/5/2020    Patient Name:  Jayson Erazo    :  1940  MRN: [id number]  Restrictions/Precautions:    Medical/Treatment Diagnosis Information:  Diagnosis: W17.342 (ICD-10-CM) - Status post total hip replacement, left  Treatment Diagnosis: M25.552 Pain in Left Hip  Insurance/Certification information:  PT Insurance Information: Medicare  Physician Information:  Referring Practitioner: Fernanda Malone MD  Has the plan of care been signed (Y/N):        [x]  Yes  []  No     Date of Patient follow up with Physician: 3/16/2020    Is this a Progress Report:     []  Yes  [x]  No      If Yes:  Date Range for reporting period:  Beginnin2020  Ending:     Progress report will be due (10 Rx or 30 days whichever is less):      Recertification will be due (POC Duration  / 90 days whichever is less):  12 weeks    Visit # Insurance Allowable Auth Required   6 MEDICARE CAP []  Yes [x]  No        Functional Scale: WOMAC: 46%   Date assessed: 2020      Latex Allergy:  [x]NO      []YES  Preferred Language for Healthcare:   [x]English       []other:      Pain level:  3/10    SUBJECTIVE:  Pt reports she is doing well today, walking is definitely getting better. She is noticing improvements in strength as she is able to get out of bed on her own now.  States her butt is starting to hurt more now that she is walking further distances       OBJECTIVE:     RESTRICTIONS/PRECAUTIONS: L ANGELES  2020; no flexion > 90, no ER, no hip abd strengthening until > 4 weeks start with ISOs and gravity assisted; against gravity > 6 weeks    Exercises/Interventions:   Therapeutic Exercise (54380)  Resistance / level Sets/sec Reps Notes / Cues   Standing Step HS Stretch  30\" 3 L    IB - Calf Stretch  30\" 3 L       HEP      HEP   Seated Glute ISOs  10\" 10        HEP   Hooklying Adduction ISOs daily - 7x weekly   Seated Long Arc Quad - 10 reps - 3 sets - 1x daily - 7x weekly   Standing Knee Flexion AROM with Chair Support - 10 reps - 3 sets - 1x daily - 7x weekly   Standing Heel Raise with Support - 10 reps - 3 sets - 1x daily - 7x weekly        Therapeutic Exercise and NMR EXR  [x] (51254) Provided verbal/tactile cueing for activities related to strengthening, flexibility, endurance, ROM for improvements in LE, proximal hip, and core control with self care, mobility, lifting, ambulation. [x] (58077) Provided verbal/tactile cueing for activities related to improving balance, coordination, kinesthetic sense, posture, motor skill, proprioception  to assist with LE, proximal hip, and core control in self care, mobility, lifting, ambulation and eccentric single leg control.      NMR and Therapeutic Activities:    [x] (83171 or 73697) Provided verbal/tactile cueing for activities related to improving balance, coordination, kinesthetic sense, posture, motor skill, proprioception and motor activation to allow for proper function of core, proximal hip and LE with self care and ADLs  [] (27459) Gait Re-education- Provided training and instruction to the patient for proper LE, core and proximal hip recruitment and positioning and eccentric body weight control with ambulation re-education including up and down stairs     Home Exercise Program:    [x] (39487) Reviewed/Progressed HEP activities related to strengthening, flexibility, endurance, ROM of core, proximal hip and LE for functional self-care, mobility, lifting and ambulation/stair navigation   [] (59143)Reviewed/Progressed HEP activities related to improving balance, coordination, kinesthetic sense, posture, motor skill, proprioception of core, proximal hip and LE for self care, mobility, lifting, and ambulation/stair navigation      Manual Treatments:  PROM / STM / Oscillations-Mobs:  G-I, II, III, IV (PA's, Inf., Post.)  [] (04801) Provided manual therapy to mobilize LE, proximal hip and/or LS spine soft tissue/joints for the purpose of modulating pain, promoting relaxation,  increasing ROM, reducing/eliminating soft tissue swelling/inflammation/restriction, improving soft tissue extensibility and allowing for proper ROM for normal function with self care, mobility, lifting and ambulation. Modalities:      Charges:  Timed Code Treatment Minutes: 40   Total Treatment Minutes: 58       [] EVAL (LOW) 87971 (typically 20 minutes face-to-face)  [] EVAL (MOD) 17080 (typically 30 minutes face-to-face)  [] EVAL (HIGH) 37817 (typically 45 minutes face-to-face)  [] RE-EVAL   [x] PP(25216) x  2    [] IONTO  [] NMR (45022) x  1    [] VASO  [x] Manual (81260) x 2      [] Other:  [] TA x 1      [] Mech Traction (78058)  [] ES(attended) (31157)      [] ES (un) (80478):     GOALS:   Patient stated goal: Return to normal activities   [] Progressing: [] Met: [] Not Met: [] Adjusted    Therapist goals for Patient:   Short Term Goals: To be achieved in: 2 weeks  1. Independent in HEP and progression per patient tolerance, in order to prevent re-injury. [] Progressing: [] Met: [] Not Met: [] Adjusted   2. Patient will have a decrease in pain to facilitate improvement in movement, function, and ADLs as indicated by Functional Deficits. [] Progressing: [] Met: [] Not Met: [] Adjusted    Long Term Goals: To be achieved in: 12 weeks  1. Disability index score of 20% or less for the LEFS to assist with reaching prior level of function. [] Progressing: [] Met: [] Not Met: [] Adjusted  2. Patient will demonstrate increased AROM to WNL to allow for proper joint functioning as indicated by patients Functional Deficits. [] Progressing: [] Met: [] Not Met: [] Adjusted  3. Patient will demonstrate an increase in Strength to good proximal hip strength and control, within 5lb HHD in LE to allow for proper functional mobility as indicated by patients Functional Deficits.    []

## 2020-03-05 NOTE — PROGRESS NOTES
OJ 4 oz to help absorption. 3. Recommended discussing Aranesp injections with Dr. Parag Lyn. 4. Eat low potassium foods  5. RTO 6 months    Thank you for allowing me to participate in the care [facility]. Scribe's attestation: This note was scribed in the presence of Dr. Wayne Chacon MD, by Angela Valdes RN. The scribe's documentation has been prepared under my direction and personally reviewed by me in its entirety. I confirm that the note above accurately reflects all work, treatment, procedures, and medical decision making performed by me.

## 2020-03-09 ENCOUNTER — HOSPITAL ENCOUNTER (OUTPATIENT)
Dept: PHYSICAL THERAPY | Age: 80
Setting detail: THERAPIES SERIES
Discharge: HOME | End: 2020-03-09
Payer: MEDICARE

## 2020-03-09 PROCEDURE — 97530 THERAPEUTIC ACTIVITIES: CPT

## 2020-03-09 PROCEDURE — 97110 THERAPEUTIC EXERCISES: CPT

## 2020-03-09 NOTE — FLOWSHEET NOTE
The [address] [place] [address] [facility]    Physical Therapy Daily Treatment Note  Date:  3/9/2020    Patient Name:  Lauro Jacobs    :  1940  MRN: [id number]  Restrictions/Precautions:    Medical/Treatment Diagnosis Information:  Diagnosis: A44.704 (ICD-10-CM) - Status post total hip replacement, left  Treatment Diagnosis: M25.552 Pain in Left Hip  Insurance/Certification information:  PT Insurance Information: Medicare  Physician Information:  Referring Practitioner: Robert Haskins MD  Has the plan of care been signed (Y/N):        [x]  Yes  []  No     Date of Patient follow up with Physician: 3/16/2020    Is this a Progress Report:     []  Yes  [x]  No      If Yes:  Date Range for reporting period:  Beginnin2020  Ending:     Progress report will be due (10 Rx or 30 days whichever is less):      Recertification will be due (POC Duration  / 90 days whichever is less):  12 weeks    Visit # Insurance Allowable Auth Required   7 MEDICARE CAP []  Yes [x]  No        Functional Scale: WOMAC: 46%   Date assessed: 2020      Latex Allergy:  [x]NO      []YES  Preferred Language for Healthcare:   [x]English       []other:      Pain level:  4/10 - across low back    SUBJECTIVE:  Pt reports she is doing well today, walking is definitely getting better. She walked into the clinic from the car. Reports back pain started yesterday and unsure of cause.        OBJECTIVE:     RESTRICTIONS/PRECAUTIONS: L ANGELES  2020; no flexion > 90, no ER, no hip abd strengthening until > 4 weeks start with ISOs and gravity assisted; against gravity > 6 weeks    Exercises/Interventions:   Therapeutic Exercise (37567)  Resistance / level Sets/sec Reps Notes / Cues   Standing Step HS Stretch  30\" 3 L    IB - Calf Stretch  30\" 3 L       HEP      HEP   Seated Glute ISOs  10\" 10        HEP   Hooklying Adduction ISOs  10\" 10    Hooklying Abduction ISOs     SLR - Flexion  3 10 L Able to complete independently 3/3 progressed sets   Single BKFO against TB lime 2 20 B 3/9 progressed resistance and reps   Bridge  2 10    HR/TR  2 10 B    Quantum Knee Ext 25# 3 10 B ^3/9 progressed weight   Quantum HS Curl 40# 3 10 B ^3/9 progressed weight                 Therapeutic Activities (53174)       Biodex Balance: Weight Shifts  For visual and tactile cueing of increased WBing on L LE   Sit <> Stand from chair with 1 AirEx on it and TRX  Cueing for equal WBing, push through glutes/quads, minimize UE support (faitgues out)   Gait Training w/ RW  Cueing for walker, L LE, then R LE; decrease pressure through B UEs   Biodex Balance: Postural Stability Level 12 2' x2 Cueing for equal/symmetrical WBing for central dot   Step Up  4\" 1 15 L                  Neuromuscular Re-ed (71676)                            Manual Intervention (70743)       Hip PROM   Gentle, slow movements not breaking precautions    Knee PROM      STM L Adductors      Ankle mobs                       HEP  - 3/3: HEP add HR/TR in standing, hooklying hip abduction  Access Code: [email or web address]   URL: [email or web address]   Date: 03/05/2020   Prepared by: Bob Carl     Exercises   Seated Gastroc Stretch with Strap - 10 reps - 3 sets - 1x daily - 7x weekly   Gastroc Stretch on Wall - 10 reps - 3 sets - 1x daily - 7x weekly   Seated Hamstring Stretch - 10 reps - 3 sets - 1x daily - 7x weekly   Standing Hamstring Stretch with Step - 10 reps - 3 sets - 1x daily - 7x weekly   Supine Active Straight Leg Raise - 10 reps - 3 sets - 1x daily - 7x weekly   Supine March - 10 reps - 3 sets - 1x daily - 7x weekly   Bent Knee Fallouts - 10 reps - 3 sets - 1x daily - 7x weekly   Hooklying Clamshell with Resistance - 10 reps - 3 sets - 1x daily - 7x weekly   Supine Gluteal Sets - 10 reps - 3 sets - 1x daily - 7x weekly   Supine Hip Adduction Isometric with Ball - 10 reps - 3 sets - 1x daily - 7x weekly   Supine Bridge - 10 reps - 3 sets - 1x daily - mobilize LE, proximal hip and/or LS spine soft tissue/joints for the purpose of modulating pain, promoting relaxation,  increasing ROM, reducing/eliminating soft tissue swelling/inflammation/restriction, improving soft tissue extensibility and allowing for proper ROM for normal function with self care, mobility, lifting and ambulation. Modalities:      Charges:  Timed Code Treatment Minutes: 40   Total Treatment Minutes: 55       [] EVAL (LOW) 03564 (typically 20 minutes face-to-face)  [] EVAL (MOD) 19784 (typically 30 minutes face-to-face)  [] EVAL (HIGH) 43442 (typically 45 minutes face-to-face)  [] RE-EVAL   [x] DT(45206) x  2    [] IONTO  [] NMR (51261) x  1    [] VASO  [] Manual (62418) x 2      [] Other:  [x] TA x 1      [] Mech Traction (70302)  [] ES(attended) (48888)      [] ES (un) (59010):     GOALS:   Patient stated goal: Return to normal activities   [] Progressing: [] Met: [] Not Met: [] Adjusted    Therapist goals for Patient:   Short Term Goals: To be achieved in: 2 weeks  1. Independent in HEP and progression per patient tolerance, in order to prevent re-injury. [] Progressing: [] Met: [] Not Met: [] Adjusted   2. Patient will have a decrease in pain to facilitate improvement in movement, function, and ADLs as indicated by Functional Deficits. [] Progressing: [] Met: [] Not Met: [] Adjusted    Long Term Goals: To be achieved in: 12 weeks  1. Disability index score of 20% or less for the LEFS to assist with reaching prior level of function. [] Progressing: [] Met: [] Not Met: [] Adjusted  2. Patient will demonstrate increased AROM to WNL to allow for proper joint functioning as indicated by patients Functional Deficits. [] Progressing: [] Met: [] Not Met: [] Adjusted  3. Patient will demonstrate an increase in Strength to good proximal hip strength and control, within 5lb HHD in LE to allow for proper functional mobility as indicated by patients Functional Deficits.    [] Progressing:

## 2020-03-12 ENCOUNTER — HOSPITAL ENCOUNTER (OUTPATIENT)
Dept: PHYSICAL THERAPY | Age: 80
Setting detail: THERAPIES SERIES
Discharge: HOME | End: 2020-03-12
Payer: MEDICARE

## 2020-03-12 PROCEDURE — 97110 THERAPEUTIC EXERCISES: CPT

## 2020-03-12 PROCEDURE — 97530 THERAPEUTIC ACTIVITIES: CPT

## 2020-03-12 NOTE — FLOWSHEET NOTE
Flexion  3 10 L Able to complete independently 3/3 progressed sets   Single BKFO against TB lime 2 20 B 3/9 progressed resistance and reps   Bridge lime 2 10    HR/TR  2 10 B    Quantum Knee Ext 25# 3 10 B ^3/9 progressed weight   Quantum HS Curl 40# 3 10 B ^3/9 progressed weight                 Therapeutic Activities (89674)       Biodex Balance: Weight Shifts  For visual and tactile cueing of increased WBing on L LE   Sit <> Stand from chair with 1 AirEx on it and TRX  1 15 Cueing for equal WBing, push through glutes/quads, minimize UE support (faitgues out)   Gait Training w/ RW  Cueing for walker, L LE, then R LE; decrease pressure through B UEs   Biodex Balance: Postural Stability Level 12 2' x2 Cueing for equal/symmetrical WBing for central dot   Step Up  4\" 2 10 L Progress to 6\" step npv                 Neuromuscular Re-ed (37795)                            Manual Intervention (05192)       Hip PROM   Gentle, slow movements not breaking precautions    Knee PROM      STM L Adductors      Ankle mobs                       HEP  - 3/3: HEP add HR/TR in standing, hooklying hip abduction  Access Code: [email or web address]   URL: [email or web address]   Date: 03/05/2020   Prepared by: Yue Santana     Exercises   Seated Gastroc Stretch with Strap - 10 reps - 3 sets - 1x daily - 7x weekly   Gastroc Stretch on Wall - 10 reps - 3 sets - 1x daily - 7x weekly   Seated Hamstring Stretch - 10 reps - 3 sets - 1x daily - 7x weekly   Standing Hamstring Stretch with Step - 10 reps - 3 sets - 1x daily - 7x weekly   Supine Active Straight Leg Raise - 10 reps - 3 sets - 1x daily - 7x weekly   Supine March - 10 reps - 3 sets - 1x daily - 7x weekly   Bent Knee Fallouts - 10 reps - 3 sets - 1x daily - 7x weekly   Hooklying Clamshell with Resistance - 10 reps - 3 sets - 1x daily - 7x weekly   Supine Gluteal Sets - 10 reps - 3 sets - 1x daily - 7x weekly   Supine Hip Adduction Isometric with Ball - 10 reps - 3 sets - 1x daily - 7x weekly Supine Bridge - 10 reps - 3 sets - 1x daily - 7x weekly   Seated Long Arc Quad - 10 reps - 3 sets - 1x daily - 7x weekly   Standing Knee Flexion AROM with Chair Support - 10 reps - 3 sets - 1x daily - 7x weekly   Standing Heel Raise with Support - 10 reps - 3 sets - 1x daily - 7x weekly        Therapeutic Exercise and NMR EXR  [x] (18794) Provided verbal/tactile cueing for activities related to strengthening, flexibility, endurance, ROM for improvements in LE, proximal hip, and core control with self care, mobility, lifting, ambulation. [x] (96598) Provided verbal/tactile cueing for activities related to improving balance, coordination, kinesthetic sense, posture, motor skill, proprioception  to assist with LE, proximal hip, and core control in self care, mobility, lifting, ambulation and eccentric single leg control.      NMR and Therapeutic Activities:    [x] (88159 or 70208) Provided verbal/tactile cueing for activities related to improving balance, coordination, kinesthetic sense, posture, motor skill, proprioception and motor activation to allow for proper function of core, proximal hip and LE with self care and ADLs  [] (24395) Gait Re-education- Provided training and instruction to the patient for proper LE, core and proximal hip recruitment and positioning and eccentric body weight control with ambulation re-education including up and down stairs     Home Exercise Program:    [x] (12769) Reviewed/Progressed HEP activities related to strengthening, flexibility, endurance, ROM of core, proximal hip and LE for functional self-care, mobility, lifting and ambulation/stair navigation   [] (41642)Reviewed/Progressed HEP activities related to improving balance, coordination, kinesthetic sense, posture, motor skill, proprioception of core, proximal hip and LE for self care, mobility, lifting, and ambulation/stair navigation      Manual Treatments:  PROM / STM / Oscillations-Mobs:  G-I, II, III, IV (PA's, Inf., patients Functional Deficits. [] Progressing: [] Met: [] Not Met: [] Adjusted  4. Patient will return to Independent functional activities without increased symptoms or restriction. [] Progressing: [] Met: [] Not Met: [] Adjusted    Overall Progression Towards Functional goals/ Treatment Progress Update:  [] Patient is progressing as expected towards functional goals listed. [] Progression is slowed due to complexities/Impairments listed. [] Progression has been slowed due to co-morbidities. [x] Plan just implemented, too soon to assess goals progression <30days   [] Goals require adjustment due to lack of progress  [] Patient is not progressing as expected and requires additional follow up with physician  [] Other    Prognosis for POC: [x] Good [] Fair  [] Poor    Patient requires continued skilled intervention: [x] Yes  [] No    Assessment: Patient does well with progression of exercises, but is unable to initiate supine hip abduction due to pain at surgical site. She has significant fatigue after Quantum strengthening and sit<>stand transitions. Pt cont to benefit from skilled PT. Treatment/Activity Tolerance:  [x] Patient able to complete treatment   [] Patient limited by fatigue  [] Patient limited by pain     [] Patient limited by other medical complications  [] Other:       PLAN: See eval  [x] Continue per plan of care [] Alter current plan (see comments above)  [] Plan of care initiated [] Hold pending MD visit [] Discharge    Electronically signed by:  Mandi Rubio, PT, DPT    Note: If patient does not return for scheduled/ recommended follow up visits, this note will serve as a discharge from care along with most recent update on progress.

## 2020-03-16 ENCOUNTER — HOSPITAL ENCOUNTER (OUTPATIENT)
Dept: PHYSICAL THERAPY | Age: 80
Setting detail: THERAPIES SERIES
Discharge: HOME | End: 2020-03-16
Payer: MEDICARE

## 2020-03-16 ENCOUNTER — HOSPITAL ENCOUNTER (OUTPATIENT)
Age: 80
Discharge: HOME | End: 2020-03-16
Payer: MEDICARE

## 2020-03-16 ENCOUNTER — OFFICE VISIT (OUTPATIENT)
Dept: ORTHOPEDIC SURGERY | Age: 80
End: 2020-03-16

## 2020-03-16 VITALS
SYSTOLIC BLOOD PRESSURE: 101 MMHG | BODY MASS INDEX: 27.78 KG/M2 | WEIGHT: 177 LBS | HEIGHT: 67 IN | HEART RATE: 72 BPM | DIASTOLIC BLOOD PRESSURE: 53 MMHG

## 2020-03-16 LAB
ALBUMIN SERPL-MCNC: 4 G/DL (ref 3.4–5)
ANION GAP SERPL CALCULATED.3IONS-SCNC: 15 MMOL/L (ref 3–16)
BUN BLDV-MCNC: 32 MG/DL (ref 7–20)
CALCIUM SERPL-MCNC: 9.4 MG/DL (ref 8.3–10.6)
CHLORIDE BLD-SCNC: 102 MMOL/L (ref 99–110)
CO2: 22 MMOL/L (ref 21–32)
CREAT SERPL-MCNC: 1.7 MG/DL (ref 0.6–1.2)
GFR AFRICAN AMERICAN: 35
GFR NON-AFRICAN AMERICAN: 29
GLUCOSE BLD-MCNC: 71 MG/DL (ref 70–99)
PHOSPHORUS: 4.1 MG/DL (ref 2.5–4.9)
POTASSIUM SERPL-SCNC: 5.5 MMOL/L (ref 3.5–5.1)
SODIUM BLD-SCNC: 139 MMOL/L (ref 136–145)

## 2020-03-16 PROCEDURE — 99024 POSTOP FOLLOW-UP VISIT: CPT | Performed by: ORTHOPAEDIC SURGERY

## 2020-03-16 PROCEDURE — 97530 THERAPEUTIC ACTIVITIES: CPT

## 2020-03-16 PROCEDURE — 80069 RENAL FUNCTION PANEL: CPT

## 2020-03-16 PROCEDURE — 36415 COLL VENOUS BLD VENIPUNCTURE: CPT

## 2020-03-16 PROCEDURE — 97110 THERAPEUTIC EXERCISES: CPT

## 2020-03-16 NOTE — FLOWSHEET NOTE
Single BKFO against TB blue 2 20 B 3/9 progressed resistance and reps   Bridge blue 3 10    HR/TR  2 10 B    Quantum Knee Ext 25# 3 10 B ^3/9 progressed weight   Quantum HS Curl 40# 3 10 B ^3/9 progressed weight                 Therapeutic Activities (05567)       Biodex Balance: Weight Shifts  For visual and tactile cueing of increased WBing on L LE   Sit <> Stand from chair with 1 AirEx on it and TRX  1 15 Cueing for equal WBing, push through glutes/quads, minimize UE support (faitgues out)   Gait Training w/ RW  Cueing for walker, L LE, then R LE; decrease pressure through B UEs          Step Up  6\" 2 10 L Progress to 6\" step npv                 Neuromuscular Re-ed (77273)       Biodex Balance: Postural Stability Cueing for equal/symmetrical WBing for central dot   Staggered Stance on AirEx  30\" x2 R/L                  Manual Intervention (79422)       Hip PROM   Gentle, slow movements not breaking precautions    Knee PROM      STM L Adductors      Ankle mobs                       HEP  - 3/3: HEP add HR/TR in standing, hooklying hip abduction  - 3/5 [email or web address]:  Seated Gastroc Stretch with Strap, Gastroc Stretch on Wall, Seated Hamstring Stretch, Standing Hamstring Stretch with Step, Supine Active Straight Leg Raise, Supine March, Bent Knee Fallouts, Hooklying Clamshell with Resistance, Supine Gluteal Set, Supine Hip Adduction Isometric with Ball, Supine Bridge, Seated [place], Standing Knee Flexion AROM with Chair Support, Standing Heel Raise with Support      Therapeutic Exercise and NMR EXR  [x] (64177) Provided verbal/tactile cueing for activities related to strengthening, flexibility, endurance, ROM for improvements in LE, proximal hip, and core control with self care, mobility, lifting, ambulation.   [x] (47604) Provided verbal/tactile cueing for activities related to improving balance, coordination, kinesthetic sense, posture, motor skill, proprioception  to assist with LE, proximal hip, and core x 2      [] Other:  [x] TA x 1      [] Mech Traction (06103)  [] ES(attended) (89020)      [] ES (un) (35655):     GOALS:   Patient stated goal: Return to normal activities   [] Progressing: [] Met: [] Not Met: [] Adjusted    Therapist goals for Patient:   Short Term Goals: To be achieved in: 2 weeks  1. Independent in HEP and progression per patient tolerance, in order to prevent re-injury. [] Progressing: [] Met: [] Not Met: [] Adjusted   2. Patient will have a decrease in pain to facilitate improvement in movement, function, and ADLs as indicated by Functional Deficits. [] Progressing: [] Met: [] Not Met: [] Adjusted    Long Term Goals: To be achieved in: 12 weeks  1. Disability index score of 20% or less for the LEFS to assist with reaching prior level of function. [] Progressing: [] Met: [] Not Met: [] Adjusted  2. Patient will demonstrate increased AROM to WNL to allow for proper joint functioning as indicated by patients Functional Deficits. [] Progressing: [] Met: [] Not Met: [] Adjusted  3. Patient will demonstrate an increase in Strength to good proximal hip strength and control, within 5lb HHD in LE to allow for proper functional mobility as indicated by patients Functional Deficits. [] Progressing: [] Met: [] Not Met: [] Adjusted  4. Patient will return to Independent functional activities without increased symptoms or restriction. [] Progressing: [] Met: [] Not Met: [] Adjusted    Overall Progression Towards Functional goals/ Treatment Progress Update:  [] Patient is progressing as expected towards functional goals listed. [] Progression is slowed due to complexities/Impairments listed. [] Progression has been slowed due to co-morbidities.   [x] Plan just implemented, too soon to assess goals progression <30days   [] Goals require adjustment due to lack of progress  [] Patient is not progressing as expected and requires additional follow up with physician  [] Other    Prognosis for

## 2020-03-19 ENCOUNTER — HOSPITAL ENCOUNTER (OUTPATIENT)
Dept: PHYSICAL THERAPY | Age: 80
Setting detail: THERAPIES SERIES
Discharge: HOME | End: 2020-03-19
Payer: MEDICARE

## 2020-03-19 PROCEDURE — 97530 THERAPEUTIC ACTIVITIES: CPT

## 2020-03-19 PROCEDURE — 97110 THERAPEUTIC EXERCISES: CPT

## 2020-03-19 NOTE — FLOWSHEET NOTE
The [address] [place] [address] and Sports Rehabilitation, [facility]    Physical Therapy Daily Treatment Note  Date:  3/19/2020    Patient Name:  Ramirez Chandler    :  1940  MRN: [id number]  Restrictions/Precautions:    Medical/Treatment Diagnosis Information:  Diagnosis: B58.417 (ICD-10-CM) - Status post total hip replacement, left  Treatment Diagnosis: M25.552 Pain in Left Hip  Insurance/Certification information:  PT Insurance Information: Medicare  Physician Information:  Referring Practitioner: Hedy Keita MD  Has the plan of care been signed (Y/N):        [x]  Yes  []  No     Date of Patient follow up with Physician: 3/16/2020    Is this a Progress Report:     [x]  Yes  []  No      If Yes:  Date Range for reporting period:  Beginnin2020  Ending:     Progress report will be due (10 Rx or 30 days whichever is less):      Recertification will be due (POC Duration  / 90 days whichever is less):  12 weeks    Visit # Insurance Allowable Auth Required   10 MEDICARE CAP []  Yes [x]  No        Functional Scale: WOMAC: 46%   Date assessed: 2020      Latex Allergy:  [x]NO      []YES  Preferred Language for Healthcare:   [x]English       []other:      Pain level:  2/10 - across low back when walking    SUBJECTIVE:  Pt reports she saw her MD. Everything is going fine. She is allowed to start exercises taking her leg out to the side now. She saw her PCP as well and has some low levels. She is anemic.      OBJECTIVE:   3/19:   Gait: ambulation with RW; decreased L stance time, decreased gait speed  MMT L LE: hip flex: 4/5, hip abd: 3-/5  SOB with quantum machine exercises and sit to stand    RESTRICTIONS/PRECAUTIONS: L ANGELES  2020; no flexion > 90, no ER, no hip abd strengthening until > 4 weeks start with ISOs and gravity assisted; against gravity > 6 weeks    Exercises/Interventions:   Therapeutic Exercise (22376)  Resistance / level Sets/sec Reps Notes / Cues Standing Step HS Stretch  30\" 3 L    IB - Calf Stretch  30\" 3 L       HEP      HEP   Seated Glute ISOs  10\" 10        HEP   Hooklying Adduction ISOs  10\" 10    Hooklying Abduction ISOs     SLR - Flexion  3 10 L Able to complete independently 3/3 progressed sets, 3/19: frequent VC to decrease hip ER   Single BKFO against TB blue 2 20 B 3/9 progressed resistance and reps   Bridge blue 3 10 3/19: VC to maintain neutral hip position   Supine hip abd  2 10 Added 3/19   HR/TR  2 10 B    Quantum Knee Ext 30# 3 10 B ^3/9 progressed weight   Quantum HS Curl 40# 3 10 B ^3/9 progressed weight                 Therapeutic Activities (81541)       Biodex Balance: Weight Shifts  For visual and tactile cueing of increased WBing on L LE   Sit <> Stand from chair with 1 AirEx on it and TRX  2 10 Cueing for equal WBing, push through glutes/quads, minimize UE support (faitgues out)   Gait Training w/ RW  Cueing for walker, L LE, then R LE; decrease pressure through B UEs          Step Up  6\" 2 10 L Progress to 6\" step npv                 Neuromuscular Re-ed (03110)       Biodex Balance: Postural Stability Cueing for equal/symmetrical WBing for central dot   Staggered Stance on AirEx  30\" x2 R/L                  Manual Intervention (23623)       Hip PROM   Gentle, slow movements not breaking precautions    Knee PROM      STM L Adductors      Ankle mobs                       HEP  - 3/3: HEP add HR/TR in standing, hooklying hip abduction  - 3/5 [id number]:  Seated Gastroc Stretch with Strap, Gastroc Stretch on Wall, Seated Hamstring Stretch, Standing Hamstring Stretch with Step, Supine Active Straight Leg Raise, Supine March, Bent Knee Fallouts, Hooklying Clamshell with Resistance, Supine Gluteal Set, Supine Hip Adduction Isometric with Ball, Supine Bridge, Seated [place], Standing Knee Flexion AROM with Chair Support, Standing Heel Raise with Support      Therapeutic Exercise and NMR EXR  [x] (65918) Provided verbal/tactile cueing

## 2020-03-20 ENCOUNTER — CARE COORDINATION (OUTPATIENT)
Dept: CASE MANAGEMENT | Age: 80
End: 2020-03-20

## 2020-03-23 ENCOUNTER — OFFICE VISIT (OUTPATIENT)
Dept: CARDIOLOGY CLINIC | Age: 80
End: 2020-03-23
Payer: MEDICARE

## 2020-03-23 ENCOUNTER — HOSPITAL ENCOUNTER (OUTPATIENT)
Dept: PHYSICAL THERAPY | Age: 80
Setting detail: THERAPIES SERIES
Discharge: HOME | End: 2020-03-23
Payer: MEDICARE

## 2020-03-23 VITALS
WEIGHT: 168 LBS | HEIGHT: 67 IN | SYSTOLIC BLOOD PRESSURE: 106 MMHG | OXYGEN SATURATION: 99 % | BODY MASS INDEX: 26.37 KG/M2 | DIASTOLIC BLOOD PRESSURE: 54 MMHG | HEART RATE: 81 BPM

## 2020-03-23 PROCEDURE — 4004F PT TOBACCO SCREEN RCVD TLK: CPT | Performed by: INTERNAL MEDICINE

## 2020-03-23 PROCEDURE — 4040F PNEUMOC VAC/ADMIN/RCVD: CPT | Performed by: INTERNAL MEDICINE

## 2020-03-23 PROCEDURE — G8417 CALC BMI ABV UP PARAM F/U: HCPCS | Performed by: INTERNAL MEDICINE

## 2020-03-23 PROCEDURE — 1090F PRES/ABSN URINE INCON ASSESS: CPT | Performed by: INTERNAL MEDICINE

## 2020-03-23 PROCEDURE — 1123F ACP DISCUSS/DSCN MKR DOCD: CPT | Performed by: INTERNAL MEDICINE

## 2020-03-23 PROCEDURE — G8427 DOCREV CUR MEDS BY ELIG CLIN: HCPCS | Performed by: INTERNAL MEDICINE

## 2020-03-23 PROCEDURE — 97110 THERAPEUTIC EXERCISES: CPT

## 2020-03-23 PROCEDURE — 99214 OFFICE O/P EST MOD 30 MIN: CPT | Performed by: INTERNAL MEDICINE

## 2020-03-23 PROCEDURE — 97530 THERAPEUTIC ACTIVITIES: CPT

## 2020-03-23 PROCEDURE — G8400 PT W/DXA NO RESULTS DOC: HCPCS | Performed by: INTERNAL MEDICINE

## 2020-03-23 PROCEDURE — G8484 FLU IMMUNIZE NO ADMIN: HCPCS | Performed by: INTERNAL MEDICINE

## 2020-03-23 NOTE — FLOWSHEET NOTE
[facility] [address] [place] [address] [facility]    [facility] Daily Treatment Note  Date:  3/23/2020    Patient Name:  Kal Leonardo    :  1940  MRN: [id number]  Restrictions/Precautions:    Medical/Treatment Diagnosis Information:  Diagnosis: E77.920 (ICD-10-CM) - Status post total hip replacement, left  Treatment Diagnosis: M25.552 Pain in Left Hip  Insurance/Certification information:  PT Insurance Information: Medicare  Physician Information:  Referring Practitioner: Renetta Hong MD  Has the plan of care been signed (Y/N):        [x]  Yes  []  No     Date of Patient follow up with Physician: 2020    Is this a Progress Report:     []  Yes  [x]  No      If Yes:  Date Range for reporting period:  Beginnin2020  Ending: 3/19/2020    Progress report will be due (10 Rx or 30 days whichever is less):  3/06/4612    Recertification will be due (POC Duration  / 90 days whichever is less):  12 weeks    Visit # Insurance Allowable Auth Required   11 MEDICARE CAP []  Yes [x]  No        Functional Scale: WOMAC: 46%   Date assessed: 2020      Latex Allergy:  [x]NO      []YES  Preferred Language for Healthcare:   [x]English       []other:      Pain level:  2/10 - across low back when walking    SUBJECTIVE:  Pt reports she is doing well today. No pain in L hip. Reports compliance with HEP.      OBJECTIVE:   3/19:   Gait: ambulation with RW; decreased L stance time, decreased gait speed  MMT L LE: hip flex: 4/5, hip abd: 3-/5  SOB with quantum machine exercises and sit to stand    RESTRICTIONS/PRECAUTIONS: L ANGELES  2020; no flexion > 90, no ER, no hip abd strengthening until > 4 weeks start with ISOs and gravity assisted; against gravity > 6 weeks    Exercises/Interventions:   Therapeutic Exercise (33294)  Resistance / level Sets/sec Reps Notes / Cues   Standing Step HS Stretch  30\" 3 L    IB - Calf Stretch  30\" 3 L       HEP      HEP   Seated Glute ISOs 10\" 10        HEP   Hooklying Adduction ISOs  10\" 10    Hooklying Abduction ISOs     SLR - Flexion  Able to complete independently 3/3 progressed sets, 3/19: frequent VC to decrease hip ER   Single BKFO against TB blue 2 20 B 3/9 progressed resistance and reps   Bridge 3/19: VC to maintain neutral hip position   Supine hip abd  2 10 Added 3/19   Clamshells  2 20    HR/TR  2 10 B    Quantum Knee Ext 35# 3 10 B ^3/23 progressed weight   Quantum HS Curl 40# 3 10 B ^3/9 progressed weight          Standing Hip AB  2 10 L only Unable to perform with R LE due to L LE strength   Standing Hip Ext  2 10 L only Unable to perform with R LE due to L LE strength                 Therapeutic Activities (36224)       Biodex Balance: Weight Shifts  For visual and tactile cueing of increased WBing on L LE   Sit <> Stand from chair with 1 AirEx on it and TRX  2 10 Cueing for equal WBing, push through glutes/quads, minimize UE support (faitgues out)   Gait Training w/ RW  Cueing for walker, L LE, then R LE; decrease pressure through B UEs          Step Up - Fwd 6\" 2 10 L    Step Up - Lateral 4\" 2 10 L           Neuromuscular Re-ed (78292)       Biodex Balance: Postural Stability Cueing for equal/symmetrical WBing for central dot   Staggered Stance on AirEx                   Manual Intervention (15961)       Hip PROM  5' Gentle, slow movements not breaking precautions    Knee PROM      STM L Adductors      Ankle mobs                       HEP  - 3/3: HEP add HR/TR in standing, hooklying hip abduction  - 3/5 [id number]:  Seated Gastroc Stretch with Strap, Gastroc Stretch on Wall, Seated Hamstring Stretch, Standing Hamstring Stretch with Step, Supine Active Straight Leg Raise, Supine March, Bent Knee Fallouts, Hooklying Clamshell with Resistance, Supine Gluteal Set, Supine Hip Adduction Isometric with Ball, Supine Bridge, Seated [place], Standing Knee Flexion AROM with Chair Support, Standing Heel Raise with Support    Access Code: [email or web address]   URL: [email or web address]   Date: 03/23/2020   Prepared by: Retha Bernheim     Exercises   Clamshell - 10 reps - 3 sets - 1x daily - 7x weekly   Supine Hip Abduction - 10 reps - 3 sets - 1x daily - 7x weekly   Standing Hip Abduction with Counter Support - 10 reps - 3 sets - 1x daily - 7x weekly   Standing Hip Extension with Counter Support - 10 reps - 3 sets - 1x daily - 7x weekly   Standing March with Counter Support - 10 reps - 3 sets - 1x daily - 7x weekly   Sidelying Hip Abduction - 10 reps - 3 sets - 1x daily - 7x weekly     Therapeutic Exercise and NMR EXR  [x] (94687) Provided verbal/tactile cueing for activities related to strengthening, flexibility, endurance, ROM for improvements in LE, proximal hip, and core control with self care, mobility, lifting, ambulation. [x] (93459) Provided verbal/tactile cueing for activities related to improving balance, coordination, kinesthetic sense, posture, motor skill, proprioception  to assist with LE, proximal hip, and core control in self care, mobility, lifting, ambulation and eccentric single leg control.      NMR and Therapeutic Activities:    [x] (34508 or 41088) Provided verbal/tactile cueing for activities related to improving balance, coordination, kinesthetic sense, posture, motor skill, proprioception and motor activation to allow for proper function of core, proximal hip and LE with self care and ADLs  [] (39150) Gait Re-education- Provided training and instruction to the patient for proper LE, core and proximal hip recruitment and positioning and eccentric body weight control with ambulation re-education including up and down stairs     Home Exercise Program:    [x] (00324) Reviewed/Progressed HEP activities related to strengthening, flexibility, endurance, ROM of core, proximal hip and LE for functional self-care, mobility, lifting and ambulation/stair navigation   [] (33667)Reviewed/Progressed HEP activities related to improving balance, coordination, kinesthetic sense, posture, motor skill, proprioception of core, proximal hip and LE for self care, mobility, lifting, and ambulation/stair navigation      Manual Treatments:  PROM / STM / Oscillations-Mobs:  G-I, II, III, IV (PA's, Inf., Post.)  [] (31018) Provided manual therapy to mobilize LE, proximal hip and/or LS spine soft tissue/joints for the purpose of modulating pain, promoting relaxation,  increasing ROM, reducing/eliminating soft tissue swelling/inflammation/restriction, improving soft tissue extensibility and allowing for proper ROM for normal function with self care, mobility, lifting and ambulation. Modalities:      Charges:  Timed Code Treatment Minutes: 55   Total Treatment Minutes: 55       [] EVAL (LOW) 81933 (typically 20 minutes face-to-face)  [] EVAL (MOD) 55787 (typically 30 minutes face-to-face)  [] EVAL (HIGH) 48985 (typically 45 minutes face-to-face)  [] RE-EVAL   [x] FF(20458) x  3    [] IONTO  [] NMR (92438) x  1    [] VASO  [] Manual (70332) x 2      [] Other:  [x] TA x 1      [] Mech Traction (98686)  [] ES(attended) (68771)      [] ES (un) (37852):     GOALS:   Patient stated goal: Return to normal activities   [] Progressing: [] Met: [] Not Met: [] Adjusted    Therapist goals for Patient:   Short Term Goals: To be achieved in: 2 weeks  1. Independent in HEP and progression per patient tolerance, in order to prevent re-injury. GOAL PROGRESSING  [] Progressing: [] Met: [] Not Met: [] Adjusted   2. Patient will have a decrease in pain to facilitate improvement in movement, function, and ADLs as indicated by Functional Deficits. GOAL PROGRESSING  [] Progressing: [] Met: [] Not Met: [] Adjusted    Long Term Goals: To be achieved in: 12 weeks  1. Disability index score of 20% or less for the LEFS to assist with reaching prior level of function. GOAL PROGRESSING  [] Progressing: [] Met: [] Not Met: [] Adjusted  2.  Patient will demonstrate increased AROM to WNL to allow skilled PT. Treatment/Activity Tolerance:  [x] Patient able to complete treatment   [] Patient limited by fatigue  [] Patient limited by pain     [] Patient limited by other medical complications  [] Other:       PLAN: See eval  REDUCING TO 1X/WEEK DUE TO COVID-19 : RETURN TO 2X/WEEK AS ABLE   [x] Continue per plan of care [x] Alter current plan (see comments above)  [] Plan of care initiated [] Hold pending MD visit [] Discharge    Electronically signed by:  Priscilla Burger PT, DPT    Note: If patient does not return for scheduled/ recommended follow up visits, this note will serve as a discharge from care along with most recent update on progress.

## 2020-03-23 NOTE — PATIENT INSTRUCTIONS
Eat low potassium foods, no salt substitute. Drink 4oz OJ every day when swallowing iron pill to help absorption. Patient Education        Potassium-Restricted Diet: Care Instructions  Your Care Instructions    Potassium is a mineral. It helps keep the right mix of fluids in your body. It also helps your nerves and muscles work as they should. Most people get the potassium they need from the foods they eat. But if you have certain health problems, such as kidney disease, you may need to be careful about how much potassium you get. This is because too much potassium can be harmful. You can control how much potassium you get. You can do this if you eat foods that don't have much of it and you don't eat foods that have lots of it. Follow-up care is a key part of your treatment and safety. Be sure to make and go to all appointments, and call your doctor if you are having problems. It's also a good idea to know your test results and keep a list of the medicines you take. How can you care for yourself at home? · Limit foods that are high in potassium. Potassium is in many foods, such as vegetables, fruits, and milk products. High-potassium foods include:  ? Fruits such as bananas, oranges, and cantaloupe. ? Tomatoes. ? Broccoli. ? Milk. ? Spinach. ? Potatoes and sweet potatoes. · Eat foods that don't have as much potassium. These low-potassium foods include:  ? Fruits such as applesauce, pineapple, grapes, blueberries, and raspberries. ? Cucumbers. ? Hummus. ? White or brown rice. ? Spaghetti. ? Tortillas. ? Macaroni. · Do not use a salt substitute or \"lite\" salt unless you talk to your doctor first. These often are very high in potassium. · Be sure to tell your doctor about any prescription or over-the-counter medicines you are taking. Some medicines can increase the potassium in your body. Where can you learn more? Go to [email or web address] org and sign in to your [email or web address] account.

## 2020-03-26 ENCOUNTER — APPOINTMENT (OUTPATIENT)
Dept: PHYSICAL THERAPY | Age: 80
End: 2020-03-26
Payer: MEDICARE

## 2020-03-30 ENCOUNTER — HOSPITAL ENCOUNTER (OUTPATIENT)
Dept: PHYSICAL THERAPY | Age: 80
Setting detail: THERAPIES SERIES
Discharge: HOME | End: 2020-03-30
Payer: MEDICARE

## 2020-03-31 ENCOUNTER — CARE COORDINATION (OUTPATIENT)
Dept: CASE MANAGEMENT | Age: 80
End: 2020-03-31

## 2020-04-03 ENCOUNTER — APPOINTMENT (OUTPATIENT)
Dept: PHYSICAL THERAPY | Age: 80
End: 2020-04-03
Payer: MEDICARE

## 2020-04-07 ENCOUNTER — HOSPITAL ENCOUNTER (OUTPATIENT)
Dept: PHYSICAL THERAPY | Age: 80
Setting detail: THERAPIES SERIES
Discharge: HOME | End: 2020-04-07
Payer: MEDICARE

## 2020-04-07 PROCEDURE — 97110 THERAPEUTIC EXERCISES: CPT

## 2020-04-07 PROCEDURE — 97530 THERAPEUTIC ACTIVITIES: CPT

## 2020-04-07 NOTE — VIRTUAL HEALTH
[address] [facility] Physical Therapy  Phone: [phone]   Fax: [phone] Visit   Physical Therapy [facility] Note/ Remote Session:       Date:  2020    Patient Name:  Misa Scott    :  1940  MRN: [id number]  Restrictions/Precautions:    Medical/Treatment Diagnosis Information:  · Diagnosis: E99.251 (ICD-10-CM) - Status post total hip replacement, left  · Treatment Diagnosis: M25.552 Pain in Left Hip   Insurance/Certification information:  PT Insurance Information: Medicare  Physician Information:  Referring Practitioner: Edison Salazar MD    Justification for Tele Health Visit:   Due to the  [address] Emergency patients undergoing Outpatient Rehabilitation Services were offered non - traditional Tele-Therapy follow up visits with their treating clinician to maintain continuity of care. Pursuant to the emergency declaration under the [address] [employer], [address] [place] [address] authority and the "I AND [employer] and [employer] Act, this Virtual Visit was conducted, with patient's consent, to reduce the patient's risk of exposure to COVID-19 and provide continuity of care for a patient. Services were provided through a video synchronous discussion virtually to substitute for in-person appointment. Distant Site/ place-of-service:  Patient home    Communication: [] Audio via telephone  [] Patient verbally agreed to telemedicine Informed Consent for Telephonic Rehab Follow-up via telephone        [x] Video via Facetime   [x] Patient verbally agreed to telemedicine Informed Consent for Tele-Health Rehab Follow-up via Facetime      Tele Visit #   1     Current Pain Level Reported:  0/10     SUBJECTIVE:  Pt reports she currently has bronchitis and is having some breathing difficulty when she over exerts herself.  She reports she is still doing something daily, but not as frequently as she was in the past. She states she should be cleared from the MD to return to PT next week. Reports she is scared to walk without her walker because she is afraid of falling. She does have a cane that she will bring to her next PT session. OBJECTIVE:    Observation via FaceTime: improved gait with RW, increased WBing through L LE during all upright exercises     Reviewed Interventions: Ther Ex (46945-75)  sets/sec reps notes   Seated HS stretch 30\" 3    Standing Marches 2 10 R/L Improved WBing through L LE   Standing HS curl 2 10 R/L    Standing Hip Ext 2 10 R/L    Standing Hip AB 2  2 10 L  5 R Cueing for slower mvmt to improve control (sig trunk SB)   HR/TR 2 10 B    Mini Squat at counter 2 10    Side Steps at counter Length of kitchen 1 Patient fatigues by end of counter top walk and unable to complete the requested 3 reps               NMR (68769-91)                         TA (73982-69)      Gait education 5'     Sit<>stand education 2'     HEP Education 4'  See below       Patient education: patient provided with encouragement to cont HEP as able with good airflow and breathing, added counter top side stepping to HEP to perform as tolerated (patient fatigued out during session). Pt instructed to perform 3 bouts of 20 minutes of exercises each day to help increase strength. 3 sessions to be divided into morning supine/sidelying exercises in bed, afternoon stretching and standing exercises, and evening stretching and balance exercises.        Home Exercise Program ([employer]): emailed to patient      Home Exercise Program Instruction and Review Completed Remotely:    [x] (25122-56) Reviewed/Progressed HEP activities related to strengthening, flexibility, endurance, ROM of core, proximal hip and LE for functional self-care, mobility, lifting and ambulation   [] (34609-05) Reviewed/Progressed HEP activities related to improving balance, coordination, kinesthetic sense, posture, motor skill, proprioception of core, proximal hip and LE for self-care, mobility, lifting, and ambulation    [] (16734-09) Reviewed/Progressed HEP functional activities related to improving bending, lifting, carrying, reaching catching and overhead activities to help improve and maintain functional performance needed for work and home related activities. Charges for Tele Visit:  [facility] Treatment Minutes: 30         Completed Remotely:   [x] TE 78963-(02) x 1       [] NMR 76030-(02) x        [x] TA 94111-(02) x 1         [] Other:      ASSESSMENT:  Pt able to attend video telehealth call without complications via assistance of daughter. Pt able to complete all exercises outlined above with minimal compensations needing cueing for slower and controlled movements coming from the hip and decreasing spinal mobility. Pt fatigues out by end of session and is unable to complete 3 reps of counter side stepping requesting seated rest break. Session ended after 30 minutes of exercises and HEP education/updates. Tele Health:  [x] Patient Progressing with HEP and utilizing [employer] without issue   [] Patient noncompliant with [employer] [] Patient requires in-person therapy due to not progressing    [] Other:     Virtual Visit: Overall Progression with At Home Instructions:  [x] Review of current status and compliance with recommended home program  [x] Review of functional activities and status with ADL   [x] Review of patient understanding of injury, goals and expected outcomes   [] Other:    Patient requires further Tele Health intervention to maintain current progress:   [x] Yes (if unable to come into clinic) [] No    PLAN: Re-check with Patient via Tele health to ensure proper HEP compliance so impairments do not worsen.       [x] Continue Tele Health visits (as needed) [] Discontinue Tele health  [x] Patient should resume in-person therapy due to skill required (if approved by MD)     [] Hold pending MD visit     Electronically signed by:

## 2020-04-15 ENCOUNTER — HOSPITAL ENCOUNTER (OUTPATIENT)
Dept: PHYSICAL THERAPY | Age: 80
Setting detail: THERAPIES SERIES
Discharge: HOME | End: 2020-04-15
Payer: MEDICARE

## 2020-04-15 ENCOUNTER — CARE COORDINATION (OUTPATIENT)
Dept: CASE MANAGEMENT | Age: 80
End: 2020-04-15

## 2020-04-15 PROCEDURE — 97530 THERAPEUTIC ACTIVITIES: CPT

## 2020-04-15 PROCEDURE — 97110 THERAPEUTIC EXERCISES: CPT

## 2020-04-15 NOTE — FLOWSHEET NOTE
abduction  - 3/5 VJMBJMHF:  Seated Gastroc Stretch with Strap, Gastroc Stretch on Wall, Seated Hamstring Stretch, Standing Hamstring Stretch with Step, Supine Active Straight Leg Raise, Supine March, Bent Knee Fallouts, Hooklying Clamshell with Resistance, Supine Gluteal Set, Supine Hip Adduction Isometric with Ball, Supine Bridge, Seated [place], Standing Knee Flexion AROM with Chair Support, Standing Heel Raise with Support    Access Code: [email or web address]   URL: [employer]/   Date: 03/23/2020   Prepared by: Jeronimo Vidales     Exercises   Clamshell - 10 reps - 3 sets - 1x daily - 7x weekly   Supine Hip Abduction - 10 reps - 3 sets - 1x daily - 7x weekly   Standing Hip Abduction with Counter Support - 10 reps - 3 sets - 1x daily - 7x weekly   Standing Hip Extension with Counter Support - 10 reps - 3 sets - 1x daily - 7x weekly   Standing March with Counter Support - 10 reps - 3 sets - 1x daily - 7x weekly   Sidelying Hip Abduction - 10 reps - 3 sets - 1x daily - 7x weekly     Therapeutic Exercise and NMR EXR  [x] (92529) Provided verbal/tactile cueing for activities related to strengthening, flexibility, endurance, ROM for improvements in LE, proximal hip, and core control with self care, mobility, lifting, ambulation. [x] (28916) Provided verbal/tactile cueing for activities related to improving balance, coordination, kinesthetic sense, posture, motor skill, proprioception  to assist with LE, proximal hip, and core control in self care, mobility, lifting, ambulation and eccentric single leg control.      NMR and Therapeutic Activities:    [x] (82006 or 71007) Provided verbal/tactile cueing for activities related to improving balance, coordination, kinesthetic sense, posture, motor skill, proprioception and motor activation to allow for proper function of core, proximal hip and LE with self care and ADLs  [] (19775) Gait Re-education- Provided training and instruction to the patient for proper LE, core decrease in pain to facilitate improvement in movement, function, and ADLs as indicated by Functional Deficits. GOAL PROGRESSING  [] Progressing: [x] Met: [] Not Met: [] Adjusted    Long Term Goals: To be achieved in: 12 weeks  1. Disability index score of 20% or less for the LEFS to assist with reaching prior level of function. GOAL PROGRESSING  [x] Progressing: [] Met: [] Not Met: [] Adjusted  2. Patient will demonstrate increased AROM to WNL to allow for proper joint functioning as indicated by patients Functional Deficits. GOAL PROGRESSING  [x] Progressing: [] Met: [] Not Met: [] Adjusted  3. Patient will demonstrate an increase in Strength to good proximal hip strength and control, within 5lb HHD in LE to allow for proper functional mobility as indicated by patients Functional Deficits. GOAL PROGRESSING  [x] Progressing: [] Met: [] Not Met: [] Adjusted  4. Patient will return to Independent functional activities without increased symptoms or restriction. GOAL PROGRESSING  [x] Progressing: [] Met: [] Not Met: [] Adjusted    Overall Progression Towards Functional goals/ Treatment Progress Update:  [] Patient is progressing as expected towards functional goals listed. [] Progression is slowed due to complexities/Impairments listed. [x] Progression has been slowed due to co-morbidities. [] Plan just implemented, too soon to assess goals progression <30days   [] Goals require adjustment due to lack of progress  [] Patient is not progressing as expected and requires additional follow up with physician  [] Other    Prognosis for POC: [x] Good [] Fair  [] Poor    Patient requires continued skilled intervention: [x] Yes  [] No    Assessment: Pt is a 79 yo female referred to PT s/p L ANGELES performed 2/4/20. She is now 10 wks post op. She is doing well for 10 wks post however, but continues to present with significant deficits strength, balance, gait pattern, and transfers.  Patient does well with progression of

## 2020-04-22 ENCOUNTER — HOSPITAL ENCOUNTER (OUTPATIENT)
Dept: PHYSICAL THERAPY | Age: 80
Setting detail: THERAPIES SERIES
Discharge: HOME | End: 2020-04-22
Payer: MEDICARE

## 2020-04-22 PROCEDURE — 97530 THERAPEUTIC ACTIVITIES: CPT

## 2020-04-22 PROCEDURE — 97110 THERAPEUTIC EXERCISES: CPT

## 2020-04-22 NOTE — FLOWSHEET NOTE
[address] [facility]    [facility] Daily Treatment Note  Date:  2020    Patient Name:  Maryellen Hunter    :  1940  MRN: [id number]  Restrictions/Precautions:    Medical/Treatment Diagnosis Information:  Diagnosis: G65.569 (ICD-10-CM) - Status post total hip replacement, left  Treatment Diagnosis: M25.552 Pain in Left Hip  Insurance/Certification information:  PT Insurance Information: Medicare  Physician Information:  Referring Practitioner: Estrella Krueger MD  Has the plan of care been signed (Y/N):        [x]  Yes  []  No     Date of Patient follow up with Physician: 2020    Is this a Progress Report:     []  Yes  [x]  No      If Yes:  Date Range for reporting period:  Beginnin2020  Endin/15/2020    Progress report will be due (10 Rx or 30 days whichever is less):      Recertification will be due (POC Duration  / 90 days whichever is less):  2020 (12 weeks)    Visit # Insurance Allowable Auth Required   13 MEDICARE CAP []  Yes [x]  No        Functional Scale: LEFS raw 28 = 65% LOF   Date assessed: 4/15/2020      Latex Allergy:  [x]NO      []YES  Preferred Language for Healthcare:   [x]English       []other:      Pain level:  0/10      SUBJECTIVE:   Patient reports she is doing well today. Continued compliance with HEP. Denies pain.      OBJECTIVE:   4/15:   Gait: ambulation with SPC; decreased L stance time, decreased gait speed  MMT L LE: hip flex: 4/5, hip abd: 3-/5  SOB with quantum machine exercises and sit to stand    RESTRICTIONS/PRECAUTIONS: L ANGELES  2020; no flexion > 90, no ER, no hip abd strengthening until > 4 weeks start with ISOs and gravity assisted; against gravity > 6 weeks    Exercises/Interventions:   Therapeutic Exercise (41268)  Resistance / level Sets/sec Reps Notes / Cues   Standing Step HS Stretch  30\" 3 L    IB - Calf Stretch  30\" 3 L       HEP      HEP       HEP       HEP Gluteal Set, Supine Hip Adduction Isometric with Ball, Supine Bridge, Seated [place], Standing Knee Flexion AROM with Chair Support, Standing Heel Raise with Support    Access Code: [email or web address]   URL: [email or web address]/   Date: 03/23/2020   Prepared by: Joy Licea     Exercises   Clamshell - 10 reps - 3 sets - 1x daily - 7x weekly   Supine Hip Abduction - 10 reps - 3 sets - 1x daily - 7x weekly   Standing Hip Abduction with Counter Support - 10 reps - 3 sets - 1x daily - 7x weekly   Standing Hip Extension with Counter Support - 10 reps - 3 sets - 1x daily - 7x weekly   Standing March with Counter Support - 10 reps - 3 sets - 1x daily - 7x weekly   Sidelying Hip Abduction - 10 reps - 3 sets - 1x daily - 7x weekly     Therapeutic Exercise and NMR EXR  [x] (61085) Provided verbal/tactile cueing for activities related to strengthening, flexibility, endurance, ROM for improvements in LE, proximal hip, and core control with self care, mobility, lifting, ambulation. [x] (67314) Provided verbal/tactile cueing for activities related to improving balance, coordination, kinesthetic sense, posture, motor skill, proprioception  to assist with LE, proximal hip, and core control in self care, mobility, lifting, ambulation and eccentric single leg control.      NMR and Therapeutic Activities:    [x] (42802 or 26674) Provided verbal/tactile cueing for activities related to improving balance, coordination, kinesthetic sense, posture, motor skill, proprioception and motor activation to allow for proper function of core, proximal hip and LE with self care and ADLs  [] (61408) Gait Re-education- Provided training and instruction to the patient for proper LE, core and proximal hip recruitment and positioning and eccentric body weight control with ambulation re-education including up and down stairs     Home Exercise Program:    [x] (99156) Reviewed/Progressed HEP activities related to strengthening, flexibility, endurance,

## 2020-04-27 ENCOUNTER — OFFICE VISIT (OUTPATIENT)
Dept: ORTHOPEDIC SURGERY | Age: 80
End: 2020-04-27

## 2020-04-27 VITALS — BODY MASS INDEX: 29.82 KG/M2 | HEIGHT: 67 IN | WEIGHT: 190 LBS | TEMPERATURE: 95 F

## 2020-04-27 PROCEDURE — 99024 POSTOP FOLLOW-UP VISIT: CPT | Performed by: ORTHOPAEDIC SURGERY

## 2020-04-27 NOTE — PROGRESS NOTES
[address]  Office Visit  Post-Op Visit  Date:  2020    Name:  Vanessa Ribera  Address:  [address] [facility] [address]    :  1940      Age:   78 y.o.    SSN:  [id number]      Medical Record Number:  [id number]    Chief Complaint:    Post Op Visit    HPI:   Vanessa Ribera is a 78 y.o. female who presents for post-op visit after left total hip arthroplasty from direct lateral approach with Dr. Unruly Winchester 2020. She is working with PT. She is still weak in abduction and using a cane. She is off pain medication. Her main complaint is trouble sleeping, but not due to pain. She denies fevers, chills, chest pain, shortness of breath, or any other symptoms           Review of Systems: A full review of systems was completed. Today's review pertinent items are noted in HPI. Physical Exam:  Temp 95 °F (35 °C)   Ht 5' 7\" (1.702 m)   Wt 190 lb (86.2 kg)   BMI 29.76 kg/m²     General: No acute distress, well nourished  CV: No obvious peripheral edema. Normal peripheral pulses  Neuro: Alert & oriented x 3  Psych: Normal mood and affect    Hip Examination: Left     Inspection: Incision healing well  Palpation: There is no crepitus, no tenderness  Range of Motion:  Flexion 90 , IR 20 ER 40 at 90°. Strength:  2/5 hip abduction, 4/5 flexion  Stability: No instability  Neuro: Sensation equal bilateral lower extremities   Vascular: 2+ posterior tibialis pulse    Radiographic:  No new imaging    Assessment:  Vanessa Ribera is a 78 y.o. female s/p  1. Left total hip arthroplasty via direct lateral approach with Dr. Unruly Winchester 2020      Impression:  Encounter Diagnosis   Name Primary?  Status post total hip replacement, left Yes       Office Procedures:  No orders of the defined types were placed in this encounter. Plan:   She is doing well about 12 weeks out  Pain is minimal  Working with PT.  Emphasis on regaining strength, especially

## 2020-04-29 ENCOUNTER — HOSPITAL ENCOUNTER (OUTPATIENT)
Dept: PHYSICAL THERAPY | Age: 80
Setting detail: THERAPIES SERIES
Discharge: HOME | End: 2020-04-29
Payer: MEDICARE

## 2020-04-29 PROCEDURE — 97530 THERAPEUTIC ACTIVITIES: CPT | Performed by: PHYSICAL THERAPIST

## 2020-04-29 PROCEDURE — 97110 THERAPEUTIC EXERCISES: CPT | Performed by: PHYSICAL THERAPIST

## 2020-04-29 PROCEDURE — 97140 MANUAL THERAPY 1/> REGIONS: CPT | Performed by: PHYSICAL THERAPIST

## 2020-04-29 NOTE — FLOWSHEET NOTE
Cues   Recumbent bike  5'     Standing Step HS Stretch  30\" 3 L    IB - Calf Stretch  30\" 3 L    SLR - Flexion 2# 3 10  ^4/29   Bridge W/ PS 3 10 ^4/29   Supine hip abd  Added 3/19   Clamshells 2# 2 20 ^4/15    Sidelying Hip AB AAROM 3 5 ^4/15 start, requires Min-ModA for performance          HR/TR DL  3 10    Quantum Knee Ext DL 35# 3 10 ^3/23 progressed weight; unable to do 40# on 4/15   Quantum HS Curl DL 45# 3 10 ^4/15 progressed weight   Leg press DL 60# 3 10 Start 4/29                 Therapeutic Activities (32868)       Sit <> Stand from chair W/out UE support  Standard chair 1/1 10/5 Cueing to equal WB          Step Up - Fwd 6\" 2 10     Step Up - Lateral    Side Stepping HHA 10 feet 2 laps ^4/15          Neuromuscular Re-ed (90909)       Staggered Stance on AirEx  30\" x2 R/L                  Manual Intervention (70852)       Hip PROM      Knee PROM      STM incision  8'     Ankle mobs                       HEP  - 3/3: HEP add HR/TR in standing, hooklying hip abduction  - 3/5 VJMBJMHF:  Seated Gastroc Stretch with Strap, Gastroc Stretch on Wall, Seated Hamstring Stretch, Standing Hamstring Stretch with Step, Supine Active Straight Leg Raise, Supine March, Bent Knee Fallouts, Hooklying Clamshell with Resistance, Supine Gluteal Set, Supine Hip Adduction Isometric with Ball, Supine Bridge, Seated [facility], Standing Knee Flexion AROM with Chair Support, Standing Heel Raise with Support    Access Code: [email or web address]   URL: [email or web address]/   Date: 03/23/2020   Prepared by: Casa Lemus     Exercises   Clamshell - 10 reps - 3 sets - 1x daily - 7x weekly   Supine Hip Abduction - 10 reps - 3 sets - 1x daily - 7x weekly   Standing Hip Abduction with Counter Support - 10 reps - 3 sets - 1x daily - 7x weekly   Standing Hip Extension with Counter Support - 10 reps - 3 sets - 1x daily - 7x weekly   Standing March with Counter Support - 10 reps - 3 sets - 1x daily - 7x weekly   Sidelying Hip Abduction - 10 reps

## 2020-05-06 ENCOUNTER — HOSPITAL ENCOUNTER (OUTPATIENT)
Dept: PHYSICAL THERAPY | Age: 80
Setting detail: THERAPIES SERIES
Discharge: HOME | End: 2020-05-06
Payer: MEDICARE

## 2020-05-06 PROCEDURE — 97530 THERAPEUTIC ACTIVITIES: CPT | Performed by: PHYSICAL THERAPIST

## 2020-05-06 PROCEDURE — 97140 MANUAL THERAPY 1/> REGIONS: CPT | Performed by: PHYSICAL THERAPIST

## 2020-05-06 PROCEDURE — 97110 THERAPEUTIC EXERCISES: CPT | Performed by: PHYSICAL THERAPIST

## 2020-05-06 NOTE — FLOWSHEET NOTE
Standing Hip Extension with Counter Support - 10 reps - 3 sets - 1x daily - 7x weekly   Standing March with Counter Support - 10 reps - 3 sets - 1x daily - 7x weekly   Sidelying Hip Abduction - 10 reps - 3 sets - 1x daily - 7x weekly     Therapeutic Exercise and NMR EXR  [x] (53661) Provided verbal/tactile cueing for activities related to strengthening, flexibility, endurance, ROM for improvements in LE, proximal hip, and core control with self care, mobility, lifting, ambulation. [x] (24944) Provided verbal/tactile cueing for activities related to improving balance, coordination, kinesthetic sense, posture, motor skill, proprioception  to assist with LE, proximal hip, and core control in self care, mobility, lifting, ambulation and eccentric single leg control.      NMR and Therapeutic Activities:    [x] (79483 or 04304) Provided verbal/tactile cueing for activities related to improving balance, coordination, kinesthetic sense, posture, motor skill, proprioception and motor activation to allow for proper function of core, proximal hip and LE with self care and ADLs  [] (93867) Gait Re-education- Provided training and instruction to the patient for proper LE, core and proximal hip recruitment and positioning and eccentric body weight control with ambulation re-education including up and down stairs     Home Exercise Program:    [x] (08033) Reviewed/Progressed HEP activities related to strengthening, flexibility, endurance, ROM of core, proximal hip and LE for functional self-care, mobility, lifting and ambulation/stair navigation   [] (00176)Reviewed/Progressed HEP activities related to improving balance, coordination, kinesthetic sense, posture, motor skill, proprioception of core, proximal hip and LE for self care, mobility, lifting, and ambulation/stair navigation      Manual Treatments:  PROM / STM / Oscillations-Mobs:  G-I, II, III, IV (PA's, Inf., Post.)  [x] (35601) Provided manual therapy to mobilize LE, proximal hip and/or LS spine soft tissue/joints for the purpose of modulating pain, promoting relaxation,  increasing ROM, reducing/eliminating soft tissue swelling/inflammation/restriction, improving soft tissue extensibility and allowing for proper ROM for normal function with self care, mobility, lifting and ambulation. Modalities:      Charges:  Timed Code Treatment Minutes: 62   Total Treatment Minutes: 62       [] EVAL (LOW) 35894 (typically 20 minutes face-to-face)  [] EVAL (MOD) 11580 (typically 30 minutes face-to-face)  [] EVAL (HIGH) 57228 (typically 45 minutes face-to-face)  [] RE-EVAL   [x] UA(24386) x  2    [] IONTO  [] NMR (81543) x      [] VASO  [x] Manual (13822) x1      [] Other:  [x] TA x 1      [] Mech Traction (40113)  [] ES(attended) (09536)      [] ES (un) (87600):     GOALS:   Patient stated goal: Return to normal activities   [x] Progressing: [] Met: [] Not Met: [] Adjusted    Therapist goals for Patient:   Short Term Goals: To be achieved in: 2 weeks  1. Independent in HEP and progression per patient tolerance, in order to prevent re-injury. GOAL PROGRESSING  [x] Progressing: [] Met: [] Not Met: [] Adjusted   2. Patient will have a decrease in pain to facilitate improvement in movement, function, and ADLs as indicated by Functional Deficits. GOAL PROGRESSING  [] Progressing: [x] Met: [] Not Met: [] Adjusted    Long Term Goals: To be achieved in: 12 weeks  1. Disability index score of 20% or less for the LEFS to assist with reaching prior level of function. GOAL PROGRESSING  [x] Progressing: [] Met: [] Not Met: [] Adjusted  2. Patient will demonstrate increased AROM to WNL to allow for proper joint functioning as indicated by patients Functional Deficits. GOAL PROGRESSING  [x] Progressing: [] Met: [] Not Met: [] Adjusted  3.  Patient will demonstrate an increase in Strength to good proximal hip strength and control, within 5lb HHD in LE to allow for proper functional mobility as indicated by patients Functional Deficits. GOAL PROGRESSING  [x] Progressing: [] Met: [] Not Met: [] Adjusted  4. Patient will return to Independent functional activities without increased symptoms or restriction. GOAL PROGRESSING  [x] Progressing: [] Met: [] Not Met: [] Adjusted    Overall Progression Towards Functional goals/ Treatment Progress Update:  [] Patient is progressing as expected towards functional goals listed. [] Progression is slowed due to complexities/Impairments listed. [x] Progression has been slowed due to co-morbidities. [] Plan just implemented, too soon to assess goals progression <30days   [] Goals require adjustment due to lack of progress  [] Patient is not progressing as expected and requires additional follow up with physician  [] Other    Prognosis for POC: [x] Good [] Fair  [] Poor    Patient requires continued skilled intervention: [x] Yes  [] No    Assessment: Pt. Tolerated therapy today with complaints of fatigue only. Pt. Continues to have soft tissue tightness in lateral hip addressed with manual treatment. Initiated supported hip flexor stretch this date without issue. Pt. Appropriately challenged by strengthening activities. Hip abductor weakness persisting and assist required for SLR abduction. Multiple seated rest breaks required throughout therapy session this date. Pt. Continues to require cueing to improve control with step up. Pt. Requires continued progression of post-op protocol with functional strengthening and balance training to improve community navigation.      Treatment/Activity Tolerance:  [x] Patient able to complete treatment   [] Patient limited by fatigue  [] Patient limited by pain     [] Patient limited by other medical complications  [] Other:       PLAN: See eval  REDUCING TO 1X/WEEK DUE TO COVID-19   [x] Continue per plan of care [x] Alter current plan (see comments above)  [] Plan of care initiated [] Hold pending MD visit []

## 2020-05-13 ENCOUNTER — HOSPITAL ENCOUNTER (OUTPATIENT)
Dept: PHYSICAL THERAPY | Age: 80
Setting detail: THERAPIES SERIES
Discharge: HOME | End: 2020-05-13
Payer: MEDICARE

## 2020-05-13 PROCEDURE — 97110 THERAPEUTIC EXERCISES: CPT | Performed by: PHYSICAL THERAPIST

## 2020-05-13 PROCEDURE — 97530 THERAPEUTIC ACTIVITIES: CPT | Performed by: PHYSICAL THERAPIST

## 2020-05-13 NOTE — PLAN OF CARE
Lito Enrique     Physical Therapy Re-Certification Plan of Care    Dear  Dr. Chico Kramer,    We had the pleasure of treating the following patient for physical therapy services at [address] [place] [address]. A summary of our findings can be found in the updated assessment below. This includes our plan of care. If you have any questions or concerns regarding these findings, please do not hesitate to contact me at the office phone number checked above. Thank you for the referral.     Physician Signature:________________________________Date:__________________  By signing above (or electronic signature), therapists plan is approved by physician      Functional Outcome: LEFS raw 26 = 68% LOF    Overall Response to Treatment:   [x]Patient is responding well to treatment and improvement is noted with regards  to goals   [x]Patient should continue to improve in reasonable time if they continue HEP   []Patient has plateaued and is no longer responding to skilled PT intervention    []Patient is getting worse and would benefit from return to referring MD   []Patient unable to adhere to initial POC   [x]Other: Pt. Continues to present with weakness and general deconditioning limiting gait and ambulation. Pt. Will continue to benefit from skilled therapy in order to restore strength to allow for return to ambulation without AD. Date range of Visits: 2020-2020  Total Visits: 16    Recommendation:    [x]Continue PT 1x / wk for 4-6 weeks.     []Hold PT, pending MD visit        Physical Therapy Daily Treatment Note  Date:  2020    Patient Name:  Geri Lester    :  1940  MRN: [id number]  Restrictions/Precautions:    Medical/Treatment Diagnosis Information:  Diagnosis: X21.149 (ICD-10-CM) - Status post total hip replacement, left  Treatment Diagnosis: M25.552 Pain in Left Hip  Insurance/Certification information:  PT Insurance lowering, manual assist for lifting          HR/TR    Quantum Knee Ext DL 40# 3 10 ^5/13   Quantum HS Curl DL 45# 3 10 ^4/15    Leg press DL 65# 3 10 ^5/13                 Therapeutic Activities (37296)       Sit <> Stand from chair W/out UE support  Standard chair 1 10           Step Up - Fwd Cueing for control, UE support required for balance   Step Up - Lateral    Side Stepping HHA 10 feet 2 laps ^4/15   stairs npv                    Neuromuscular Re-ed (56036)          Hip hiking HHA 2 10 Start 5/6   biodex balance PS L12 2'  Start 5/13                 Manual Intervention (25898)       Hip PROM      Knee PROM      STM incision/lateral hip  5'     Ankle mobs                       HEP  - 3/3: HEP add HR/TR in standing, hooklying hip abduction  - 3/5 [id number]:  Seated Gastroc Stretch with Strap, Gastroc Stretch on Wall, Seated Hamstring Stretch, Standing Hamstring Stretch with Step, Supine Active Straight Leg Raise, Supine March, Bent Knee Fallouts, Hooklying Clamshell with Resistance, Supine Gluteal Set, Supine Hip Adduction Isometric with Ball, Supine Bridge, Seated [place], Standing Knee Flexion AROM with Chair Support, Standing Heel Raise with Support    Access Code: [email or web address]   URL: [email or web address]   Date: 03/23/2020   Prepared by: Francesca Gardner     Exercises   Clamshell - 10 reps - 3 sets - 1x daily - 7x weekly   Supine Hip Abduction - 10 reps - 3 sets - 1x daily - 7x weekly   Standing Hip Abduction with Counter Support - 10 reps - 3 sets - 1x daily - 7x weekly   Standing Hip Extension with Counter Support - 10 reps - 3 sets - 1x daily - 7x weekly   Standing March with Counter Support - 10 reps - 3 sets - 1x daily - 7x weekly   Sidelying Hip Abduction - 10 reps - 3 sets - 1x daily - 7x weekly     Therapeutic Exercise and NMR EXR  [x] (67779) Provided verbal/tactile cueing for activities related to strengthening, flexibility, endurance, ROM for improvements in LE, proximal hip, and core (LOW) 03171 (typically 20 minutes face-to-face)  [] EVAL (MOD) 99010 (typically 30 minutes face-to-face)  [] EVAL (HIGH) 62850 (typically 45 minutes face-to-face)  [] RE-EVAL   [x] DZ(29428) x  3    [] IONTO  [] NMR (16612) x      [] VASO  [] Manual (99624) x      [] Other:  [x] TA x 1      [] Mech Traction (47873)  [] ES(attended) (68824)      [] ES (un) (29677):     GOALS:   Patient stated goal: Return to normal activities   [x] Progressing: [] Met: [] Not Met: [] Adjusted    Therapist goals for Patient:   Short Term Goals: To be achieved in: 2 weeks  1. Independent in HEP and progression per patient tolerance, in order to prevent re-injury. [] Progressing: [x] Met: [] Not Met: [] Adjusted   2. Patient will have a decrease in pain to facilitate improvement in movement, function, and ADLs as indicated by Functional Deficits. [] Progressing: [x] Met: [] Not Met: [] Adjusted    Long Term Goals: To be achieved in: 12 weeks  1. Disability index score of 20% or less for the LEFS to assist with reaching prior level of function. [x] Progressing: [] Met: [] Not Met: [] Adjusted  2. Patient will demonstrate increased AROM to WNL to allow for proper joint functioning as indicated by patients Functional Deficits. [x] Progressing: [] Met: [] Not Met: [] Adjusted  3. Patient will demonstrate an increase in Strength to good proximal hip strength and control, within 5lb HHD in LE to allow for proper functional mobility as indicated by patients Functional Deficits. [x] Progressing: [] Met: [] Not Met: [] Adjusted  4. Patient will return to Independent functional activities without increased symptoms or restriction. [x] Progressing: [] Met: [] Not Met: [] Adjusted    Overall Progression Towards Functional goals/ Treatment Progress Update:  [] Patient is progressing as expected towards functional goals listed. [] Progression is slowed due to complexities/Impairments listed.   [x] Progression has been slowed due to

## 2020-05-20 ENCOUNTER — APPOINTMENT (OUTPATIENT)
Dept: PHYSICAL THERAPY | Age: 80
End: 2020-05-20
Payer: MEDICARE

## 2020-05-27 ENCOUNTER — HOSPITAL ENCOUNTER (OUTPATIENT)
Dept: PHYSICAL THERAPY | Age: 80
Setting detail: THERAPIES SERIES
Discharge: HOME | End: 2020-05-27
Payer: MEDICARE

## 2020-05-27 PROCEDURE — 97110 THERAPEUTIC EXERCISES: CPT | Performed by: PHYSICAL THERAPIST

## 2020-05-27 PROCEDURE — 97530 THERAPEUTIC ACTIVITIES: CPT | Performed by: PHYSICAL THERAPIST

## 2020-05-27 NOTE — FLOWSHEET NOTE
(29966) Provided manual therapy to mobilize LE, proximal hip and/or LS spine soft tissue/joints for the purpose of modulating pain, promoting relaxation,  increasing ROM, reducing/eliminating soft tissue swelling/inflammation/restriction, improving soft tissue extensibility and allowing for proper ROM for normal function with self care, mobility, lifting and ambulation. Modalities:      Charges:  Timed Code Treatment Minutes: 55   Total Treatment Minutes: 55       [] EVAL (LOW) 49599 (typically 20 minutes face-to-face)  [] EVAL (MOD) 72676 (typically 30 minutes face-to-face)  [] EVAL (HIGH) 32105 (typically 45 minutes face-to-face)  [] RE-EVAL   [x] BS(41801) x  3    [] IONTO  [] NMR (22558) x      [] VASO  [] Manual (84393) x      [] Other:  [x] TA x 1      [] Mech Traction (12133)  [] ES(attended) (50089)      [] ES (un) (94808):     GOALS:   Patient stated goal: Return to normal activities   [x] Progressing: [] Met: [] Not Met: [] Adjusted    Therapist goals for Patient:   Short Term Goals: To be achieved in: 2 weeks  1. Independent in HEP and progression per patient tolerance, in order to prevent re-injury. [] Progressing: [x] Met: [] Not Met: [] Adjusted   2. Patient will have a decrease in pain to facilitate improvement in movement, function, and ADLs as indicated by Functional Deficits. [] Progressing: [x] Met: [] Not Met: [] Adjusted    Long Term Goals: To be achieved in: 12 weeks  1. Disability index score of 20% or less for the LEFS to assist with reaching prior level of function. [x] Progressing: [] Met: [] Not Met: [] Adjusted  2. Patient will demonstrate increased AROM to WNL to allow for proper joint functioning as indicated by patients Functional Deficits. [x] Progressing: [] Met: [] Not Met: [] Adjusted  3.  Patient will demonstrate an increase in Strength to good proximal hip strength and control, within 5lb HHD in LE to allow for proper functional mobility as indicated by patients

## 2020-06-03 ENCOUNTER — HOSPITAL ENCOUNTER (OUTPATIENT)
Dept: PHYSICAL THERAPY | Age: 80
Setting detail: THERAPIES SERIES
Discharge: HOME | End: 2020-06-03
Payer: MEDICARE

## 2020-06-03 PROCEDURE — 97530 THERAPEUTIC ACTIVITIES: CPT | Performed by: PHYSICAL THERAPIST

## 2020-06-03 PROCEDURE — 97110 THERAPEUTIC EXERCISES: CPT | Performed by: PHYSICAL THERAPIST

## 2020-06-03 NOTE — FLOWSHEET NOTE
[facility]      Physical Therapy Daily Treatment Note  Date:  6/3/2020    Patient Name:  Chiquita Canavan    :  1940  MRN: [id number]  Restrictions/Precautions:    Medical/Treatment Diagnosis Information:  Diagnosis: Z96.642 (ICD-10-CM) - Status post total hip replacement, left  Treatment Diagnosis: M25.552 Pain in Left Hip  Insurance/Certification information:  PT Insurance Information: Medicare  Physician Information:  Referring Practitioner: Kailee Dos Santos MD  Has the plan of care been signed (Y/N):        [x]  Yes  []  No     Date of Patient follow up with Physician:     Is this a Progress Report:     []  Yes  [x]  No      If Yes:  Date Range for reporting period:  Beginnin2020  Ending:     Progress report will be due (10 Rx or 30 days whichever is less):  2978    Recertification will be due (POC Duration  / 90 days whichever is less):  2020    Visit # Insurance Allowable Auth Required   18 MEDICARE CAP []  Yes [x]  No        Functional Scale: LEFS raw 26 = 68% LOF   Date assessed: 2020      Latex Allergy:  [x]NO      []YES  Preferred Language for Healthcare:   [x]English       []other:      Pain level:  0/10      SUBJECTIVE:  Pt. Denies pain in her hip at this time. Pt. Notes that she fell  evening when rushing to the bathroom and bruised her arm and knee. Pt. Denies impact to her hip. Pt. Reports compliance with HEP. Pt. States that she has been walking more and doing some yard work.      OBJECTIVE:   :   Gait: ambulation with SPC; decreased L stance time, decreased gait speed, mild hip drop (hip drop increases significantly without AD)  30 sec sit to stand: 8    Strength  Left Right Comments   Hip flexors  4  4+  seated   Hip abduction  3  4  sidelying   Quads  5 5      Hamstrings  4 4+            RESTRICTIONS/PRECAUTIONS: L ANGELES  2020; no flexion > 90, no ER, no hip abd strengthening until > 7x weekly   Standing Hip Abduction with Counter Support - 10 reps - 3 sets - 1x daily - 7x weekly   Standing Hip Extension with Counter Support - 10 reps - 3 sets - 1x daily - 7x weekly   Standing March with Counter Support - 10 reps - 3 sets - 1x daily - 7x weekly   Sidelying Hip Abduction - 10 reps - 3 sets - 1x daily - 7x weekly     Therapeutic Exercise and NMR EXR  [x] (27268) Provided verbal/tactile cueing for activities related to strengthening, flexibility, endurance, ROM for improvements in LE, proximal hip, and core control with self care, mobility, lifting, ambulation. [x] (92832) Provided verbal/tactile cueing for activities related to improving balance, coordination, kinesthetic sense, posture, motor skill, proprioception  to assist with LE, proximal hip, and core control in self care, mobility, lifting, ambulation and eccentric single leg control.      NMR and Therapeutic Activities:    [x] (68442 or 37216) Provided verbal/tactile cueing for activities related to improving balance, coordination, kinesthetic sense, posture, motor skill, proprioception and motor activation to allow for proper function of core, proximal hip and LE with self care and ADLs  [] (14274) Gait Re-education- Provided training and instruction to the patient for proper LE, core and proximal hip recruitment and positioning and eccentric body weight control with ambulation re-education including up and down stairs     Home Exercise Program:    [x] (39157) Reviewed/Progressed HEP activities related to strengthening, flexibility, endurance, ROM of core, proximal hip and LE for functional self-care, mobility, lifting and ambulation/stair navigation   [] (40937)Reviewed/Progressed HEP activities related to improving balance, coordination, kinesthetic sense, posture, motor skill, proprioception of core, proximal hip and LE for self care, mobility, lifting, and ambulation/stair navigation      Manual Treatments:  PROM / STM / Oscillations-Mobs:  G-I, II, III, IV (PA's, Inf., Post.)  [x] (73935) Provided manual therapy to mobilize LE, proximal hip and/or LS spine soft tissue/joints for the purpose of modulating pain, promoting relaxation,  increasing ROM, reducing/eliminating soft tissue swelling/inflammation/restriction, improving soft tissue extensibility and allowing for proper ROM for normal function with self care, mobility, lifting and ambulation. Modalities:      Charges:  Timed Code Treatment Minutes: 50   Total Treatment Minutes: 50       [] EVAL (LOW) 20770 (typically 20 minutes face-to-face)  [] EVAL (MOD) 60981 (typically 30 minutes face-to-face)  [] EVAL (HIGH) 63962 (typically 45 minutes face-to-face)  [] RE-EVAL   [x] FE(72843) x  2    [] IONTO  [] NMR (81652) x      [] VASO  [] Manual (12712) x      [] Other:  [x] TA x 1      [] Mech Traction (01861)  [] ES(attended) (57664)      [] ES (un) (57958):     GOALS:   Patient stated goal: Return to normal activities   [x] Progressing: [] Met: [] Not Met: [] Adjusted    Therapist goals for Patient:   Short Term Goals: To be achieved in: 2 weeks  1. Independent in HEP and progression per patient tolerance, in order to prevent re-injury. [] Progressing: [x] Met: [] Not Met: [] Adjusted   2. Patient will have a decrease in pain to facilitate improvement in movement, function, and ADLs as indicated by Functional Deficits. [] Progressing: [x] Met: [] Not Met: [] Adjusted    Long Term Goals: To be achieved in: 12 weeks  1. Disability index score of 20% or less for the LEFS to assist with reaching prior level of function. [x] Progressing: [] Met: [] Not Met: [] Adjusted  2. Patient will demonstrate increased AROM to WNL to allow for proper joint functioning as indicated by patients Functional Deficits. [x] Progressing: [] Met: [] Not Met: [] Adjusted  3.  Patient will demonstrate an increase in Strength to good proximal hip strength and control, within 5lb HHD in LE to allow for proper functional mobility as indicated by patients Functional Deficits. [x] Progressing: [] Met: [] Not Met: [] Adjusted  4. Patient will return to Independent functional activities without increased symptoms or restriction. [x] Progressing: [] Met: [] Not Met: [] Adjusted    Overall Progression Towards Functional goals/ Treatment Progress Update:  [] Patient is progressing as expected towards functional goals listed. [] Progression is slowed due to complexities/Impairments listed. [x] Progression has been slowed due to co-morbidities. [] Plan just implemented, too soon to assess goals progression <30days   [] Goals require adjustment due to lack of progress  [] Patient is not progressing as expected and requires additional follow up with physician  [] Other    Prognosis for POC: [x] Good [] Fair  [] Poor    Patient requires continued skilled intervention: [x] Yes  [] No    Assessment: Pt. Tolerated therapy today with complaints of fatigue only. Pt. Continues to present with general deconditioning and weakness limiting progression of exercises. Improved control noted with stairs and balance this date. No obvious issues from fall earlier this week. Pt. continues to be limited in range with hip abduction against gravity due to weakness. Pt. Appropriately challenged by PRE machines. Plan to return in 2 wks for possible d/c to home program if pt. Continues to progress as expected.      Treatment/Activity Tolerance:  [x] Patient able to complete treatment   [] Patient limited by fatigue  [] Patient limited by pain     [] Patient limited by other medical complications  [] Other:         PLAN: 1x/wk for 4-6wks  [x] Continue per plan of care [] Alter current plan (see comments above)  [] Plan of care initiated [] Hold pending MD visit [] Discharge    Electronically signed by:  Renetta Ruiz PT, DPT    Note: If patient does not return for scheduled/ recommended follow up visits, this note will serve

## 2020-06-08 ENCOUNTER — OFFICE VISIT (OUTPATIENT)
Dept: ORTHOPEDIC SURGERY | Age: 80
End: 2020-06-08
Payer: MEDICARE

## 2020-06-08 VITALS — WEIGHT: 190 LBS | TEMPERATURE: 97.1 F | BODY MASS INDEX: 29.82 KG/M2 | HEIGHT: 67 IN

## 2020-06-08 PROCEDURE — G8427 DOCREV CUR MEDS BY ELIG CLIN: HCPCS | Performed by: ORTHOPAEDIC SURGERY

## 2020-06-08 PROCEDURE — 1036F TOBACCO NON-USER: CPT | Performed by: ORTHOPAEDIC SURGERY

## 2020-06-08 PROCEDURE — G8400 PT W/DXA NO RESULTS DOC: HCPCS | Performed by: ORTHOPAEDIC SURGERY

## 2020-06-08 PROCEDURE — 1090F PRES/ABSN URINE INCON ASSESS: CPT | Performed by: ORTHOPAEDIC SURGERY

## 2020-06-08 PROCEDURE — 1123F ACP DISCUSS/DSCN MKR DOCD: CPT | Performed by: ORTHOPAEDIC SURGERY

## 2020-06-08 PROCEDURE — 99212 OFFICE O/P EST SF 10 MIN: CPT | Performed by: ORTHOPAEDIC SURGERY

## 2020-06-08 PROCEDURE — 4040F PNEUMOC VAC/ADMIN/RCVD: CPT | Performed by: ORTHOPAEDIC SURGERY

## 2020-06-08 PROCEDURE — G8417 CALC BMI ABV UP PARAM F/U: HCPCS | Performed by: ORTHOPAEDIC SURGERY

## 2020-06-08 NOTE — PROGRESS NOTES
None    Social Needs      Financial resource strain: None      Food insecurity        Worry: None        Inability: None      Transportation needs        Medical: None        Non-medical: None    Tobacco Use      Smoking status: Former Smoker        Packs/day: 0.25        Types: E-Cigarettes        Quit date: 2020        Years since quittin.3      Smokeless tobacco: Never Used      Tobacco comment: vape not cigarettes    Substance and Sexual Activity      Alcohol use: No      Drug use: No      Sexual activity: None    Lifestyle      Physical activity        Days per week: None        Minutes per session: None      Stress: None    Relationships      Social connections        Talks on phone: None        Gets together: None        Attends [employer] service: None        Active member of club or organization: None        Attends meetings of clubs or organizations: None        Relationship status: None      Intimate partner violence        Fear of current or ex partner: None        Emotionally abused: None        Physically abused: None        Forced sexual activity: None    Other Topics      Concerns:        None    Social History Narrative      None      Current Outpatient Medications:  hydroCHLOROthiazide (HYDRODIURIL) 25 MG tablet, Take 1 tablet by mouth every morning, Disp: 30 tablet, Rfl: 3  sodium bicarbonate 650 MG tablet, Take 1 tablet by mouth daily one daily, Disp: 90 tablet, Rfl: 0  tamsulosin (FLOMAX) 0.4 MG capsule, Take 1 capsule by mouth daily, Disp: 10 capsule, Rfl: 0  Cetirizine HCl (ZYRTEC ALLERGY PO), Take 1 tablet by mouth daily, Disp: , Rfl:   senna-docusate (PERICOLACE) 8.6-50 MG per tablet, Take 2 tablets by mouth as needed for Constipation, Disp: , Rfl:   Insulin Degludec (TRESIBA FLEXTOUCH SC), Inject 32 Units into the skin every morning, Disp: , Rfl:   ferrous sulfate (SLOW FE) 142 (45 Fe) MG extended release tablet, Take 1 tablet by mouth daily , Disp: , Rfl:   atorvastatin (LIPITOR) 80

## 2020-06-17 ENCOUNTER — HOSPITAL ENCOUNTER (OUTPATIENT)
Dept: PHYSICAL THERAPY | Age: 80
Setting detail: THERAPIES SERIES
Discharge: HOME | End: 2020-06-17
Payer: MEDICARE

## 2020-06-17 PROCEDURE — 97530 THERAPEUTIC ACTIVITIES: CPT | Performed by: PHYSICAL THERAPIST

## 2020-06-17 PROCEDURE — 97110 THERAPEUTIC EXERCISES: CPT | Performed by: PHYSICAL THERAPIST

## 2020-06-23 LAB
ALBUMIN SERPL-MCNC: 4.2 G/DL (ref 3.5–5)
ANION GAP SERPL CALCULATED.3IONS-SCNC: 10 MMOL/L (ref 6–18)
BUN BLDV-MCNC: 38 MG/DL (ref 8–26)
CALCIUM SERPL-MCNC: 9 MG/DL (ref 8.5–10.5)
CHLORIDE BLD-SCNC: 102 MEQ/L (ref 101–111)
CO2: 25 MMOL/L (ref 24–36)
CREAT SERPL-MCNC: 1.86 MG/DL (ref 0.44–1.03)
GFR AFRICAN AMERICAN: 28 ML/MIN/1.73 M2
GFR NON-AFRICAN AMERICAN: 25 ML/MIN/1.73 M2
GLUCOSE BLD-MCNC: 100 MG/DL (ref 70–99)
HCT VFR BLD CALC: 32.6 % (ref 36–46)
HEMOGLOBIN: 10.9 G/DL (ref 12–15.2)
MCH RBC QN AUTO: 29.1 PG (ref 27–33)
MCHC RBC AUTO-ENTMCNC: 33.3 G/DL (ref 32–36)
MCV RBC AUTO: 87.3 FL (ref 82–97)
PARATHYROID HORMONE INTACT: 117 PG/ML (ref 15–65)
PDW BLD-RTO: 14.3 %
PHOSPHORUS: 3.9 MG/DL (ref 2.5–4.5)
PLATELET # BLD: 246 THOU/MCL (ref 140–375)
PMV BLD AUTO: 7 FL (ref 7.4–11.5)
POTASSIUM SERPL-SCNC: 4.2 MEQ/L (ref 3.6–5.1)
RBC # BLD: 3.74 MIL/MCL (ref 3.8–5.2)
SODIUM BLD-SCNC: 137 MEQ/L (ref 135–145)
WBC # BLD: 5.9 THOU/MCL (ref 3.6–10.5)

## 2020-08-10 ENCOUNTER — OFFICE VISIT (OUTPATIENT)
Dept: ORTHOPEDIC SURGERY | Age: 80
End: 2020-08-10
Payer: MEDICARE

## 2020-08-10 VITALS — BODY MASS INDEX: 28.72 KG/M2 | WEIGHT: 183 LBS | TEMPERATURE: 97.2 F | HEIGHT: 67 IN

## 2020-08-10 PROCEDURE — 1036F TOBACCO NON-USER: CPT | Performed by: ORTHOPAEDIC SURGERY

## 2020-08-10 PROCEDURE — 99213 OFFICE O/P EST LOW 20 MIN: CPT | Performed by: ORTHOPAEDIC SURGERY

## 2020-08-10 PROCEDURE — G8400 PT W/DXA NO RESULTS DOC: HCPCS | Performed by: ORTHOPAEDIC SURGERY

## 2020-08-10 PROCEDURE — G8417 CALC BMI ABV UP PARAM F/U: HCPCS | Performed by: ORTHOPAEDIC SURGERY

## 2020-08-10 PROCEDURE — 1123F ACP DISCUSS/DSCN MKR DOCD: CPT | Performed by: ORTHOPAEDIC SURGERY

## 2020-08-10 PROCEDURE — 4040F PNEUMOC VAC/ADMIN/RCVD: CPT | Performed by: ORTHOPAEDIC SURGERY

## 2020-08-10 PROCEDURE — G8427 DOCREV CUR MEDS BY ELIG CLIN: HCPCS | Performed by: ORTHOPAEDIC SURGERY

## 2020-08-10 PROCEDURE — 1090F PRES/ABSN URINE INCON ASSESS: CPT | Performed by: ORTHOPAEDIC SURGERY

## 2020-08-10 NOTE — PROGRESS NOTES
[address]  Office Visit  Follow up  Date:  8/10/2020    Name:  Tika Ruth  Address:  [address] [facility] [address]    :  1940      Age:   78 y.o.    SSN:  [id number]      Medical Record Number:  [id number]    Chief Complaint:    Follow up for a left hip replacement done in 2020    HPI:   Tika Ruth is a 78 y.o. female who is presents for six-month follow-up regarding her left total hip arthroplasty. She states she is still doing her exercises and has minimal pain. She states she is back to resuming her normal activities including mowing her lawn without issues. Occasionally has some lower lumbar aching but otherwise no issues. She denies any new numbness, tingling, fevers, chills, chest pain, shortness of breath, or any other new significant symptoms.          Past History:  Past Medical History:   Diagnosis Date    Arthritis     Blind left eye     CAD (coronary artery disease)     Chronic back pain     Diabetes mellitus ([address]     Hearing loss     both ears    Hyperlipidemia     Hypertension     PONV (postoperative nausea and vomiting)        Past Surgical History:   Procedure Laterality Date    COLONOSCOPY      CORONARY ANGIOPLASTY WITH STENT PLACEMENT      2 stents     EPIDURAL STEROID INJECTION Bilateral 2019    BILATERAL L4 TRANSFORAMINAL EPIDURAL STEROID INJECTION WITH FLUOROSCOPY performed by Christian Cordon MD at [facility] N/A 3/27/2019    L4/L5 INTERLAMINAR EPIDURAL STEROID INJECTION WITH FLUOROSCOPY performed by Christian Cordon MD at [address] [facility] Right     PROSTHETIC EYE     LUMBAR NERVE BLOCK N/A 2019    MIDLINE L4L5 INTERLAMINAR EPIDURAL STEROID INJECTION WITH FLUOROSCOPY performed by Christian Cordon MD at [facility] Left 2020    LEFT TOTAL HIP ARTHROPLASTY performed by Stephen Stone MD at [address] [facility] History     Tobacco Use    Smoking status: Former Smoker     Packs/day: 0.25     Types: E-Cigarettes     Last attempt to quit: 2020     Years since quittin.5    Smokeless tobacco: Never Used    Tobacco comment: vape not cigarettes   Substance Use Topics    Alcohol use: No    Drug use: No        Family History:  family history includes Diabetes in her sister. Current Outpatient Medications:     hydroCHLOROthiazide (HYDRODIURIL) 25 MG tablet, TAKE 1 TABLET BY MOUTH ONCE DAILY IN THE MORNING, Disp: 30 tablet, Rfl: 3    montelukast (SINGULAIR) 10 MG tablet, Take 10 mg by mouth daily, Disp: , Rfl:     sodium bicarbonate 650 MG tablet, Take 1 tablet by mouth once daily, Disp: 90 tablet, Rfl: 1    tamsulosin (FLOMAX) 0.4 MG capsule, Take 1 capsule by mouth daily, Disp: 10 capsule, Rfl: 0    senna-docusate (PERICOLACE) 8.6-50 MG per tablet, Take 2 tablets by mouth as needed for Constipation, Disp: , Rfl:     Insulin Degludec (TRESIBA FLEXTOUCH SC), Inject 32 Units into the skin every morning, Disp: , Rfl:     ferrous sulfate (SLOW FE) 142 (45 Fe) MG extended release tablet, Take 1 tablet by mouth daily , Disp: , Rfl:     atorvastatin (LIPITOR) 80 MG tablet, Take 80 mg by mouth nightly , Disp: , Rfl:     venlafaxine (EFFEXOR XR) 150 MG extended release capsule, Take 150 mg by mouth nightly , Disp: , Rfl:     nebivolol (BYSTOLIC) 5 MG tablet, Take 2.5 mg by mouth daily , Disp: , Rfl:     alprazolam (XANAX) 0.5 MG tablet, Take 0.5 mg by mouth 2 times daily. , Disp: , Rfl:     aspirin 81 MG EC tablet, Take 1 tablet by mouth 2 times daily for 28 days, Disp: 56 tablet, Rfl: 0    clopidogrel (PLAVIX) 75 MG tablet, Take 75 mg by mouth daily. , Disp: , Rfl:       Allergies   Allergen Reactions    Cephalexin Other (See Comments) and Itching     Other reaction(s): Skin Rash    Doxycycline Nausea And Vomiting and Rash     Nausea and vomiting      Levofloxacin Hives    Levofloxacin Other (See Comments) and Hives    Sulfa Antibiotics Other (See Comments) and Nausea Only     Bactrim      Ezetimibe Other (See Comments)    Metformin Other (See Comments)     Abdominal pain    Misoprostol Other (See Comments)     Abdominal pain  Abdominal pain      Naproxen Other (See Comments)     Abdominal pain  Abdominal pain       Simvastatin Other (See Comments)    Trazodone Other (See Comments)    Trazodone Hcl Other (See Comments)    Actos [Pioglitazone Hydrochloride] Nausea Only    Bactrim Nausea Only    Cephalexin Itching    Ezetimibe-Simvastatin Nausea Only and Other (See Comments)    Lisinopril Nausea Only and Other (See Comments)    Pioglitazone Other (See Comments) and Nausea Only    Sulfamethoxazole-Trimethoprim Nausea Only    Trazodone And Nefazodone Nausea Only         Review of Systems: 10 point review systems reviewed and is negative unless otherwise stated in HPI. No notes on file    Physical Exam:  Temp 97.2 °F (36.2 °C)   Ht 5' 7\" (1.702 m)   Wt 183 lb (83 kg)   BMI 28.66 kg/m²       General: No acute distress, well nourished  CV: No obvious peripheral edema. Normal peripheral pulses  Neuro: Alert & oriented x 3  Psych: Normal mood and affect    Left hip exam-    No tenderness palpation over the greater trochanteric region  Symmetrical hip abduction strength at 5/5 compared contralateral side  5/5 hip flexion, similar when compared with contralateral side  Hip can actually rotate to 45 degrees, internally rotate to roughly 40 degrees  No pain with passive internal or external rotation      Radiographic:  No new imaging today    Assessment:  Eder Dupree is a 78 y.o. female with:  1. Status post left total hip arthroplasty    Impression:  Encounter Diagnosis   Name Primary?  Status post total hip replacement, left Yes       Office Procedures:  No orders of the defined types were placed in this encounter. Plan:   Pertinent imaging was reviewed.  The etiology, natural history, and treatment options for the disorder were discussed. The roles of activity modifications, medications, injections, physical therapy, and surgical interventions were all described to the patient and questions were answered. Patient is doing well 6 months postoperatively from a left total hip arthroplasty. Her strength has returned and we did discuss her continuing to work on her exercises. Otherwise she can continue activities as tolerated. She can follow-up on an as-needed basis. [address] Fellow    The encounter with Santiago Higginbotham was supervised by Dr Brittani Landry who personally examined the patient and reviewed the plan. This dictation was performed with a verbal recognition program (DRAGON) and it was checked for errors. It is possible that there are still dictated errors within this office note. If so, please bring any errors to my attention for an addendum. All efforts were made to ensure that this office note is accurate. Attestation:  I was physically present and performed my own examination of this patient and have discussed the case, including pertinent history and exam findings with the fellow. I agree with the documented assessment and plan. Ruth oGmez.  Brittani Landry MD

## 2020-09-28 ENCOUNTER — OFFICE VISIT (OUTPATIENT)
Dept: CARDIOLOGY CLINIC | Age: 80
End: 2020-09-28
Payer: MEDICARE

## 2020-09-28 VITALS
OXYGEN SATURATION: 99 % | DIASTOLIC BLOOD PRESSURE: 64 MMHG | WEIGHT: 188 LBS | BODY MASS INDEX: 29.51 KG/M2 | SYSTOLIC BLOOD PRESSURE: 130 MMHG | HEART RATE: 68 BPM | HEIGHT: 67 IN

## 2020-09-28 PROCEDURE — G8400 PT W/DXA NO RESULTS DOC: HCPCS | Performed by: INTERNAL MEDICINE

## 2020-09-28 PROCEDURE — 1090F PRES/ABSN URINE INCON ASSESS: CPT | Performed by: INTERNAL MEDICINE

## 2020-09-28 PROCEDURE — 1123F ACP DISCUSS/DSCN MKR DOCD: CPT | Performed by: INTERNAL MEDICINE

## 2020-09-28 PROCEDURE — G8427 DOCREV CUR MEDS BY ELIG CLIN: HCPCS | Performed by: INTERNAL MEDICINE

## 2020-09-28 PROCEDURE — G8417 CALC BMI ABV UP PARAM F/U: HCPCS | Performed by: INTERNAL MEDICINE

## 2020-09-28 PROCEDURE — 99214 OFFICE O/P EST MOD 30 MIN: CPT | Performed by: INTERNAL MEDICINE

## 2020-09-28 PROCEDURE — 1036F TOBACCO NON-USER: CPT | Performed by: INTERNAL MEDICINE

## 2020-09-28 PROCEDURE — 4040F PNEUMOC VAC/ADMIN/RCVD: CPT | Performed by: INTERNAL MEDICINE

## 2020-09-28 RX ORDER — INSULIN GLARGINE 100 [IU]/ML
35 INJECTION, SOLUTION SUBCUTANEOUS NIGHTLY
COMMUNITY
Start: 2020-04-30 | End: 2021-04-30

## 2020-09-28 NOTE — LETTER
[address] Cardiology [facility] [place] [address]  Phone: [phone]  Fax: [phone]    Leeroy Almendarez MD        October 1, 2020     [facility] Patient,   [address] [id number] [facility],[address] [address]    Patient: Lexii Cortez  MR Number: [id number]  YOB: 1940  Date of Visit: 9/28/2020    Dear Dr. Vizcaino Patient:    [address] [facility]   Cardiac Evalaution      Patient: Lexii Cortez  YOB: 1940  Date: 9/28/20      CC: CAD     Referring provider: [facility] Patient,     History of Present Illness:  Mrs. Chace Guillaume has a history of coronary disease, hypertension, and hyperlipidemia; she also has diabetes and PVD with most recent testing showing moderate superficial femoral artery stenosis. Angiogram performed 8/19/10 showed widely patent stents in LAD and Diagonal 1. She quit smoking May, 2012, but vaped for a while. She has an artificial right eye. labs 3/16/2020 showed elev K+ and creat; they were ordered by Dr. Curly Locke, and he is aware of the results (per his office). Today, she is here for regular follow up. She states she had a hip replacement and bronchitis since last seen. [employer] walks with a cane. She denies any chest pain, palpitations, dizziness, or edema. She still has hip pain which hinders her exercise ability. Past Medical History:   has a past medical history of Arthritis, Blind left eye, CAD (coronary artery disease), Chronic back pain, Diabetes mellitus ([address] Hearing loss, Hyperlipidemia, Hypertension, and PONV (postoperative nausea and vomiting). Surgical History:   has a past surgical history that includes Coronary angioplasty with stent; Colonoscopy; epidural steroid injection (Bilateral, 2/27/2019); epidural steroid injection (N/A, 3/27/2019); lumbar nerve block (N/A, 8/14/2019); eye surgery (Right); and Total hip arthroplasty (Left, 2/4/2020).      Social History: reports that she quit smoking about 7 months ago. Her smoking use included e-cigarettes. She smoked 0.25 packs per day. She has never used smokeless tobacco. She reports that she does not drink alcohol or use drugs.  for 55 years. Sister  from lung disease    Family History:  family history includes Diabetes in her sister. Allergies:  Cephalexin; Doxycycline; Levofloxacin; Levofloxacin; Sulfa antibiotics; Ezetimibe; Metformin; Misoprostol; Naproxen; Simvastatin; Trazodone; Trazodone hcl; Actos [pioglitazone hydrochloride]; Bactrim; Cephalexin; Ezetimibe-simvastatin; Lisinopril; Pioglitazone; Sulfamethoxazole-trimethoprim; and Trazodone and nefazodone   Current Outpatient Medications on File Prior to Visit   Medication Sig Dispense Refill    insulin glargine (LANTUS SOLOSTAR) 100 UNIT/ML injection pen Inject 35 Units into the skin nightly      hydroCHLOROthiazide (HYDRODIURIL) 25 MG tablet TAKE 1 TABLET BY MOUTH ONCE DAILY IN THE MORNING 30 tablet 3    sodium bicarbonate 650 MG tablet Take 1 tablet by mouth once daily 90 tablet 1    aspirin 81 MG EC tablet Take 1 tablet by mouth 2 times daily for 28 days 56 tablet 0    tamsulosin (FLOMAX) 0.4 MG capsule Take 1 capsule by mouth daily 10 capsule 0    ferrous sulfate (SLOW FE) 142 (45 Fe) MG extended release tablet Take 1 tablet by mouth daily       atorvastatin (LIPITOR) 80 MG tablet Take 80 mg by mouth nightly       venlafaxine (EFFEXOR XR) 150 MG extended release capsule Take 150 mg by mouth nightly       nebivolol (BYSTOLIC) 5 MG tablet Take 2.5 mg by mouth daily       senna-docusate (PERICOLACE) 8.6-50 MG per tablet Take 2 tablets by mouth as needed for Constipation      alprazolam (XANAX) 0.5 MG tablet Take 0.5 mg by mouth 2 times daily. No current facility-administered medications on file prior to visit. Review of Systems:   · Constitutional: there has been no unanticipated weight loss. · Eyes: No visual changes or diplopia. No scleral icterus. · ENT: No Headaches, hearing loss or vertigo. No mouth sores or sore throat. · Cardiovascular: Reviewed in HPI  · Respiratory: No cough or wheezing, no sputum production. No hematemesis. · Gastrointestinal: No abdominal pain, appetite loss, blood in stools. No change in bowel or bladder habits. · Genitourinary: No dysuria, trouble voiding, or hematuria. · Musculoskeletal:  No gait disturbance, weakness or joint complaints. · Integumentary: No rash or pruritis. · Neurological: No headache, diplopia, change in muscle strength, numbness or tingling. No change in gait, balance, coordination, mood, affect, memory, mentation, behavior. · Psychiatric: No anxiety, no depression. · Endocrine: No malaise, fatigue or temperature intolerance. No excessive thirst, fluid intake, or urination. No tremor. · Hematologic/Lymphatic: No abnormal bruising or bleeding, blood clots or swollen lymph nodes. · Allergic/Immunologic: No nasal congestion or hives. Physical Examination:    Vitals:    09/28/20 1337   BP: 130/64   Site: Left Upper Arm   Position: Sitting   Cuff Size: Medium Adult   Pulse: 68   SpO2: 99%   Weight: 188 lb (85.3 kg)   Height: 5' 7\" (1.702 m)     Body mass index is 29.44 kg/m².      Wt Readings from Last 3 Encounters:   09/28/20 188 lb (85.3 kg)   08/10/20 183 lb (83 kg)   06/30/20 183 lb (83 kg)     BP Readings from Last 3 Encounters:   09/28/20 130/64   06/30/20 (!) 135/100   04/21/20 134/73       Constitutional and General Appearance: NAD, appears stated age  Respiratory:  · Normal excursion and expansion without use of accessory muscles  · Resp Auscultation: Normal breath sounds without dullness  Cardiovascular:  · The apical impulses not displaced  · Heart tones are crisp and normal  · Cervical veins are not engorged  · The carotid upstroke is normal in amplitude and contour without delay or bruit  · JVP is not elevated · Peripheral pulses are symmetrical and full  · There is no clubbing, cyanosis of the extremities. · No edema  · Femoral Arteries: 2+ and equal  · Pedal Pulses: 2+ and equal   Abdomen:  · No masses or tenderness  · Liver/Spleen: No Abnormalities Noted  Neurological/Psychiatric:  · Alert and oriented in all spheres  · Moves all extremities well  · Exhibits normal gait balance and coordination  · No abnormalities of mood, affect, memory, mentation, or behavior are noted    Assessment:  1. Coronary artery disease: Stable, no anginal symptoms  -GXT gretchen 12/14> normal perfusion  -Angiogram 8/9/10> patent stents to LAD and Diag-1, normal EF  -GXT Myoview 8/17/10> normal, homogenous uptake of radionuclide activity demonstrated in the left ventricular myocardium during both phased of the exam. Slight decreased activity in the apex on stress images.  -Angiogram 2/5/09> PTCA and stent of complex LAD and diagonal branch of LAD from 90% to less than 10%. 2. Hypertension: Stable. Labs 8/6/20: BUN/creat 37/2.0 She f/w Dr. Mayra Cisneros. Advised to eat low potassium foods, no salt substitute. 3. Hyperlipidemia: Stable on Lipitor 80mg. Labs 11/20/19>  , TRIG 107, HDL 52, LDL 56   4. Carotid stenosis: Stable  Doppler 5/31/16> 16-49% bilateral  Doppler 12/14> 16-49% bilateral    5. Tobacco abuse: patient states she quit May, 2012, but then she vaped for awhile, not now. 6.   DM: Following with Dr Corina Anders    7. Anemia, h/o:  Takes iron pill qd. Advised to drink OJ every day when swallowing pill to help absorption. Consider Aranesp injections. Plan:  Repeat lipids and carotid doppler. No med changes. Encouraged to stay active. FU 6 months. Thank you for allowing me to participate in the care of [facility]. Scribe's attestation: This note was scribed in the presence of Dr Tricia Velasco MD by Marysol Barrios, RADHA. The scribe's documentation has been prepared under my direction and personally reviewed by me in its entirety. I confirm that the note above accurately reflects all work, treatment, procedures, and medical decision making performed by me. If you have questions, please do not hesitate to call me. I look forward to following Leo Ovens along with you.     Sincerely,        Rola Bean MD

## 2020-09-28 NOTE — PROGRESS NOTES
[address] [facility]   Cardiac Evalaution      Patient: Peg Padilla  YOB: 1940  Date: 20      CC: CAD     Referring provider: Lisabeth Spurling, DO    History of Present Illness:  Mrs. Isaac Tang has a history of coronary disease, hypertension, and hyperlipidemia; she also has diabetes and PVD with most recent testing showing moderate superficial femoral artery stenosis. Angiogram performed 8/19/10 showed widely patent stents in LAD and Diagonal 1. She quit smoking May, 2012, but vaped for a while. She has an artificial right eye. labs 3/16/2020 showed elev K+ and creat; they were ordered by Dr. Stacey Begum, and he is aware of the results (per his office). Today, she is here for regular follow up. She states she had a hip replacement and bronchitis since last seen. [facility] walks with a cane. She denies any chest pain, palpitations, dizziness, or edema. She still has hip pain which hinders her exercise ability. Past Medical History:   has a past medical history of Arthritis, Blind left eye, CAD (coronary artery disease), Chronic back pain, Diabetes mellitus ([address] Hearing loss, Hyperlipidemia, Hypertension, and PONV (postoperative nausea and vomiting). Surgical History:   has a past surgical history that includes Coronary angioplasty with stent; Colonoscopy; epidural steroid injection (Bilateral, 2019); epidural steroid injection (N/A, 3/27/2019); lumbar nerve block (N/A, 2019); eye surgery (Right); and Total hip arthroplasty (Left, 2020). Social History:   reports that she quit smoking about 7 months ago. Her smoking use included e-cigarettes. She smoked 0.25 packs per day. She has never used smokeless tobacco. She reports that she does not drink alcohol or use drugs.  for 55 years. Sister  from lung disease    Family History:  family history includes Diabetes in her sister. Allergies:  Cephalexin; Doxycycline; Levofloxacin;  Levofloxacin; Sulfa gait disturbance, weakness or joint complaints. · Integumentary: No rash or pruritis. · Neurological: No headache, diplopia, change in muscle strength, numbness or tingling. No change in gait, balance, coordination, mood, affect, memory, mentation, behavior. · Psychiatric: No anxiety, no depression. · Endocrine: No malaise, fatigue or temperature intolerance. No excessive thirst, fluid intake, or urination. No tremor. · Hematologic/Lymphatic: No abnormal bruising or bleeding, blood clots or swollen lymph nodes. · Allergic/Immunologic: No nasal congestion or hives. Physical Examination:    Vitals:    09/28/20 1337   BP: 130/64   Site: Left Upper Arm   Position: Sitting   Cuff Size: Medium Adult   Pulse: 68   SpO2: 99%   Weight: 188 lb (85.3 kg)   Height: 5' 7\" (1.702 m)     Body mass index is 29.44 kg/m². Wt Readings from Last 3 Encounters:   09/28/20 188 lb (85.3 kg)   08/10/20 183 lb (83 kg)   06/30/20 183 lb (83 kg)     BP Readings from Last 3 Encounters:   09/28/20 130/64   06/30/20 (!) 135/100   04/21/20 134/73       Constitutional and General Appearance: NAD, appears stated age  Respiratory:  · Normal excursion and expansion without use of accessory muscles  · Resp Auscultation: Normal breath sounds without dullness  Cardiovascular:  · The apical impulses not displaced  · Heart tones are crisp and normal  · Cervical veins are not engorged  · The carotid upstroke is normal in amplitude and contour without delay or bruit  · JVP is not elevated  · Peripheral pulses are symmetrical and full  · There is no clubbing, cyanosis of the extremities.   · No edema  · Femoral Arteries: 2+ and equal  · Pedal Pulses: 2+ and equal   Abdomen:  · No masses or tenderness  · Liver/Spleen: No Abnormalities Noted  Neurological/Psychiatric:  · Alert and oriented in all spheres  · Moves all extremities well  · Exhibits normal gait balance and coordination  · No abnormalities of mood, affect, memory, mentation, or behavior are noted    Assessment:  1. Coronary artery disease: Stable, no anginal symptoms  -GXT gretchen 12/14> normal perfusion  -Angiogram 8/9/10> patent stents to LAD and Diag-1, normal EF  -GXT Myoview 8/17/10> normal, homogenous uptake of radionuclide activity demonstrated in the left ventricular myocardium during both phased of the exam. Slight decreased activity in the apex on stress images.  -Angiogram 2/5/09> PTCA and stent of complex LAD and diagonal branch of LAD from 90% to less than 10%. 2. Hypertension: Stable. Labs 8/6/20: BUN/creat 37/2.0 She f/w Dr. Artem Bynum. Advised to eat low potassium foods, no salt substitute. 3. Hyperlipidemia: Stable on Lipitor 80mg. Labs 11/20/19>  , TRIG 107, HDL 52, LDL 56   4. Carotid stenosis: Stable  Doppler 5/31/16> 16-49% bilateral  Doppler 12/14> 16-49% bilateral    5. Tobacco abuse: patient states she quit May, 2012, but then she vaped for awhile, not now. 6.   DM: Following with Dr Shavon Mcmillan    7. Anemia, h/o:  Takes iron pill qd. Advised to drink OJ every day when swallowing pill to help absorption. Consider Aranesp injections. Plan:  Repeat lipids and carotid doppler. No med changes. Encouraged to stay active. FU 6 months. Thank you for allowing me to participate in the care of [facility]. Scribe's attestation: This note was scribed in the presence of Dr Gae Aase MD by Troy King RN. The scribe's documentation has been prepared under my direction and personally reviewed by me in its entirety. I confirm that the note above accurately reflects all work, treatment, procedures, and medical decision making performed by me.

## 2020-10-09 ENCOUNTER — HOSPITAL ENCOUNTER (OUTPATIENT)
Dept: CARDIOLOGY | Age: 80
Discharge: HOME | End: 2020-10-09
Payer: MEDICARE

## 2020-10-09 PROCEDURE — 93880 EXTRACRANIAL BILAT STUDY: CPT

## 2020-10-13 LAB
ALBUMIN SERPL-MCNC: 4.2 G/DL (ref 3.5–5.7)
ANION GAP SERPL CALCULATED.3IONS-SCNC: 10 MMOL/L (ref 6–18)
BILIRUBIN, URINE: NEGATIVE
BUN BLDV-MCNC: 42 MG/DL (ref 8–26)
CALCIUM SERPL-MCNC: 9.3 MG/DL (ref 8.5–10.4)
CHLORIDE BLD-SCNC: 100 MEQ/L (ref 98–111)
CLARITY: CLEAR
CO2: 25 MMOL/L (ref 21–31)
COLOR: YELLOW
CREAT SERPL-MCNC: 2.04 MG/DL (ref 0.6–1.2)
CREATININE URINE: 63.3 MG/DL
GFR AFRICAN AMERICAN: 25 ML/MIN/1.73 M2
GFR NON-AFRICAN AMERICAN: 22 ML/MIN/1.73 M2
GLUCOSE BLD-MCNC: 203 MG/DL (ref 70–99)
GLUCOSE URINE: NEGATIVE
HB: SOURCE: NORMAL
HCT VFR BLD CALC: 34.3 % (ref 36–46)
HEMOGLOBIN: 11.5 G/DL (ref 12–15.2)
KETONES, URINE: NEGATIVE
LEUKOCYTE ESTERASE, URINE: NEGATIVE
MCH RBC QN AUTO: 29.7 PG (ref 27–33)
MCHC RBC AUTO-ENTMCNC: 33.4 G/DL (ref 32–36)
MCV RBC AUTO: 89 FL (ref 82–97)
NITRITE, URINE: NEGATIVE
PARATHYROID HORMONE INTACT: 104 PG/ML (ref 15–65)
PDW BLD-RTO: 13.2 % (ref 12.3–17)
PH, URINE: 6.5 (ref 5–8)
PHOSPHORUS: 3.8 MG/DL (ref 2.5–4.5)
PLATELET # BLD: 245 THOU/MCL (ref 140–375)
PMV BLD AUTO: 6.9 FL (ref 7.4–11.5)
POTASSIUM SERPL-SCNC: 4.6 MEQ/L (ref 3.6–5.1)
PROTEIN, URINE, RANDOM: 10 MG/DL (ref 0–9.9)
PROTEIN, URINE: NEGATIVE
PROTEIN/CREAT RATIO URINE RAN: 0.16 RATIO
RBC # BLD: 3.85 MIL/MCL (ref 3.8–5.2)
RBC URINE: NEGATIVE
SODIUM BLD-SCNC: 135 MEQ/L (ref 135–145)
SPECIFIC GRAVITY UA: 1.01 (ref 1–1.03)
URINE TYPE: NORMAL
UROBILINOGEN, URINE: NORMAL MG/DL
VITAMIN D 25-HYDROXY: 34.3 NG/ML (ref 30–120)
WBC # BLD: 8 THOU/MCL (ref 3.6–10.5)

## 2020-10-22 PROBLEM — N18.4 CKD (CHRONIC KIDNEY DISEASE) STAGE 4, GFR 15-29 ML/MIN (HCC): Status: ACTIVE | Noted: 2019-10-22

## 2021-04-07 NOTE — PROGRESS NOTES
Spoke with Dr Lorelei Mccormack about antibiotic patient should take for bacteria in urine and with all her allergies --  Recommended RX for Macrobid 100mg #20 1 bid --  Rx called to [facility] [address] in [address] patient she is aware
81 mg by mouth daily. , Disp: , Rfl:   clopidogrel (PLAVIX) 75 MG tablet, Take 75 mg by mouth daily. , Disp: , Rfl:      No current facility-administered medications for this visit. -- Cephalexin -- Other (See Comments)    --  Other reaction(s): Skin Rash   -- Doxycycline -- Nausea And Vomiting and Rash    --  Nausea and vomiting   -- Levofloxacin -- Hives   -- Levofloxacin -- Other (See Comments)   -- Sulfa Antibiotics -- Other (See Comments) and Nausea                            Only    --  Bactrim   -- Ezetimibe -- Other (See Comments)   -- Metformin -- Other (See Comments)    --  Abdominal pain   -- Misoprostol -- Other (See Comments)    --  Abdominal pain             Abdominal pain   -- Naproxen -- Other (See Comments)    --  Abdominal pain             Abdominal pain   -- Pioglitazone -- Other (See Comments)   -- Simvastatin -- Other (See Comments)   -- Trazodone -- Other (See Comments)   -- Trazodone Hcl -- Other (See Comments)   -- Actos [Pioglitazone Hydrochloride] -- Nausea Only   -- Bactrim -- Nausea Only   -- Cephalexin -- Itching   -- Ezetimibe-Simvastatin -- Nausea Only and Other (See                            Comments)   -- Lisinopril -- Nausea Only and Other (See                            Comments)   -- Trazodone And Nefazodone -- Nausea Only    VITAL SIGNS:  BP (!) 111/47   Pulse 62   Ht 5' 7\" (1.702 m)   Wt 177 lb (80.3 kg)   BMI 27.72 kg/m²   We discussed the risks, benefits, and alternatives of surgery, including but limited to infection, stiffness, DVT, neurovascular damage, leg length discrepancies, dislocations, fractures, and others. The patient seems understand accept these risks. All questions were answered. We will see her next week for surgery and then about 2 weeks postop.
negative - not suicidal, no depression

## 2021-11-15 ENCOUNTER — CLINICAL DOCUMENTATION (OUTPATIENT)
Dept: OTHER | Age: 81
End: 2021-11-15

## 2022-04-14 ENCOUNTER — OFFICE VISIT (OUTPATIENT)
Dept: CARDIOLOGY CLINIC | Age: 82
End: 2022-04-14
Payer: MEDICARE

## 2022-04-14 VITALS
OXYGEN SATURATION: 99 % | BODY MASS INDEX: 30.05 KG/M2 | WEIGHT: 187 LBS | SYSTOLIC BLOOD PRESSURE: 124 MMHG | HEIGHT: 66 IN | DIASTOLIC BLOOD PRESSURE: 60 MMHG | HEART RATE: 66 BPM

## 2022-04-14 DIAGNOSIS — E78.49 OTHER HYPERLIPIDEMIA: ICD-10-CM

## 2022-04-14 DIAGNOSIS — I25.10 CORONARY ARTERY DISEASE INVOLVING NATIVE CORONARY ARTERY OF NATIVE HEART WITHOUT ANGINA PECTORIS: Primary | ICD-10-CM

## 2022-04-14 DIAGNOSIS — I10 PRIMARY HYPERTENSION: ICD-10-CM

## 2022-04-14 DIAGNOSIS — I65.23 BILATERAL CAROTID ARTERY STENOSIS: ICD-10-CM

## 2022-04-14 PROCEDURE — 1036F TOBACCO NON-USER: CPT | Performed by: INTERNAL MEDICINE

## 2022-04-14 PROCEDURE — G8417 CALC BMI ABV UP PARAM F/U: HCPCS | Performed by: INTERNAL MEDICINE

## 2022-04-14 PROCEDURE — G8400 PT W/DXA NO RESULTS DOC: HCPCS | Performed by: INTERNAL MEDICINE

## 2022-04-14 PROCEDURE — 99214 OFFICE O/P EST MOD 30 MIN: CPT | Performed by: INTERNAL MEDICINE

## 2022-04-14 PROCEDURE — 4040F PNEUMOC VAC/ADMIN/RCVD: CPT | Performed by: INTERNAL MEDICINE

## 2022-04-14 PROCEDURE — 1123F ACP DISCUSS/DSCN MKR DOCD: CPT | Performed by: INTERNAL MEDICINE

## 2022-04-14 PROCEDURE — G8427 DOCREV CUR MEDS BY ELIG CLIN: HCPCS | Performed by: INTERNAL MEDICINE

## 2022-04-14 PROCEDURE — 1090F PRES/ABSN URINE INCON ASSESS: CPT | Performed by: INTERNAL MEDICINE

## 2022-04-14 NOTE — PROGRESS NOTES
[address] [facility]   Cardiac Evalaution      Patient: Jim Ayers  YOB: 1940  Date: 22      CC: CAD     Referring provider: Melba Valdes    History of Present Illness:  Mrs. Abhinav Quevedo has a history of coronary disease, hypertension, and hyperlipidemia; she also has diabetes and PVD with most recent testing showing moderate superficial femoral artery stenosis. Angiogram performed 8/19/10 showed widely patent stents in LAD and Diagonal 1. She quit smoking May, 2012, but vaped for a while. She has an artificial right eye. Today, Ms Abhinav Quevedo states she has been fine. She walks with a cane for balance assist. Piedad Lombard denies chest pain, palpitations, HUNT, dizziness, or edema. She cares for her  that has metastatic colon cancer. Her activity is limited due to hip and sciatica pain. Past Medical History:   has a past medical history of Arthritis, Blind left eye, CAD (coronary artery disease), Chronic back pain, Diabetes mellitus ([address] Hearing loss, Hyperlipidemia, Hypertension, and PONV (postoperative nausea and vomiting). Surgical History:   has a past surgical history that includes Coronary angioplasty with stent; Colonoscopy; epidural steroid injection (Bilateral, 2019); epidural steroid injection (N/A, 3/27/2019); lumbar nerve block (N/A, 2019); eye surgery (Right); and Total hip arthroplasty (Left, 2020). Social History:   reports that she quit smoking about 2 years ago. Her smoking use included e-cigarettes. She smoked 0.25 packs per day. She has never used smokeless tobacco. She reports that she does not drink alcohol and does not use drugs.  for 55 years. Sister  from lung disease    Family History:  family history includes Diabetes in her sister.      Allergies:  Cephalexin, Doxycycline, Levofloxacin, Levofloxacin, Other, Sulfa antibiotics, Ezetimibe, Metformin, Misoprostol, Naproxen, Simvastatin, Trazodone, Trazodone hcl, Actos [pioglitazone hydrochloride], Bactrim, Cephalexin, Ezetimibe-simvastatin, Lisinopril, Pioglitazone, Sulfamethoxazole-trimethoprim, and Trazodone and nefazodone   Current Outpatient Medications on File Prior to Visit   Medication Sig Dispense Refill    ascorbic acid (V-R VITAMIN C) 250 MG tablet Take by mouth daily      Cranberry 1000 MG CAPS one tablet by oral route once daily      Multiple Vitamin (MULTIVITAMIN PO) Take by mouth daily      gabapentin (NEURONTIN) 300 MG capsule Take 300 mg by mouth as needed.  hydroCHLOROthiazide (HYDRODIURIL) 25 MG tablet TAKE 1 TABLET BY MOUTH ONCE DAILY IN THE MORNING 90 tablet 0    aspirin 81 MG EC tablet Take 1 tablet by mouth 2 times daily for 28 days 56 tablet 0    tamsulosin (FLOMAX) 0.4 MG capsule Take 1 capsule by mouth daily 10 capsule 0    atorvastatin (LIPITOR) 80 MG tablet Take 80 mg by mouth nightly       venlafaxine (EFFEXOR XR) 150 MG extended release capsule Take 150 mg by mouth nightly       alprazolam (XANAX) 0.5 MG tablet Take 0.5 mg by mouth 2 times daily.  senna-docusate (PERICOLACE) 8.6-50 MG per tablet Take 2 tablets by mouth as needed for Constipation       No current facility-administered medications on file prior to visit. Review of Systems:   · Constitutional: there has been no unanticipated weight loss. · Eyes: No visual changes or diplopia. No scleral icterus. · ENT: No Headaches, hearing loss or vertigo. No mouth sores or sore throat. · Cardiovascular: Reviewed in HPI  · Respiratory: No cough or wheezing, no sputum production. No hematemesis. · Gastrointestinal: No abdominal pain, appetite loss, blood in stools. No change in bowel or bladder habits. · Genitourinary: No dysuria, trouble voiding, or hematuria. · Musculoskeletal:  No gait disturbance, weakness or joint complaints. · Integumentary: No rash or pruritis. · Neurological: No headache, diplopia, change in muscle strength, numbness or tingling.  No change in gait, balance, coordination, mood, affect, memory, mentation, behavior. · Psychiatric: No anxiety, no depression. · Endocrine: No malaise, fatigue or temperature intolerance. No excessive thirst, fluid intake, or urination. No tremor. · Hematologic/Lymphatic: No abnormal bruising or bleeding, blood clots or swollen lymph nodes. · Allergic/Immunologic: No nasal congestion or hives. Physical Examination:    Vitals:    04/14/22 1350   BP: 124/60   Site: Left Upper Arm   Position: Sitting   Cuff Size: Medium Adult   Pulse: 66   SpO2: 99%   Weight: 187 lb (84.8 kg)   Height: 5' 6\" (1.676 m)     Body mass index is 30.18 kg/m². Wt Readings from Last 3 Encounters:   04/14/22 187 lb (84.8 kg)   04/12/22 182 lb (82.6 kg)   12/06/21 182 lb (82.6 kg)     BP Readings from Last 3 Encounters:   04/14/22 124/60   04/12/22 130/72   12/06/21 139/69       Constitutional and General Appearance: NAD, appears stated age  Respiratory:  · Normal excursion and expansion without use of accessory muscles  · Resp Auscultation: Normal breath sounds without dullness  Cardiovascular:  · The apical impulses not displaced  · Heart tones are crisp and normal  · Cervical veins are not engorged  · The carotid upstroke is normal with a left bruit  · JVP is not elevated  · Peripheral pulses are symmetrical and full  · There is no clubbing, cyanosis of the extremities. · No edema  · Femoral Arteries: 2+ and equal  · Pedal Pulses: 2+ and equal   Abdomen:  · No masses or tenderness  · Liver/Spleen: No Abnormalities Noted  Neurological/Psychiatric:  · Alert and oriented in all spheres  · Moves all extremities well  · Walks with a cane  · No abnormalities of mood, affect, memory, mentation, or behavior are noted    Assessment:  1.  Coronary artery disease: Stable, no anginal symptoms  -GXT gretchen 12/14> normal perfusion  -Angiogram 8/9/10> patent stents to LAD and Diag-1, normal EF  -GXT Myoview 8/17/10> normal, homogenous uptake of radionuclide activity demonstrated in the left ventricular myocardium during both phased of the exam. Slight decreased activity in the apex on stress images.  -Angiogram 2/5/09> PTCA and stent of complex LAD and diagonal branch of LAD from 90% to less than 10%. 2. Hypertension: Stable. Labs 4/622: BUN/creat 39/1.83 She f/w Dr. Antoine Weber. Advised to eat low potassium foods, no salt substitute. 3. Hyperlipidemia: Stable on Lipitor 80mg. Labs 1/18/22>  , TRIG 129, HDL 42, LDL 47   4. Carotid stenosis: will repeat doppler   Doppler 07/1/22>65-10% LICA, <88% MARLEY  Doppler 5/31/16> 16-49% bilateral  Doppler 12/14> 16-49% bilateral     5. Tobacco abuse: patient states she quit May, 2012, but then she vaped for awhile, not now. 6.   DM: Following with Dr Andrés Gross  7. Anemia, h/o:  Takes iron pill qd. Advised to drink OJ every day when swallowing pill to help absorption. H/H 11.7/33.7    Plan:  Repeat carotid doppler. No med changes. FU 6 months. Scribe's attestation: This note was scribed in the presence of Dr Jayson Zhang MD by Yovany Berrios RN. The scribe's documentation has been prepared under my direction and personally reviewed by me in its entirety. I confirm that the note above accurately reflects all work, treatment, procedures, and medical decision making performed by me. Thank you for allowing me to participate in the [facility].

## 2022-05-13 ENCOUNTER — HOSPITAL ENCOUNTER (OUTPATIENT)
Dept: CARDIOLOGY | Age: 82
Discharge: HOME | End: 2022-05-13
Payer: MEDICARE

## 2022-05-13 DIAGNOSIS — I65.23 BILATERAL CAROTID ARTERY STENOSIS: ICD-10-CM

## 2022-05-13 PROCEDURE — 93880 EXTRACRANIAL BILAT STUDY: CPT

## 2022-08-19 ENCOUNTER — TELEPHONE (OUTPATIENT)
Dept: CARDIOLOGY CLINIC | Age: 82
End: 2022-08-19

## 2022-08-19 NOTE — TELEPHONE ENCOUNTER
Pt states she has been having dizziness and falling. Pt states everything turns black and then falls. This has been going on several months and her ENT said this is coming from her Heart.  Please call to advise

## 2022-08-19 NOTE — TELEPHONE ENCOUNTER
Called Ms Rivera. She states she has been dizzy and falling. Her ENT recommended she see Dr Iraida Osorio due to her carotid stenosis. I scheduled Anisa Lopez an appt 8/24/22 to see Dr Iraida Osorio. Anisa Lopez is agreeable to this.

## 2022-08-23 NOTE — PROGRESS NOTES
[address] [facility]   Cardiac Evalaution      Patient: Nilda Johnson  YOB: 1940  Date: 8/24/22      CC: CAD     Referring provider: Rm Lawrence    History of Present Illness:  Mrs. Lorraine Wisdom has a history of coronary disease, hypertension, and hyperlipidemia; she also has diabetes and PVD with most recent testing showing moderate superficial femoral artery stenosis. Angiogram performed 8/19/10 showed widely patent stents in LAD and Diagonal 1. She quit smoking May, 2012, but vaped for a while. She has an artificial right eye. Lorenza called the office stating she has been dizzy and falling for couple months. She states her ENT advised her to follow up with her cardiologist after her \"crystals\" were re-aligned. Lorenza states, today, she isn't really dizzy. She states she fell 2 weeks ago after getting out of bed to go to the bathroom. She stood up and fell. She has not had any difficulty eating or drinking. Lorenza has neuropathy and is unable to get off the floor by herself. Her son helped her get up. She states she has not fallen since. She denies any leg/arm weakness, blurred vision, slurred speech. Past Medical History:   has a past medical history of Arthritis, Blind left eye, CAD (coronary artery disease), Chronic back pain, Diabetes mellitus ([address] Hearing loss, Hyperlipidemia, Hypertension, and PONV (postoperative nausea and vomiting). Surgical History:   has a past surgical history that includes Coronary angioplasty with stent; Colonoscopy; epidural steroid injection (Bilateral, 2/27/2019); epidural steroid injection (N/A, 3/27/2019); lumbar nerve block (N/A, 8/14/2019); eye surgery (Right); and Total hip arthroplasty (Left, 2/4/2020). Social History:   reports that she quit smoking about 2 years ago. Her smoking use included e-cigarettes and cigarettes. She smoked an average of .25 packs per day.  She has never used smokeless tobacco. She reports that she does not drink alcohol and does not use drugs.  for 55 years. Sister  from lung disease    Family History:  family history includes Diabetes in her sister. Allergies:  Cephalexin, Doxycycline, Levofloxacin, Levofloxacin, Other, Sulfa antibiotics, Ezetimibe, Metformin, Misoprostol, Naproxen, Simvastatin, Trazodone, Trazodone hcl, Actos [pioglitazone hydrochloride], Bactrim, Cephalexin, Ezetimibe-simvastatin, Lisinopril, Pioglitazone, Sulfamethoxazole-trimethoprim, and Trazodone and nefazodone   Current Outpatient Medications on File Prior to Visit   Medication Sig Dispense Refill    LANTUS SOLOSTAR 100 UNIT/ML injection pen INJECT 28 UNITS SUBCUTANEOUSLY AT BEDTIME      ascorbic acid (VITAMIN C) 250 MG tablet Take by mouth daily      Cranberry 1000 MG CAPS one tablet by oral route once daily      Multiple Vitamin (MULTIVITAMIN PO) Take by mouth daily      gabapentin (NEURONTIN) 300 MG capsule Take 300 mg by mouth as needed. hydroCHLOROthiazide (HYDRODIURIL) 25 MG tablet TAKE 1 TABLET BY MOUTH ONCE DAILY IN THE MORNING 90 tablet 0    aspirin 81 MG EC tablet Take 1 tablet by mouth 2 times daily for 28 days 56 tablet 0    atorvastatin (LIPITOR) 80 MG tablet Take 80 mg by mouth nightly       venlafaxine (EFFEXOR XR) 150 MG extended release capsule Take 150 mg by mouth nightly       alprazolam (XANAX) 0.5 MG tablet Take 0.5 mg by mouth 2 times daily. senna-docusate (PERICOLACE) 8.6-50 MG per tablet Take 2 tablets by mouth as needed for Constipation       No current facility-administered medications on file prior to visit. Review of Systems:   Constitutional: there has been no unanticipated weight loss. Eyes: No visual changes or diplopia. No scleral icterus. ENT: No Headaches, hearing loss or vertigo. No mouth sores or sore throat. Cardiovascular: Reviewed in HPI  Respiratory: No cough or wheezing, no sputum production. No hematemesis.     Gastrointestinal: No abdominal pain, appetite loss, blood in stools. No change in bowel or bladder habits. Genitourinary: No dysuria, trouble voiding, or hematuria. Musculoskeletal:  No gait disturbance, weakness or joint complaints. Integumentary: No rash or pruritis. Neurological: No headache, diplopia, change in muscle strength, numbness or tingling.+ change in balance, coordination. No change in mood, affect, memory, mentation, behavior. Psychiatric: No anxiety, no depression. Endocrine: No malaise, fatigue or temperature intolerance. No excessive thirst, fluid intake, or urination. No tremor. Hematologic/Lymphatic: No abnormal bruising or bleeding, blood clots or swollen lymph nodes. Allergic/Immunologic: No nasal congestion or hives. Physical Examination:    Vitals:    08/24/22 1325   BP: 118/64   Site: Left Upper Arm   Position: Sitting   Cuff Size: Medium Adult   Pulse: 70   SpO2: 100%   Weight: 183 lb (83 kg)   Height: 5' 7\" (1.702 m)       Body mass index is 28.66 kg/m². Wt Readings from Last 3 Encounters:   08/24/22 183 lb (83 kg)   08/16/22 185 lb (83.9 kg)   04/14/22 187 lb (84.8 kg)     BP Readings from Last 3 Encounters:   08/24/22 118/64   08/16/22 (!) 126/58   04/14/22 124/60       Constitutional and General Appearance: NAD, appears stated age  Respiratory:  Normal excursion and expansion without use of accessory muscles  Resp Auscultation: Normal breath sounds without dullness  Cardiovascular: The apical impulses not displaced  Heart tones are crisp and normal  Cervical veins are not engorged  The carotid upstroke is normal with a left bruit  JVP is not elevated  Peripheral pulses are symmetrical and full  There is no clubbing, cyanosis of the extremities.   No edema  Femoral Arteries: 2+ and equal  Pedal Pulses: 2+ and equal   Abdomen:  No masses or tenderness  Liver/Spleen: No Abnormalities Noted  Neurological/Psychiatric:  Alert and oriented in all spheres  Moves all extremities well  Walks with a cane  No abnormalities of mood, affect, memory, mentation, or behavior are noted    Assessment:    1. Coronary artery disease: Stable, no anginal symptoms  -GXT gretchen 12/14> normal perfusion  -Angiogram 8/9/10> patent stents to LAD and Diag-1, normal EF  -GXT Myoview 8/17/10> normal, homogenous uptake of radionuclide activity demonstrated in the left ventricular myocardium during both phased of the exam. Slight decreased activity in the apex on stress images.  -Angiogram 2/5/09> PTCA and stent of complex LAD and diagonal branch of LAD from 90% to less than 10%. 2. Hypertension: Stable. Labs 8/8/22: BUN/creat 29/1.76 She f/w Dr. Julian Ocasio. Advised to eat low potassium foods, no salt substitute. 3. Hyperlipidemia: Stable on Lipitor 80mg. Labs 1/18/22>  , TRIG 129, HDL 42, LDL 47   4. Carotid stenosis: her dizziness does not seem to be due to TIAs. Doppler 5/17/22: <50% MARLEY, high range 25-60% LICA  Doppler 55/2/37>45-03% LICA, <60% MARLEY  Doppler 5/31/16> 16-49% bilateral  Doppler 12/14> 16-49% bilateral     5. Tobacco abuse: patient states she quit May, 2012, but then she vaped for awhile, not now. 6.   DM: Following with Dr Gareth Perry  7. Anemia, h/o:  Takes iron pill qd. Advised to drink OJ every day when swallowing pill to help absorption. H/H 11.2/32.9  8. Dizziness/falls - she is orthostatic, will stop HCTZ  Lying b/p 144/60, sitting b/p 124/66, standing b/p 120/60      PLAN: Lyndle Nyhan is orthostatic. She will stop HCTZ. She has been advised to use her walker for assistance with ambulating. Stay hydrated. FU in October as scheduled. Scribe's attestation: This note was scribed in the presence of Dr Beto De Jesus MD by Олег London RN. The scribe's documentation has been prepared under my direction and personally reviewed by me in its entirety. I confirm that the note above accurately reflects all work, treatment, procedures, and medical decision making performed by me.          Thank you for allowing me to participate in the care of Lyndle Nyhan Sadiq.

## 2022-08-24 ENCOUNTER — OFFICE VISIT (OUTPATIENT)
Dept: CARDIOLOGY CLINIC | Age: 82
End: 2022-08-24
Payer: MEDICARE

## 2022-08-24 VITALS
BODY MASS INDEX: 28.72 KG/M2 | SYSTOLIC BLOOD PRESSURE: 118 MMHG | HEIGHT: 67 IN | DIASTOLIC BLOOD PRESSURE: 64 MMHG | HEART RATE: 70 BPM | WEIGHT: 183 LBS | OXYGEN SATURATION: 100 %

## 2022-08-24 DIAGNOSIS — I10 PRIMARY HYPERTENSION: ICD-10-CM

## 2022-08-24 DIAGNOSIS — I65.23 BILATERAL CAROTID ARTERY STENOSIS: ICD-10-CM

## 2022-08-24 DIAGNOSIS — E78.49 OTHER HYPERLIPIDEMIA: ICD-10-CM

## 2022-08-24 DIAGNOSIS — I25.10 CORONARY ARTERY DISEASE INVOLVING NATIVE CORONARY ARTERY OF NATIVE HEART WITHOUT ANGINA PECTORIS: Primary | ICD-10-CM

## 2022-08-24 DIAGNOSIS — R42 DIZZINESS: ICD-10-CM

## 2022-08-24 PROCEDURE — 1123F ACP DISCUSS/DSCN MKR DOCD: CPT | Performed by: INTERNAL MEDICINE

## 2022-08-24 PROCEDURE — G8427 DOCREV CUR MEDS BY ELIG CLIN: HCPCS | Performed by: INTERNAL MEDICINE

## 2022-08-24 PROCEDURE — G8400 PT W/DXA NO RESULTS DOC: HCPCS | Performed by: INTERNAL MEDICINE

## 2022-08-24 PROCEDURE — 1036F TOBACCO NON-USER: CPT | Performed by: INTERNAL MEDICINE

## 2022-08-24 PROCEDURE — 1090F PRES/ABSN URINE INCON ASSESS: CPT | Performed by: INTERNAL MEDICINE

## 2022-08-24 PROCEDURE — 99214 OFFICE O/P EST MOD 30 MIN: CPT | Performed by: INTERNAL MEDICINE

## 2022-08-24 PROCEDURE — G8417 CALC BMI ABV UP PARAM F/U: HCPCS | Performed by: INTERNAL MEDICINE

## 2022-08-24 RX ORDER — INSULIN GLARGINE 100 [IU]/ML
INJECTION, SOLUTION SUBCUTANEOUS
COMMUNITY
Start: 2022-06-25

## 2022-10-19 NOTE — PROGRESS NOTES
[address] [facility]   Cardiac Evalaution      Patient: Richard Carvalho  YOB: 1940  Date: 10/25/22      CC: CAD     Referring provider: Marline Rush    History of Present Illness:  Mrs. Mary Emery has a history of coronary disease, hypertension, and hyperlipidemia; she also has diabetes and PVD with most recent testing showing moderate superficial femoral artery stenosis. Angiogram performed 8/19/10 showed widely patent stents in LAD and Diagonal 1. She quit smoking May, 2012, but vaped for a while. She has an artificial right eye. Marla Still was previously seen for dizziness. She was orthostatic, so HCTZ was discontinued. Today, Ms Mary Emery states she has sinus problems currently. She denies palpitations, HUNT, or edema. She saw an ENT who fixed her dizziness. Marla Still has chest pain \"once in a while\" while at rest. This is not new or different. Past Medical History:   has a past medical history of Arthritis, Blind left eye, CAD (coronary artery disease), Chronic back pain, Diabetes mellitus [address] [address] Hearing loss, Hyperlipidemia, Hypertension, and PONV (postoperative nausea and vomiting). Surgical History:   has a past surgical history that includes Coronary angioplasty with stent; Colonoscopy; epidural steroid injection (Bilateral, 2019); epidural steroid injection (N/A, 3/27/2019); lumbar nerve block (N/A, 2019); eye surgery (Right); and Total hip arthroplasty (Left, 2020). Social History:   reports that she quit smoking about 2 years ago. Her smoking use included e-cigarettes and cigarettes. She smoked an average of .25 packs per day. She has never used smokeless tobacco. She reports that she does not drink alcohol and does not use drugs.  for 55 years. Sister  from lung disease    Family History:  family history includes Diabetes in her sister.      Allergies:  Cephalexin, Doxycycline, Levofloxacin, Levofloxacin, Other, Sulfa antibiotics, Ezetimibe, Metformin, Misoprostol, Naproxen, Simvastatin, Trazodone, Trazodone hcl, Actos [pioglitazone hydrochloride], Bactrim, Cephalexin, Ezetimibe-simvastatin, Lisinopril, Pioglitazone, Sulfamethoxazole-trimethoprim, and Trazodone and nefazodone   Current Outpatient Medications on File Prior to Visit   Medication Sig Dispense Refill    albuterol sulfate HFA (PROVENTIL;VENTOLIN;PROAIR) 108 (90 Base) MCG/ACT inhaler       Dulaglutide (TRULICITY) 0.44 DZ/9.9RP SOPN Inject 0.75 mg into the skin once a week      LANTUS SOLOSTAR 100 UNIT/ML injection pen INJECT 28 UNITS SUBCUTANEOUSLY AT BEDTIME      ascorbic acid (VITAMIN C) 250 MG tablet Take by mouth daily      Cranberry 1000 MG CAPS one tablet by oral route once daily      Multiple Vitamin (MULTIVITAMIN PO) Take by mouth daily      gabapentin (NEURONTIN) 300 MG capsule Take 300 mg by mouth as needed. aspirin 81 MG EC tablet Take 1 tablet by mouth 2 times daily for 28 days 56 tablet 0    senna-docusate (PERICOLACE) 8.6-50 MG per tablet Take 2 tablets by mouth as needed for Constipation      atorvastatin (LIPITOR) 80 MG tablet Take 80 mg by mouth nightly       venlafaxine (EFFEXOR XR) 150 MG extended release capsule Take 150 mg by mouth nightly       alprazolam (XANAX) 0.5 MG tablet Take 0.5 mg by mouth 2 times daily. No current facility-administered medications on file prior to visit. Review of Systems:   Constitutional: there has been no unanticipated weight loss. Eyes: No visual changes or diplopia. No scleral icterus. ENT: No Headaches, hearing loss or vertigo. No mouth sores or sore throat. Cardiovascular: Reviewed in HPI  Respiratory: No cough or wheezing, no sputum production. No hematemesis. Gastrointestinal: No abdominal pain, appetite loss, blood in stools. No change in bowel or bladder habits. Genitourinary: No dysuria, trouble voiding, or hematuria. Musculoskeletal:  No gait disturbance, weakness or joint complaints.   Integumentary: No rash the left ventricular apex may represent artifact or apical thinning. EF 75%  -GXT gretchen 12/14> normal perfusion  -Angiogram 8/9/10> patent stents to LAD and Diag-1, normal EF  -GXT Myoview 8/17/10> normal, homogenous uptake of radionuclide activity demonstrated in the left ventricular myocardium during both phased of the exam. Slight decreased activity in the apex on stress images.  -Angiogram 2/5/09> PTCA and stent of complex LAD and diagonal branch of LAD from 90% to less than 10%. 2. Hypertension: Stable. Labs 8/8/22: BUN/creat 29/1.76 She f/w Dr. Milagros Clarke. Advised to eat low potassium foods, no salt substitute. 3. Hyperlipidemia: Stable on Lipitor 80mg. Labs 1/18/22>  , TRIG 129, HDL 42, LDL 47   4. Carotid stenosis: her dizziness does not seem to be due to TIAs. Doppler 5/17/22: <50% MARLEY, high range 90-30% LICA  Doppler 86/9/50>71-63% LICA, <10% MARLEY  Doppler 5/31/16> 16-49% bilateral  Doppler 12/14> 16-49% bilateral  5. Tobacco abuse: patient states she quit May, 2012, but then she vaped for awhile, not now. 6.   DM: Following with Dr Zoe Rene, labs 10/7/22: A1C 6.8  7. Anemia, h/o:  Takes iron pill qd. Advised to drink OJ every day when swallowing pill to help absorption. H/H 11.2/32.9  8. Dizziness/falls - she was orthostatic, so HCTZ discontinued    PLAN: Maxx Du appears stable. No med changes. FU 6 months with a carotid doppler. Scribe's attestation: This note was scribed in the presence of Dr Mila Amaro MD by Luis Alberto Joshi RN. The scribe's documentation has been prepared under my direction and personally reviewed by me in its entirety. I confirm that the note above accurately reflects all work, treatment, procedures, and medical decision making performed by me. Thank you for allowing me to participate in the [facility].

## 2022-10-25 ENCOUNTER — OFFICE VISIT (OUTPATIENT)
Dept: CARDIOLOGY CLINIC | Age: 82
End: 2022-10-25
Payer: MEDICARE

## 2022-10-25 VITALS
HEART RATE: 70 BPM | DIASTOLIC BLOOD PRESSURE: 60 MMHG | HEIGHT: 67 IN | BODY MASS INDEX: 29.19 KG/M2 | SYSTOLIC BLOOD PRESSURE: 128 MMHG | WEIGHT: 186 LBS | OXYGEN SATURATION: 98 %

## 2022-10-25 DIAGNOSIS — I10 PRIMARY HYPERTENSION: ICD-10-CM

## 2022-10-25 DIAGNOSIS — E78.49 OTHER HYPERLIPIDEMIA: ICD-10-CM

## 2022-10-25 DIAGNOSIS — I65.23 BILATERAL CAROTID ARTERY STENOSIS: ICD-10-CM

## 2022-10-25 DIAGNOSIS — I25.10 CORONARY ARTERY DISEASE INVOLVING NATIVE CORONARY ARTERY OF NATIVE HEART WITHOUT ANGINA PECTORIS: Primary | ICD-10-CM

## 2022-10-25 PROCEDURE — 3078F DIAST BP <80 MM HG: CPT | Performed by: INTERNAL MEDICINE

## 2022-10-25 PROCEDURE — G8427 DOCREV CUR MEDS BY ELIG CLIN: HCPCS | Performed by: INTERNAL MEDICINE

## 2022-10-25 PROCEDURE — 1123F ACP DISCUSS/DSCN MKR DOCD: CPT | Performed by: INTERNAL MEDICINE

## 2022-10-25 PROCEDURE — 99214 OFFICE O/P EST MOD 30 MIN: CPT | Performed by: INTERNAL MEDICINE

## 2022-10-25 PROCEDURE — G8484 FLU IMMUNIZE NO ADMIN: HCPCS | Performed by: INTERNAL MEDICINE

## 2022-10-25 PROCEDURE — 3074F SYST BP LT 130 MM HG: CPT | Performed by: INTERNAL MEDICINE

## 2022-10-25 PROCEDURE — G8400 PT W/DXA NO RESULTS DOC: HCPCS | Performed by: INTERNAL MEDICINE

## 2022-10-25 PROCEDURE — G8417 CALC BMI ABV UP PARAM F/U: HCPCS | Performed by: INTERNAL MEDICINE

## 2022-10-25 PROCEDURE — 1036F TOBACCO NON-USER: CPT | Performed by: INTERNAL MEDICINE

## 2022-10-25 PROCEDURE — 1090F PRES/ABSN URINE INCON ASSESS: CPT | Performed by: INTERNAL MEDICINE

## 2022-10-25 RX ORDER — DULAGLUTIDE 0.75 MG/.5ML
0.75 INJECTION, SOLUTION SUBCUTANEOUS WEEKLY
COMMUNITY

## 2022-10-25 RX ORDER — ALBUTEROL SULFATE 90 UG/1
AEROSOL, METERED RESPIRATORY (INHALATION)
COMMUNITY
Start: 2022-10-07

## 2023-04-25 NOTE — PROGRESS NOTES
[address] [facility]   Cardiac Evalaution      Patient: Milan Patterson  YOB: 1940  Date: 23      CC: CAD     Referring provider: Danyel Lynn    History of Present Illness:  Mrs. Anni Vaca has a history of coronary disease, hypertension, and hyperlipidemia; she also has diabetes and PVD with most recent testing showing moderate superficial femoral artery stenosis. Angiogram performed 8/19/10 showed widely patent stents in LAD and Diagonal 1. She quit smoking May, 2012, but vaped for a while. She has an artificial right eye. Bel Alas was previously seen for dizziness. She was orthostatic, so HCTZ was discontinued. Ms Anni Vaca is seen for pre op cardiac evaluation prior to permanent spinal stimulator implant. This is scheduled for 5/3/23. She denies any chest pain, palpitations, HUNT, dizziness, or edema. Bel Alas walks with a cane for balance assist. She states her back has never been the same since she had hip replacement surgery 3 years ago. Past Medical History:   has a past medical history of Arthritis, Blind left eye, CAD (coronary artery disease), Chronic back pain, Diabetes mellitus ([address] Hearing loss, Hyperlipidemia, Hypertension, and PONV (postoperative nausea and vomiting). Surgical History:   has a past surgical history that includes Coronary angioplasty with stent; Colonoscopy; epidural steroid injection (Bilateral, 2019); epidural steroid injection (N/A, 3/27/2019); lumbar nerve block (N/A, 2019); eye surgery (Right); and Total hip arthroplasty (Left, 2020). Social History:   reports that she quit smoking about 3 years ago. Her smoking use included e-cigarettes and cigarettes. She smoked an average of .25 packs per day. She has never used smokeless tobacco. She reports that she does not drink alcohol and does not use drugs.  for 55 years. Sister  from lung disease    Family History:  family history includes Diabetes in her sister.

## 2023-04-27 ENCOUNTER — OFFICE VISIT (OUTPATIENT)
Dept: CARDIOLOGY CLINIC | Age: 83
End: 2023-04-27
Payer: MEDICARE

## 2023-04-27 VITALS
WEIGHT: 186 LBS | DIASTOLIC BLOOD PRESSURE: 60 MMHG | BODY MASS INDEX: 30.02 KG/M2 | OXYGEN SATURATION: 98 % | HEART RATE: 66 BPM | SYSTOLIC BLOOD PRESSURE: 138 MMHG

## 2023-04-27 DIAGNOSIS — E78.49 OTHER HYPERLIPIDEMIA: ICD-10-CM

## 2023-04-27 DIAGNOSIS — I25.10 CORONARY ARTERY DISEASE INVOLVING NATIVE CORONARY ARTERY OF NATIVE HEART WITHOUT ANGINA PECTORIS: Primary | ICD-10-CM

## 2023-04-27 DIAGNOSIS — I10 PRIMARY HYPERTENSION: ICD-10-CM

## 2023-04-27 DIAGNOSIS — Z01.818 PRE-OP EXAM: ICD-10-CM

## 2023-04-27 PROCEDURE — 3078F DIAST BP <80 MM HG: CPT | Performed by: INTERNAL MEDICINE

## 2023-04-27 PROCEDURE — 1090F PRES/ABSN URINE INCON ASSESS: CPT | Performed by: INTERNAL MEDICINE

## 2023-04-27 PROCEDURE — 99214 OFFICE O/P EST MOD 30 MIN: CPT | Performed by: INTERNAL MEDICINE

## 2023-04-27 PROCEDURE — 1036F TOBACCO NON-USER: CPT | Performed by: INTERNAL MEDICINE

## 2023-04-27 PROCEDURE — 93000 ELECTROCARDIOGRAM COMPLETE: CPT | Performed by: INTERNAL MEDICINE

## 2023-04-27 PROCEDURE — 3075F SYST BP GE 130 - 139MM HG: CPT | Performed by: INTERNAL MEDICINE

## 2023-04-27 PROCEDURE — 1123F ACP DISCUSS/DSCN MKR DOCD: CPT | Performed by: INTERNAL MEDICINE

## 2023-04-27 PROCEDURE — G8417 CALC BMI ABV UP PARAM F/U: HCPCS | Performed by: INTERNAL MEDICINE

## 2023-04-27 PROCEDURE — G8427 DOCREV CUR MEDS BY ELIG CLIN: HCPCS | Performed by: INTERNAL MEDICINE

## 2023-04-27 PROCEDURE — G8400 PT W/DXA NO RESULTS DOC: HCPCS | Performed by: INTERNAL MEDICINE

## 2023-05-19 ENCOUNTER — HOSPITAL ENCOUNTER (OUTPATIENT)
Dept: CARDIOLOGY | Age: 83
Discharge: HOME | End: 2023-05-19
Payer: MEDICARE

## 2023-05-19 DIAGNOSIS — I65.23 BILATERAL CAROTID ARTERY STENOSIS: ICD-10-CM

## 2023-05-19 PROCEDURE — 93880 EXTRACRANIAL BILAT STUDY: CPT

## 2023-05-24 LAB
ALBUMIN SERPL-MCNC: 4.7 G/DL (ref 3.5–5.7)
ANION GAP SERPL CALCULATED.3IONS-SCNC: 11 MMOL/L (ref 4–16)
BUN BLDV-MCNC: 36 MG/DL (ref 8–26)
CALCIUM SERPL-MCNC: 9.6 MG/DL (ref 8.5–10.4)
CHLORIDE BLD-SCNC: 103 MEQ/L (ref 98–111)
CO2: 24 MMOL/L (ref 21–31)
CREAT SERPL-MCNC: 2.17 MG/DL (ref 0.6–1.2)
CREATININE URINE: 88.2 MG/DL
EGFR (CKD-EPI): 22 ML/MIN/1.73 M2
GLUCOSE BLD-MCNC: 116 MG/DL (ref 70–99)
PARATHYROID HORMONE INTACT: 66.7 PG/ML (ref 15–65)
PHOSPHORUS: 3.3 MG/DL (ref 2.5–4.5)
POTASSIUM SERPL-SCNC: 4.2 MEQ/L (ref 3.6–5.1)
PROTEIN, URINE, RANDOM: 18.9 MG/DL (ref 0–9.9)
PROTEIN/CREAT RATIO URINE RAN: 0.21 RATIO
SODIUM BLD-SCNC: 138 MEQ/L (ref 135–145)

## 2023-05-27 PROBLEM — Z01.818 PRE-OP EXAM: Status: RESOLVED | Noted: 2023-04-27 | Resolved: 2023-05-27

## 2023-09-27 NOTE — PROGRESS NOTES
[facility]   Cardiac Evalaution      Patient: Travis William  YOB: 1940  Date: 10/4/23      CC: CAD     Referring provider: Eric Busby DO    History of Present Illness:  Mrs. Jenise Brown has a history of coronary disease, hypertension, and hyperlipidemia; she also has diabetes and PVD with most recent testing showing moderate superficial femoral artery stenosis. Angiogram performed 8/19/10 showed widely patent stents in LAD and Diagonal 1. She quit smoking May, 2012, but vaped for a while. She has an artificial right eye. Willard Carlos was previously seen for dizziness. She was orthostatic, so HCTZ was discontinued. Ms Jenise Brown had a spinal stimulator placed since last visit. She denies chest pain, palpitations, dizziness. Willard Carlos is here with a family member. She states she is fatigued and has chronic back pain. Past Medical History:   has a past medical history of Arthritis, Blind left eye, CAD (coronary artery disease), Chronic back pain, Diabetes mellitus ([address]), Hearing loss, Hyperlipidemia, Hypertension, and PONV (postoperative nausea and vomiting). Surgical History:   has a past surgical history that includes Coronary angioplasty with stent; Colonoscopy; epidural steroid injection (Bilateral, 2019); epidural steroid injection (N/A, 3/27/2019); lumbar nerve block (N/A, 2019); eye surgery (Right); and Total hip arthroplasty (Left, 2020). Social History:   reports that she quit smoking about 3 years ago. Her smoking use included e-cigarettes and cigarettes. She smoked an average of .25 packs per day. She has never used smokeless tobacco. She reports that she does not drink alcohol and does not use drugs.  for 55 years. Sister  from lung disease    Family History:  family history includes Diabetes in her sister.      Allergies:  Cephalexin, Doxycycline, Levofloxacin, Levofloxacin, Other, Sulfa antibiotics, Ezetimibe, Metformin, Misoprostol,

## 2023-10-04 ENCOUNTER — OFFICE VISIT (OUTPATIENT)
Dept: CARDIOLOGY CLINIC | Age: 83
End: 2023-10-04
Payer: MEDICARE

## 2023-10-04 VITALS
OXYGEN SATURATION: 99 % | BODY MASS INDEX: 28.77 KG/M2 | DIASTOLIC BLOOD PRESSURE: 64 MMHG | WEIGHT: 179 LBS | HEIGHT: 66 IN | SYSTOLIC BLOOD PRESSURE: 130 MMHG | HEART RATE: 66 BPM

## 2023-10-04 DIAGNOSIS — E78.49 OTHER HYPERLIPIDEMIA: ICD-10-CM

## 2023-10-04 DIAGNOSIS — I25.10 CORONARY ARTERY DISEASE INVOLVING NATIVE CORONARY ARTERY OF NATIVE HEART WITHOUT ANGINA PECTORIS: Primary | ICD-10-CM

## 2023-10-04 DIAGNOSIS — I10 PRIMARY HYPERTENSION: ICD-10-CM

## 2023-10-04 PROCEDURE — 1123F ACP DISCUSS/DSCN MKR DOCD: CPT | Performed by: INTERNAL MEDICINE

## 2023-10-04 PROCEDURE — G8400 PT W/DXA NO RESULTS DOC: HCPCS | Performed by: INTERNAL MEDICINE

## 2023-10-04 PROCEDURE — 1090F PRES/ABSN URINE INCON ASSESS: CPT | Performed by: INTERNAL MEDICINE

## 2023-10-04 PROCEDURE — G8417 CALC BMI ABV UP PARAM F/U: HCPCS | Performed by: INTERNAL MEDICINE

## 2023-10-04 PROCEDURE — G8427 DOCREV CUR MEDS BY ELIG CLIN: HCPCS | Performed by: INTERNAL MEDICINE

## 2023-10-04 PROCEDURE — 1036F TOBACCO NON-USER: CPT | Performed by: INTERNAL MEDICINE

## 2023-10-04 PROCEDURE — 3075F SYST BP GE 130 - 139MM HG: CPT | Performed by: INTERNAL MEDICINE

## 2023-10-04 PROCEDURE — 3078F DIAST BP <80 MM HG: CPT | Performed by: INTERNAL MEDICINE

## 2023-10-04 PROCEDURE — G8484 FLU IMMUNIZE NO ADMIN: HCPCS | Performed by: INTERNAL MEDICINE

## 2023-10-04 PROCEDURE — 99214 OFFICE O/P EST MOD 30 MIN: CPT | Performed by: INTERNAL MEDICINE

## 2023-10-04 RX ORDER — FERROUS SULFATE 325(65) MG
325 TABLET ORAL
COMMUNITY

## 2023-10-09 LAB
ALBUMIN SERPL-MCNC: 4.3 G/DL (ref 3.5–5.7)
ALP BLD-CCNC: 60 IU/L (ref 35–135)
ALT SERPL-CCNC: 20 IU/L (ref 10–60)
AST SERPL-CCNC: 28 IU/L (ref 10–40)
BILIRUB SERPL-MCNC: 0.5 MG/DL (ref 0–1.2)
BILIRUBIN DIRECT: 0.1 MG/DL (ref 0–0.2)
CHOLESTEROL, TOTAL: 133 MG/DL
HDLC SERPL-MCNC: 49 MG/DL
LDL CHOLESTEROL CALCULATED: 49 MG/DL
NONHDLC SERPL-MCNC: 84 MG/DL
TOTAL PROTEIN: 7.3 G/DL (ref 6–8)
TRIGL SERPL-MCNC: 176 MG/DL

## 2023-12-28 ENCOUNTER — TELEPHONE (OUTPATIENT)
Dept: CARDIOLOGY CLINIC | Age: 83
End: 2023-12-28

## 2023-12-28 NOTE — TELEPHONE ENCOUNTER
She recently saw her PCP (and in contact with nephrology) for UTI symptoms. I can find no chart entry where Flomax was stopped other than on her med list history which reads it was stopped on 8/24/22. She stopped her HCTZ that same day per Dr. Dolores Miller for dizziness. Reyna cannot recall exactly when she stopped taking it but it has been \"quite awhile ago. \"      Incidentally, her urine culture was negative per nephrology entry 12/22/23 which she is aware of. I advised her to be sure to call them or her PCP if UTI symptoms redevelop.

## 2023-12-28 NOTE — TELEPHONE ENCOUNTER
Pt states her PCP Dr Liz Hurst is asking why [address] [place] [address] took her off her Flomax. Please call pt to advise.

## 2023-12-29 NOTE — TELEPHONE ENCOUNTER
I called Juan Jose Mehta and let her know Dr Lalito Mansfield did not discontinue Flomax. She will relay this to her pcp.

## 2024-01-20 ENCOUNTER — OFFICE VISIT (OUTPATIENT)
Age: 84
End: 2024-01-20

## 2024-01-20 VITALS
TEMPERATURE: 97.7 F | HEART RATE: 83 BPM | OXYGEN SATURATION: 98 % | DIASTOLIC BLOOD PRESSURE: 64 MMHG | BODY MASS INDEX: 28.41 KG/M2 | SYSTOLIC BLOOD PRESSURE: 101 MMHG | RESPIRATION RATE: 14 BRPM | WEIGHT: 176 LBS

## 2024-01-20 DIAGNOSIS — J20.9 ACUTE BRONCHITIS, UNSPECIFIED ORGANISM: Primary | ICD-10-CM

## 2024-01-20 RX ORDER — AZITHROMYCIN 250 MG/1
250 TABLET, FILM COATED ORAL SEE ADMIN INSTRUCTIONS
Qty: 6 TABLET | Refills: 0 | Status: SHIPPED | OUTPATIENT
Start: 2024-01-20 | End: 2024-01-25

## 2024-01-20 RX ORDER — BENZONATATE 100 MG/1
100 CAPSULE ORAL 3 TIMES DAILY PRN
Qty: 30 CAPSULE | Refills: 0 | Status: SHIPPED | OUTPATIENT
Start: 2024-01-20 | End: 2024-01-30

## 2024-01-20 ASSESSMENT — ENCOUNTER SYMPTOMS
ABDOMINAL PAIN: 0
SORE THROAT: 0
SINUS PRESSURE: 0
TROUBLE SWALLOWING: 0
VOICE CHANGE: 0
RHINORRHEA: 0
SHORTNESS OF BREATH: 0
EYE REDNESS: 0
HEMOPTYSIS: 0
HEARTBURN: 0
COUGH: 1
WHEEZING: 0

## 2024-01-20 NOTE — PROGRESS NOTES
Reyna Babcock (:  1940) is a 83 y.o. female,New patient, here for evaluation of the following chief complaint(s):  Cough (Thick mucus, bloody mucus x 1 week)      ASSESSMENT/PLAN:  1. Acute bronchitis, unspecified organism    - azithromycin (ZITHROMAX) 250 MG tablet; Take 1 tablet by mouth See Admin Instructions for 5 days 500mg on day 1 followed by 250mg on days 2 - 5  Dispense: 6 tablet; Refill: 0  - benzonatate (TESSALON) 100 MG capsule; Take 1 capsule by mouth 3 times daily as needed for Cough  Dispense: 30 capsule; Refill: 0       No follow-ups on file.    SUBJECTIVE/OBJECTIVE:    History provided by:  Patient  Cough  This is a new problem. The current episode started in the past 7 days. The problem has been gradually worsening. The cough is Productive of sputum. Associated symptoms include nasal congestion. Pertinent negatives include no chest pain, chills, ear pain, eye redness, fever, headaches, heartburn, hemoptysis, myalgias, rash, rhinorrhea, sore throat, shortness of breath, sweats or wheezing.       Vitals:    24 1301   BP: 101/64   Site: Right Upper Arm   Position: Sitting   Cuff Size: Medium Adult   Pulse: 83   Resp: 14   Temp: 97.7 °F (36.5 °C)   TempSrc: Oral   SpO2: 98%   Weight: 79.8 kg (176 lb)       Review of Systems   Constitutional:  Negative for activity change, appetite change, chills and fever.   HENT:  Positive for congestion. Negative for ear pain, rhinorrhea, sinus pressure, sore throat, trouble swallowing and voice change.    Eyes:  Negative for redness.   Respiratory:  Positive for cough. Negative for hemoptysis, shortness of breath and wheezing.    Cardiovascular:  Negative for chest pain.   Gastrointestinal:  Negative for abdominal pain and heartburn.   Musculoskeletal:  Negative for myalgias.   Skin:  Negative for rash.   Neurological:  Negative for dizziness and headaches.       Physical Exam  Constitutional:       General: She is not in acute distress.

## 2024-01-24 ENCOUNTER — TELEPHONE (OUTPATIENT)
Dept: CARDIOLOGY CLINIC | Age: 84
End: 2024-01-24

## 2024-01-24 DIAGNOSIS — R55 SYNCOPE, UNSPECIFIED SYNCOPE TYPE: Primary | ICD-10-CM

## 2024-01-24 NOTE — TELEPHONE ENCOUNTER
I spoke w/ Dr Martinez. Per Dr Martinez>30 day cardiac monitor and carotid doppler (cannot do CTA neck due to altered kidney function).     I called Reyna and discussed plan with her. She verbalized understanding. She is going to [facility] 1/25/24 at 10am for 30 day cardiac monitor and will have carotid doppler done on 1/26/24 at the [place] office.

## 2024-01-24 NOTE — TELEPHONE ENCOUNTER
Pt states she has been having falling spells over last 1 1/2 weeks. She has fallen 3 xs. Pt did not have BP readings denies being dizzy before falling. Pt has gone to PCP. Please call to advise

## 2024-01-24 NOTE — TELEPHONE ENCOUNTER
I spoke w/ Reyna. She states she fell on her back porch while letting her dog out. She did not have any warning with falling other than her head feels \"crazy\" and \"full\". When she fell on her back porch she did not pass out. Yesterday, she did \"black out\" in her pantry. She did not have any warning. She states she was unconscious for seconds. She felt bad when she woke up. She states she can hear her heart beating and it races at times. Pcp advised her to call Dr Martinez to have her carotids checked. Reyna does not monitor her blood pressure routinely and does not have any readings to provide.

## 2024-01-25 ENCOUNTER — NURSE ONLY (OUTPATIENT)
Dept: CARDIOLOGY CLINIC | Age: 84
End: 2024-01-25

## 2024-01-26 ENCOUNTER — HOSPITAL ENCOUNTER (OUTPATIENT)
Dept: CARDIOLOGY | Age: 84
Discharge: HOME | End: 2024-01-26
Payer: MEDICARE

## 2024-01-26 DIAGNOSIS — R55 SYNCOPE, UNSPECIFIED SYNCOPE TYPE: ICD-10-CM

## 2024-01-26 PROCEDURE — 93880 EXTRACRANIAL BILAT STUDY: CPT

## 2024-02-01 ENCOUNTER — TELEPHONE (OUTPATIENT)
Dept: CARDIOLOGY CLINIC | Age: 84
End: 2024-02-01

## 2024-02-01 NOTE — TELEPHONE ENCOUNTER
I spoke w/ Reyna and let her know that Dr Martinez wants to see her to review testing and symptoms with her. Appt made for 2/7/24 in [place]. She is agreeable to his appt date/time

## 2024-02-02 NOTE — PROGRESS NOTES
[facility]   Cardiac Evalaution      Patient: Reyna Babcock  YOB: 1940  Date: 2/7/24      CC: syncope      Referring provider: Bubba Willis DO    History of Present Illness:  Mrs. Babcock has a history of coronary disease, hypertension, and hyperlipidemia; she also has diabetes and PVD with most recent testing showing moderate superficial femoral artery stenosis. Angiogram performed 8/19/10 showed widely patent stents in LAD and Diagonal 1. She quit smoking May, 2012, but vaped for a while. She has an artificial right eye.      Reyna was previously seen for dizziness. She was orthostatic, so HCTZ was discontinued. Ms Babcock had a spinal stimulator placed.      Reyna called the office stating she has had been passing out. She states she \"feels funny in my head\". She was standing at her pantry, had no warning and passed out. She states her  yelled for her to take up.      She is here with her . She states she feels funny in her head and weak when she stands up. Her  says she had 3 episodes this morning. Reyna states she does not eat much. She has had a monitor on which shows all sinus rhythm. She has had carotid dopplers which show varying amount of LICA disease.     Past Medical History:   has a past medical history of Arthritis, Blind left eye, CAD (coronary artery disease), Chronic back pain, Diabetes mellitus (HCC), Hearing loss, Hyperlipidemia, Hypertension, and PONV (postoperative nausea and vomiting).    Surgical History:   has a past surgical history that includes Coronary angioplasty with stent; Colonoscopy; epidural steroid injection (Bilateral, 2/27/2019); epidural steroid injection (N/A, 3/27/2019); lumbar nerve block (N/A, 8/14/2019); eye surgery (Right); and Total hip arthroplasty (Left, 2/4/2020).     Social History:   reports that she quit smoking about 4 years ago. Her smoking use included e-cigarettes and cigarettes. She has never used

## 2024-02-07 ENCOUNTER — OFFICE VISIT (OUTPATIENT)
Dept: CARDIOLOGY CLINIC | Age: 84
End: 2024-02-07
Payer: MEDICARE

## 2024-02-07 VITALS
BODY MASS INDEX: 27.97 KG/M2 | HEIGHT: 66 IN | SYSTOLIC BLOOD PRESSURE: 158 MMHG | HEART RATE: 81 BPM | WEIGHT: 174 LBS | OXYGEN SATURATION: 99 % | DIASTOLIC BLOOD PRESSURE: 60 MMHG

## 2024-02-07 DIAGNOSIS — I25.10 CORONARY ARTERY DISEASE INVOLVING NATIVE CORONARY ARTERY OF NATIVE HEART WITHOUT ANGINA PECTORIS: ICD-10-CM

## 2024-02-07 DIAGNOSIS — R42 DIZZINESS: ICD-10-CM

## 2024-02-07 DIAGNOSIS — I10 PRIMARY HYPERTENSION: Primary | ICD-10-CM

## 2024-02-07 DIAGNOSIS — R55 SYNCOPE, UNSPECIFIED SYNCOPE TYPE: ICD-10-CM

## 2024-02-07 DIAGNOSIS — E78.49 OTHER HYPERLIPIDEMIA: ICD-10-CM

## 2024-02-07 PROCEDURE — G8484 FLU IMMUNIZE NO ADMIN: HCPCS | Performed by: INTERNAL MEDICINE

## 2024-02-07 PROCEDURE — G8427 DOCREV CUR MEDS BY ELIG CLIN: HCPCS | Performed by: INTERNAL MEDICINE

## 2024-02-07 PROCEDURE — 1123F ACP DISCUSS/DSCN MKR DOCD: CPT | Performed by: INTERNAL MEDICINE

## 2024-02-07 PROCEDURE — G8417 CALC BMI ABV UP PARAM F/U: HCPCS | Performed by: INTERNAL MEDICINE

## 2024-02-07 PROCEDURE — 3078F DIAST BP <80 MM HG: CPT | Performed by: INTERNAL MEDICINE

## 2024-02-07 PROCEDURE — 93000 ELECTROCARDIOGRAM COMPLETE: CPT | Performed by: INTERNAL MEDICINE

## 2024-02-07 PROCEDURE — 1090F PRES/ABSN URINE INCON ASSESS: CPT | Performed by: INTERNAL MEDICINE

## 2024-02-07 PROCEDURE — 1036F TOBACCO NON-USER: CPT | Performed by: INTERNAL MEDICINE

## 2024-02-07 PROCEDURE — G8400 PT W/DXA NO RESULTS DOC: HCPCS | Performed by: INTERNAL MEDICINE

## 2024-02-07 PROCEDURE — 99214 OFFICE O/P EST MOD 30 MIN: CPT | Performed by: INTERNAL MEDICINE

## 2024-02-07 PROCEDURE — 3077F SYST BP >= 140 MM HG: CPT | Performed by: INTERNAL MEDICINE

## 2024-02-07 RX ORDER — MIDODRINE HYDROCHLORIDE 5 MG/1
5 TABLET ORAL 3 TIMES DAILY
Qty: 270 TABLET | Refills: 3 | Status: SHIPPED | OUTPATIENT
Start: 2024-02-07

## 2024-02-15 ENCOUNTER — TELEPHONE (OUTPATIENT)
Dept: CARDIOLOGY CLINIC | Age: 84
End: 2024-02-15

## 2024-02-15 NOTE — TELEPHONE ENCOUNTER
Pt states after starting midodrine last week she doesn't know what bp should be but took bp this morning while up and bp was 114/53 - pulse 77, last night bp was 187/70 at rest. Pt asking what bp should be. Please call to advise.

## 2024-02-15 NOTE — TELEPHONE ENCOUNTER
Per Dr. Martinez if her standing B/P is over 120 for systolic she can cut the midodrine to bid. I spoke to pt and gave instructions.

## 2024-02-20 ENCOUNTER — TELEPHONE (OUTPATIENT)
Dept: CARDIOLOGY CLINIC | Age: 84
End: 2024-02-20

## 2024-02-20 NOTE — TELEPHONE ENCOUNTER
I spoke to Reyna. She reiterated she has been feeling well, no dizziness with positional changes. She said she was told to hold the Midodrine if top number was >120 so I told her to continue to go by that parameter. Her B/P has been over 120 since at least Sunday. I also advised her to check her B/P if she became dizzy or light-headed, and if top number <120, to take a midodrine.

## 2024-02-20 NOTE — TELEPHONE ENCOUNTER
Pt called with standing B/P readings. Today was 159/80 yesterday was 149/63 and Sunday she was 147/60. The last time she took a midodrine was Sunday. She is feeling much better and is staying hydrated. She wants to know if she should cont taking the midodrine.

## 2024-02-20 NOTE — TELEPHONE ENCOUNTER
Pt called to inform [facility] that when she take the new med midodrine 5mg her bp elevates high.  Please call to discuss this matter.  Thank you

## 2024-02-27 PROBLEM — E87.1 HYPONATREMIA: Status: ACTIVE | Noted: 2024-02-27

## 2024-02-28 NOTE — PROGRESS NOTES
[facility]   Cardiac Evalaution      Patient: Reyna Babcock  YOB: 1940  Date: 3/20/24      CC: syncope      Referring provider: Bubba Willis DO    History of Present Illness:  Mrs. Babcock has a history of coronary disease, hypertension, and hyperlipidemia; she also has diabetes and PVD with most recent testing showing moderate superficial femoral artery stenosis. Angiogram performed 8/19/10 showed widely patent stents in LAD and Diagonal 1. She quit smoking May, 2012, but vaped for a while. She has an artificial right eye.      Reyna was previously seen for dizziness. She was orthostatic, so HCTZ was discontinued. Ms Babcock had a spinal stimulator placed.      Reyna called the office stating she has had been passing out. She states she \"feels funny in my head\". She was standing at her pantry, had no warning and passed out. She states her  yelled for her to take up. She was started on Midodrine for orthostatic hypotension, but could not tolerate it. She also wore a monitor for 30 days that did not show any arrhythmia's.     Reyna is here with her  today. She states she feels better and has not had anymore passing out spells. She states Midodrine caused her to not be able to sleep. She is drinking plenty of water now.        Past Medical History:   has a past medical history of Arthritis, Blind left eye, CAD (coronary artery disease), Chronic back pain, Diabetes mellitus (HCC), Hearing loss, Hyperlipidemia, Hypertension, and PONV (postoperative nausea and vomiting).    Surgical History:   has a past surgical history that includes Coronary angioplasty with stent; Colonoscopy; epidural steroid injection (Bilateral, 2/27/2019); epidural steroid injection (N/A, 3/27/2019); lumbar nerve block (N/A, 8/14/2019); eye surgery (Right); and Total hip arthroplasty (Left, 2/4/2020).     Social History:   reports that she quit smoking about 4 years ago. Her smoking use

## 2024-03-13 ENCOUNTER — TELEPHONE (OUTPATIENT)
Dept: CARDIOLOGY CLINIC | Age: 84
End: 2024-03-13

## 2024-03-13 NOTE — TELEPHONE ENCOUNTER
Pt called to inform [facility] that the med midodrine 5mg is keeping her awake, Pt is unable to go to sleep. Per Pt she will not take any more.  Please call to discuss.  Thank you

## 2024-03-13 NOTE — TELEPHONE ENCOUNTER
I spoke with pt she is only taking one in the morning at this time.    153/81 standing 105/72    134/79 standing 110/82    139/79 Standing 124/59    Per Dr. Martinez she can stop the Midodrine at this time. I let Reyna know she can stop it and call us if she has any problems or develops dizziness again.

## 2024-03-20 ENCOUNTER — OFFICE VISIT (OUTPATIENT)
Dept: CARDIOLOGY CLINIC | Age: 84
End: 2024-03-20
Payer: MEDICARE

## 2024-03-20 VITALS
SYSTOLIC BLOOD PRESSURE: 144 MMHG | BODY MASS INDEX: 28.77 KG/M2 | OXYGEN SATURATION: 99 % | HEIGHT: 66 IN | WEIGHT: 179 LBS | HEART RATE: 66 BPM | DIASTOLIC BLOOD PRESSURE: 60 MMHG

## 2024-03-20 DIAGNOSIS — I25.10 CORONARY ARTERY DISEASE INVOLVING NATIVE CORONARY ARTERY OF NATIVE HEART WITHOUT ANGINA PECTORIS: ICD-10-CM

## 2024-03-20 DIAGNOSIS — R55 SYNCOPE, UNSPECIFIED SYNCOPE TYPE: ICD-10-CM

## 2024-03-20 DIAGNOSIS — I10 PRIMARY HYPERTENSION: Primary | ICD-10-CM

## 2024-03-20 DIAGNOSIS — I65.23 BILATERAL CAROTID ARTERY STENOSIS: ICD-10-CM

## 2024-03-20 DIAGNOSIS — E78.49 OTHER HYPERLIPIDEMIA: ICD-10-CM

## 2024-03-20 DIAGNOSIS — R42 DIZZINESS: ICD-10-CM

## 2024-03-20 PROCEDURE — G8484 FLU IMMUNIZE NO ADMIN: HCPCS | Performed by: INTERNAL MEDICINE

## 2024-03-20 PROCEDURE — 1090F PRES/ABSN URINE INCON ASSESS: CPT | Performed by: INTERNAL MEDICINE

## 2024-03-20 PROCEDURE — G8417 CALC BMI ABV UP PARAM F/U: HCPCS | Performed by: INTERNAL MEDICINE

## 2024-03-20 PROCEDURE — 99214 OFFICE O/P EST MOD 30 MIN: CPT | Performed by: INTERNAL MEDICINE

## 2024-03-20 PROCEDURE — 3077F SYST BP >= 140 MM HG: CPT | Performed by: INTERNAL MEDICINE

## 2024-03-20 PROCEDURE — G8400 PT W/DXA NO RESULTS DOC: HCPCS | Performed by: INTERNAL MEDICINE

## 2024-03-20 PROCEDURE — 1123F ACP DISCUSS/DSCN MKR DOCD: CPT | Performed by: INTERNAL MEDICINE

## 2024-03-20 PROCEDURE — G8427 DOCREV CUR MEDS BY ELIG CLIN: HCPCS | Performed by: INTERNAL MEDICINE

## 2024-03-20 PROCEDURE — 1036F TOBACCO NON-USER: CPT | Performed by: INTERNAL MEDICINE

## 2024-03-20 PROCEDURE — 3078F DIAST BP <80 MM HG: CPT | Performed by: INTERNAL MEDICINE

## 2024-03-20 RX ORDER — BACITRACIN 500 [USP'U]/G
OINTMENT OPHTHALMIC
COMMUNITY
Start: 2024-02-28

## 2024-09-26 ENCOUNTER — OFFICE VISIT (OUTPATIENT)
Dept: CARDIOLOGY CLINIC | Age: 84
End: 2024-09-26
Payer: MEDICARE

## 2024-09-26 VITALS
BODY MASS INDEX: 28.28 KG/M2 | OXYGEN SATURATION: 98 % | SYSTOLIC BLOOD PRESSURE: 136 MMHG | DIASTOLIC BLOOD PRESSURE: 66 MMHG | WEIGHT: 176 LBS | HEART RATE: 71 BPM | HEIGHT: 66 IN

## 2024-09-26 DIAGNOSIS — I25.10 CORONARY ARTERY DISEASE INVOLVING NATIVE CORONARY ARTERY OF NATIVE HEART WITHOUT ANGINA PECTORIS: Primary | ICD-10-CM

## 2024-09-26 DIAGNOSIS — I10 PRIMARY HYPERTENSION: ICD-10-CM

## 2024-09-26 DIAGNOSIS — E78.49 OTHER HYPERLIPIDEMIA: ICD-10-CM

## 2024-09-26 PROCEDURE — 3075F SYST BP GE 130 - 139MM HG: CPT | Performed by: INTERNAL MEDICINE

## 2024-09-26 PROCEDURE — 1090F PRES/ABSN URINE INCON ASSESS: CPT | Performed by: INTERNAL MEDICINE

## 2024-09-26 PROCEDURE — 1036F TOBACCO NON-USER: CPT | Performed by: INTERNAL MEDICINE

## 2024-09-26 PROCEDURE — G8417 CALC BMI ABV UP PARAM F/U: HCPCS | Performed by: INTERNAL MEDICINE

## 2024-09-26 PROCEDURE — 3078F DIAST BP <80 MM HG: CPT | Performed by: INTERNAL MEDICINE

## 2024-09-26 PROCEDURE — 1123F ACP DISCUSS/DSCN MKR DOCD: CPT | Performed by: INTERNAL MEDICINE

## 2024-09-26 PROCEDURE — G8427 DOCREV CUR MEDS BY ELIG CLIN: HCPCS | Performed by: INTERNAL MEDICINE

## 2024-09-26 PROCEDURE — 99214 OFFICE O/P EST MOD 30 MIN: CPT | Performed by: INTERNAL MEDICINE

## 2024-09-26 PROCEDURE — G8400 PT W/DXA NO RESULTS DOC: HCPCS | Performed by: INTERNAL MEDICINE

## 2024-11-06 LAB
ALBUMIN: 4.4 G/DL (ref 3.5–5.7)
ANION GAP SERPL CALCULATED.3IONS-SCNC: 8 MMOL/L (ref 4–16)
BILIRUBIN, URINE: NEGATIVE
BUN BLDV-MCNC: 63 MG/DL (ref 8–26)
CALCIUM SERPL-MCNC: 9.3 MG/DL (ref 8.5–10.4)
CHLORIDE BLD-SCNC: 100 MMOL/L (ref 98–111)
CLARITY, UA: CLEAR
CO2: 25 MMOL/L (ref 21–31)
COLOR, UA: YELLOW
CREAT SERPL-MCNC: 2.02 MG/DL (ref 0.6–1.2)
CREATININE URINE: 46.4 MG/DL
EGFR (CKD-EPI): 24 ML/MIN/1.73 M2
EPITHELIAL CELLS, UA: <6 /HPF
ERYTHROCYTES URINE: <6 /HPF
GLUCOSE BLD-MCNC: 160 MG/DL (ref 70–99)
GLUCOSE URINE: NEGATIVE
HB: SOURCE: ABNORMAL
KETONES, URINE: NEGATIVE
LEUKOCYTE ESTERASE, URINE: ABNORMAL
LEUKOCYTES, UA: ABNORMAL /HPF
NITRITE, URINE: NEGATIVE
PH, URINE: 6.5 (ref 5–8)
PHOSPHORUS: 4.5 MG/DL (ref 2.5–4.5)
POTASSIUM SERPL-SCNC: 4.6 MMOL/L (ref 3.6–5.1)
PROTEIN, URINE, RANDOM: 14.1 MG/DL (ref 0–9.9)
PROTEIN, URINE: NEGATIVE
PROTEIN/CREAT RATIO URINE RAN: 0.3 RATIO
PTH INTACT: 88.5 PG/ML (ref 15–65)
RBC URINE: NEGATIVE
SODIUM BLD-SCNC: 133 MMOL/L (ref 135–145)
SPECIFIC GRAVITY UA: 1.01 (ref 1–1.03)
URINE TYPE: ABNORMAL
UROBILINOGEN, URINE: NORMAL MG/DL

## 2025-03-31 NOTE — PROGRESS NOTES
[facility]   Cardiac Evalaution      Patient: Reyna Babcock  YOB: 1940  Date: 4/14/25      CC: syncope      Referring provider: Bubba Willis DO    History of Present Illness:  Mrs. Babcock has a history of coronary disease, hypertension, and hyperlipidemia; she also has diabetes and PVD with most recent testing showing moderate superficial femoral artery stenosis. Angiogram performed 8/19/10 showed widely patent stents in LAD and Diagonal 1. She quit smoking May, 2012, but vaped for a while. She has an artificial right eye.      Reyna was previously seen for dizziness. She was orthostatic, so HCTZ was discontinued. Ms Babcock had a spinal stimulator placed.      Reyna called the office stating she has had been passing out. She states she \"feels funny in my head\". She was standing at her pantry, had no warning and passed out. She states her  yelled for her to take up. She was started on Midodrine for orthostatic hypotension, but could not tolerate it. She also wore a monitor for 30 days that did not show any arrhythmias.     Today, Reyna is here with her . She states her hip and back hurt. She is following with Dr Mckenzie for this. Reyna also has neuropathy. She denies chest pain, palpitations, HUNT, or edema. She has ongoing dizziness, but it is improved from last visit.      Past Medical History:   has a past medical history of Arthritis, Blind left eye, CAD (coronary artery disease), Chronic back pain, Diabetes mellitus (HCC), Hearing loss, Hyperlipidemia, Hypertension, and PONV (postoperative nausea and vomiting).    Surgical History:   has a past surgical history that includes Coronary angioplasty with stent; Colonoscopy; epidural steroid injection (Bilateral, 2/27/2019); epidural steroid injection (N/A, 3/27/2019); lumbar nerve block (N/A, 8/14/2019); eye surgery (Right); and Total hip arthroplasty (Left, 2/4/2020).     Social History:   reports that she quit

## 2025-04-14 ENCOUNTER — OFFICE VISIT (OUTPATIENT)
Dept: CARDIOLOGY CLINIC | Age: 85
End: 2025-04-14
Payer: MEDICARE

## 2025-04-14 VITALS
BODY MASS INDEX: 28.61 KG/M2 | HEIGHT: 66 IN | OXYGEN SATURATION: 99 % | WEIGHT: 178 LBS | HEART RATE: 66 BPM | SYSTOLIC BLOOD PRESSURE: 140 MMHG | DIASTOLIC BLOOD PRESSURE: 70 MMHG

## 2025-04-14 DIAGNOSIS — I25.10 CORONARY ARTERY DISEASE INVOLVING NATIVE CORONARY ARTERY OF NATIVE HEART WITHOUT ANGINA PECTORIS: Primary | ICD-10-CM

## 2025-04-14 DIAGNOSIS — E78.49 OTHER HYPERLIPIDEMIA: ICD-10-CM

## 2025-04-14 DIAGNOSIS — I10 PRIMARY HYPERTENSION: ICD-10-CM

## 2025-04-14 PROCEDURE — 99214 OFFICE O/P EST MOD 30 MIN: CPT | Performed by: INTERNAL MEDICINE

## 2025-04-14 PROCEDURE — G8427 DOCREV CUR MEDS BY ELIG CLIN: HCPCS | Performed by: INTERNAL MEDICINE

## 2025-04-14 PROCEDURE — G8400 PT W/DXA NO RESULTS DOC: HCPCS | Performed by: INTERNAL MEDICINE

## 2025-04-14 PROCEDURE — G8417 CALC BMI ABV UP PARAM F/U: HCPCS | Performed by: INTERNAL MEDICINE

## 2025-04-14 PROCEDURE — 3078F DIAST BP <80 MM HG: CPT | Performed by: INTERNAL MEDICINE

## 2025-04-14 PROCEDURE — 3077F SYST BP >= 140 MM HG: CPT | Performed by: INTERNAL MEDICINE

## 2025-04-14 PROCEDURE — 1036F TOBACCO NON-USER: CPT | Performed by: INTERNAL MEDICINE

## 2025-04-14 PROCEDURE — 1090F PRES/ABSN URINE INCON ASSESS: CPT | Performed by: INTERNAL MEDICINE

## 2025-04-14 PROCEDURE — 1159F MED LIST DOCD IN RCRD: CPT | Performed by: INTERNAL MEDICINE

## 2025-04-14 PROCEDURE — 1123F ACP DISCUSS/DSCN MKR DOCD: CPT | Performed by: INTERNAL MEDICINE

## 2025-04-14 PROCEDURE — G2211 COMPLEX E/M VISIT ADD ON: HCPCS | Performed by: INTERNAL MEDICINE

## 2025-04-14 RX ORDER — ATORVASTATIN CALCIUM 40 MG/1
40 TABLET, FILM COATED ORAL NIGHTLY
Qty: 90 TABLET | Refills: 3
Start: 2025-04-14

## (undated) DEVICE — DISPOSABLE OR TOWEL: Brand: CARDINAL HEALTH

## (undated) DEVICE — R3 3 HOLE ACETABULAR SHELL 52MM
Type: IMPLANTABLE DEVICE | Site: HIP | Status: NON-FUNCTIONAL
Brand: R3 ACETABULAR
Removed: 2020-02-04

## (undated) DEVICE — DUAL CUT SAGITTAL BLADE

## (undated) DEVICE — GARMENT,MEDLINE,DVT,INT,CALF,MED, GEN2: Brand: MEDLINE

## (undated) DEVICE — SUTURE MCRYL SZ 4-0 L27IN ABSRB UD L19MM PS-2 1/2 CIR PRIM Y426H

## (undated) DEVICE — Z DISCONTINUED USE 2744636  DRESSING AQUACEL 14 IN ALG W3.5XL14IN POLYUR FLM CVR W/ HYDRCOLL

## (undated) DEVICE — SYRINGE MED 3ML CLR PLAS STD N CTRL LUERLOCK TIP DISP

## (undated) DEVICE — STERILE POLYISOPRENE POWDER-FREE SURGICAL GLOVES: Brand: PROTEXIS

## (undated) DEVICE — NEEDLE SUT SZ 3 MAYO 1 2 CIR TAPR PNT DISPOSABLE

## (undated) DEVICE — PROTECTOR ULN NRV PUR FOAM HK LOOP STRP ANATOMICALLY

## (undated) DEVICE — HIGH FLOW TIP

## (undated) DEVICE — NEEDLE HYPO 22GA L1 1/2IN PIVOTING SHLD FOR LUERLOCK SYR

## (undated) DEVICE — PEN: MARKING STD 100/CS: Brand: MEDICAL ACTION INDUSTRIES

## (undated) DEVICE — PORT VLV 2 W NDL FREE SMRTSITE

## (undated) DEVICE — CHLORAPREP 26ML ORANGE

## (undated) DEVICE — COVER LT HNDL BLU PLAS

## (undated) DEVICE — GOWN,SIRUS,POLYRNF,BRTHSLV,XLN/XXL,18/CS: Brand: MEDLINE

## (undated) DEVICE — YANKAUER,OPEN TIP,W/O VENT,STERILE: Brand: MEDLINE INDUSTRIES, INC.

## (undated) DEVICE — PACK PROCEDURE SURG TOT HIP

## (undated) DEVICE — SOLUTION IV IRRIG WATER 1000ML POUR BRL 2F7114

## (undated) DEVICE — Device: Brand: JELCO

## (undated) DEVICE — 1010 S-DRAPE TOWEL DRAPE 10/BX: Brand: STERI-DRAPE™

## (undated) DEVICE — R3 3 HOLE ACETABULAR SHELL 54MM
Type: IMPLANTABLE DEVICE | Site: HIP | Status: NON-FUNCTIONAL
Brand: R3 ACETABULAR
Removed: 2020-02-04

## (undated) DEVICE — TIP IRR FEM CNL DISP FOR BTTRY PWD INTERPULSE

## (undated) DEVICE — REFLECTION FLEXIBLE DRILL 25MM: Brand: REFLECTION

## (undated) DEVICE — 450 ML BOTTLE OF 0.05% CHLORHEXIDINE GLUCONATE IN 99.95% STERILE WATER FOR IRRIGATION, USP AND APPLICATOR.: Brand: IRRISEPT ANTIMICROBIAL WOUND LAVAGE

## (undated) DEVICE — COUNTER NDL 40 COUNT HLD 70 NUM FOAM BLK SGL MAG W BLDE REMV

## (undated) DEVICE — NEEDLE SPNL 22GA L3.5IN BLK HUB S STL REG WALL FIT STYL W/

## (undated) DEVICE — PEEL-AWAY HOOD: Brand: FLYTE, SURGICOOL

## (undated) DEVICE — SOLUTION IRRIG 3000ML LAC R FLX CONT

## (undated) DEVICE — REFLECTION FLEXIBLE DRILL 35MM: Brand: REFLECTION

## (undated) DEVICE — ORTHO PRE OP PACK: Brand: MEDLINE INDUSTRIES, INC.

## (undated) DEVICE — STRIP,CLOSURE,WOUND,MEDI-STRIP,1/2X4: Brand: MEDLINE

## (undated) DEVICE — JEWISH HOSPITAL TURNOVER KIT: Brand: MEDLINE INDUSTRIES, INC.

## (undated) DEVICE — GLOVE SURG SZ 8 CRM LTX FREE POLYISOPRENE POLYMER BEAD ANTI

## (undated) DEVICE — UNIVERSAL BLOCK TRAY: Brand: AVANOS*

## (undated) DEVICE — R3 SCREW HOLE COVER
Type: IMPLANTABLE DEVICE | Site: HIP | Status: NON-FUNCTIONAL
Brand: R3

## (undated) DEVICE — BLANKET WRM W29.9XL79.1IN UP BODY FORC AIR MISTRAL-AIR

## (undated) DEVICE — DECANTER BAG 9": Brand: MEDLINE INDUSTRIES, INC.

## (undated) DEVICE — MEDIA CONTRAST RX ISOVUE-300 61% 30ML VIALS

## (undated) DEVICE — SUTURE VCRL SZ 2-0 L18IN ABSRB UD CT-1 L36MM 1/2 CIR J839D

## (undated) DEVICE — STANDARD HYPODERMIC NEEDLE,POLYPROPYLENE HUB: Brand: MONOJECT

## (undated) DEVICE — TUBING, SUCTION, 1/4" X 12', STRAIGHT: Brand: MEDLINE

## (undated) DEVICE — REFLECTION TAPERED HOLE COVER INSERTER: Brand: REFLECTION

## (undated) DEVICE — SURE SET-DOUBLE BASIN-LF: Brand: MEDLINE INDUSTRIES, INC.

## (undated) DEVICE — HANDPIECE SUCTION TUBING INTERPULSE 10FT

## (undated) DEVICE — SUTURE VCRL SZ 0 L18IN ABSRB UD L36MM CT-1 1/2 CIR J840D

## (undated) DEVICE — NEEDLE EPI L3.5IN OD20GA CLR POLYCARB HUB WNG N DEHP TUOHY

## (undated) DEVICE — PLATE ES AD W 9FT CRD 2

## (undated) DEVICE — SET EXTN L7IN PRIMING VOL 0.5ML PRSS RATE STD BOR 1 REM

## (undated) DEVICE — SUTURE ETHBND EXCEL SZ 2 L30IN NONABSORBABLE GRN L40MM V-37 MX69G

## (undated) DEVICE — E-Z CLEAN, NON-STICK, PTFE COATED, ELECTROSURGICAL BLADE ELECTRODE, 2.5 INCH (6.35 CM): Brand: EZ CLEAN